# Patient Record
Sex: MALE | URBAN - METROPOLITAN AREA
[De-identification: names, ages, dates, MRNs, and addresses within clinical notes are randomized per-mention and may not be internally consistent; named-entity substitution may affect disease eponyms.]

---

## 2022-06-04 ENCOUNTER — TELEPHONE ENCOUNTER (OUTPATIENT)
Dept: URBAN - METROPOLITAN AREA CLINIC 68 | Facility: CLINIC | Age: 87
End: 2022-06-04

## 2022-06-05 ENCOUNTER — TELEPHONE ENCOUNTER (OUTPATIENT)
Dept: URBAN - METROPOLITAN AREA CLINIC 68 | Facility: CLINIC | Age: 87
End: 2022-06-05

## 2022-06-25 ENCOUNTER — TELEPHONE ENCOUNTER (OUTPATIENT)
Age: 87
End: 2022-06-25

## 2022-06-26 ENCOUNTER — TELEPHONE ENCOUNTER (OUTPATIENT)
Age: 87
End: 2022-06-26

## 2023-01-24 ENCOUNTER — NEW PATIENT (OUTPATIENT)
Dept: URBAN - METROPOLITAN AREA CLINIC 33 | Facility: CLINIC | Age: 88
End: 2023-01-24

## 2023-01-24 VITALS
HEIGHT: 68 IN | SYSTOLIC BLOOD PRESSURE: 134 MMHG | HEART RATE: 71 BPM | BODY MASS INDEX: 21.22 KG/M2 | DIASTOLIC BLOOD PRESSURE: 84 MMHG | WEIGHT: 140 LBS

## 2023-01-24 DIAGNOSIS — H43.393: ICD-10-CM

## 2023-01-24 DIAGNOSIS — H04.123: ICD-10-CM

## 2023-01-24 DIAGNOSIS — H35.3122: ICD-10-CM

## 2023-01-24 DIAGNOSIS — H35.3211: ICD-10-CM

## 2023-01-24 DIAGNOSIS — D31.31: ICD-10-CM

## 2023-01-24 PROCEDURE — 92134 CPTRZ OPH DX IMG PST SGM RTA: CPT

## 2023-01-24 PROCEDURE — 92004 COMPRE OPH EXAM NEW PT 1/>: CPT

## 2023-01-24 ASSESSMENT — TONOMETRY
OS_IOP_MMHG: 16
OD_IOP_MMHG: 16

## 2023-01-24 ASSESSMENT — VISUAL ACUITY
OS_CC: 20/20-2
OD_CC: 20/30-1

## 2023-01-31 ENCOUNTER — CLINIC PROCEDURE ONLY (OUTPATIENT)
Dept: URBAN - METROPOLITAN AREA CLINIC 33 | Facility: CLINIC | Age: 88
End: 2023-01-31

## 2023-01-31 DIAGNOSIS — H35.3122: ICD-10-CM

## 2023-01-31 DIAGNOSIS — H35.3211: ICD-10-CM

## 2023-01-31 PROCEDURE — 92250 FUNDUS PHOTOGRAPHY W/I&R: CPT

## 2023-01-31 PROCEDURE — 67028 INJECTION EYE DRUG: CPT

## 2023-10-02 ENCOUNTER — HOSPITAL ENCOUNTER (OUTPATIENT)
Dept: RADIOLOGY | Facility: HOSPITAL | Age: 88
Discharge: HOME | End: 2023-10-02
Payer: MEDICARE

## 2023-10-02 DIAGNOSIS — I35.0 NONRHEUMATIC AORTIC (VALVE) STENOSIS: ICD-10-CM

## 2023-10-02 PROCEDURE — 74174 CTA ABD&PLVS W/CONTRAST: CPT | Performed by: STUDENT IN AN ORGANIZED HEALTH CARE EDUCATION/TRAINING PROGRAM

## 2023-10-02 PROCEDURE — 74174 CTA ABD&PLVS W/CONTRAST: CPT

## 2023-10-02 PROCEDURE — 2550000001 HC RX 255 CONTRASTS: Performed by: INTERNAL MEDICINE

## 2023-10-02 PROCEDURE — 71275 CT ANGIOGRAPHY CHEST: CPT | Performed by: STUDENT IN AN ORGANIZED HEALTH CARE EDUCATION/TRAINING PROGRAM

## 2023-10-02 RX ADMIN — IOHEXOL 80 ML: 350 INJECTION, SOLUTION INTRAVENOUS at 13:52

## 2023-10-09 ENCOUNTER — CARDIOLOGY CONFERENCE (OUTPATIENT)
Dept: CARDIOLOGY | Facility: HOSPITAL | Age: 88
End: 2023-10-09
Payer: MEDICARE

## 2023-10-09 NOTE — PROGRESS NOTES
A 514, P 84.7  SOV 41/40/40 STJ 36  34 Evolut fx , no predil  RCA 21 , LCA 16.5   22 Fr sheath, Left Femoral Primary

## 2023-10-20 DIAGNOSIS — I35.0 NONRHEUMATIC AORTIC VALVE STENOSIS: ICD-10-CM

## 2023-10-20 NOTE — TELEPHONE ENCOUNTER
Pt called regarding shellfish allergy. Per pt, He was not premedicated for previous testing aa outside facilty. Perpt no issues with contrast dye.

## 2023-10-24 DIAGNOSIS — I35.0 NONRHEUMATIC AORTIC VALVE STENOSIS: Primary | ICD-10-CM

## 2023-10-27 ENCOUNTER — PREP FOR PROCEDURE (OUTPATIENT)
Dept: CARDIOLOGY | Facility: HOSPITAL | Age: 88
End: 2023-10-27
Payer: MEDICARE

## 2023-10-27 DIAGNOSIS — Z95.2 S/P TAVR (TRANSCATHETER AORTIC VALVE REPLACEMENT): ICD-10-CM

## 2023-10-27 DIAGNOSIS — I35.0 SEVERE AORTIC STENOSIS: Primary | ICD-10-CM

## 2023-10-27 RX ORDER — DOCUSATE SODIUM 100 MG/1
100 CAPSULE, LIQUID FILLED ORAL 2 TIMES DAILY
OUTPATIENT
Start: 2023-10-27

## 2023-10-27 RX ORDER — TRAMADOL HYDROCHLORIDE 50 MG/1
50 TABLET ORAL EVERY 6 HOURS PRN
OUTPATIENT
Start: 2023-10-27

## 2023-10-27 RX ORDER — PANTOPRAZOLE SODIUM 40 MG/1
40 TABLET, DELAYED RELEASE ORAL
OUTPATIENT
Start: 2023-10-28

## 2023-10-27 RX ORDER — PROCHLORPERAZINE MALEATE 10 MG
10 TABLET ORAL EVERY 6 HOURS PRN
OUTPATIENT
Start: 2023-10-27

## 2023-10-27 RX ORDER — ACETAMINOPHEN 325 MG/1
650 TABLET ORAL EVERY 6 HOURS PRN
OUTPATIENT
Start: 2023-10-27

## 2023-10-27 RX ORDER — CEFAZOLIN SODIUM 2 G/50ML
2 SOLUTION INTRAVENOUS ONCE
Status: CANCELLED | OUTPATIENT
Start: 2023-10-27 | End: 2023-10-27

## 2023-10-27 RX ORDER — SODIUM CHLORIDE, SODIUM LACTATE, POTASSIUM CHLORIDE, CALCIUM CHLORIDE 600; 310; 30; 20 MG/100ML; MG/100ML; MG/100ML; MG/100ML
75 INJECTION, SOLUTION INTRAVENOUS CONTINUOUS
Status: CANCELLED | OUTPATIENT
Start: 2023-10-27 | End: 2023-10-27

## 2023-10-27 RX ORDER — PANTOPRAZOLE SODIUM 40 MG/10ML
40 INJECTION, POWDER, LYOPHILIZED, FOR SOLUTION INTRAVENOUS
OUTPATIENT
Start: 2023-10-28

## 2023-10-27 RX ORDER — PROCHLORPERAZINE 25 MG/1
25 SUPPOSITORY RECTAL EVERY 12 HOURS PRN
OUTPATIENT
Start: 2023-10-27

## 2023-10-27 RX ORDER — ACETAMINOPHEN 650 MG/1
650 SUPPOSITORY RECTAL EVERY 6 HOURS PRN
OUTPATIENT
Start: 2023-10-27

## 2023-10-27 RX ORDER — ACETAMINOPHEN 160 MG/5ML
650 SOLUTION ORAL EVERY 6 HOURS PRN
OUTPATIENT
Start: 2023-10-27

## 2023-10-27 RX ORDER — PROCHLORPERAZINE EDISYLATE 5 MG/ML
10 INJECTION INTRAMUSCULAR; INTRAVENOUS EVERY 6 HOURS PRN
OUTPATIENT
Start: 2023-10-27

## 2023-10-27 NOTE — DISCHARGE INSTRUCTIONS
####  POST VALVE PROCEDURE DISCHARGE INSTRUCTIONS   ####    - Please weigh yourself daily (first thing in the morning) and record reading  - Please take and record your blood pressure 2 times a day (at least 60-90 mins AFTER you take your meds)  PLEASE HAVE THESE READINGS AVAILABLE DURING YOUR FOLLOW-UP APPOINTMENTS!!    - NO DRIVING OR LIFTING/PULLING/PUSHING GREATER THAN 10 POUNDS FOR 1 WEEK FROM YOUR PROCEDURE DATE.    - You have been given the order requisition for the 1 month ECHO at the time of your discharge.  Please call and schedule this ECHO at your LOCAL center (no sooner than 23 days after your procedure date).    - You will have 2 appointments that are vital to your post procedure care, these will be scheduled for you IF YOU NEED TO CHANGE YOUR APPOINTMENT TIME/DATE, PLEASE CALL [phone]     - Please follow up with your PCP within 7-14 days of discharge  - You will follow up with your primary cardiologist in 6-10 weeks    **** No elective dental procedures or cleanings for 3 months post procedure - You will need dental prophylaxis (oral antibiotic) prior to dental work/cleanings for life *****      ****   CALL PROVIDER IF:   ****  - Breathing faster than normal.   - Breathing harder than normal or having retractions.   - Fever of 100.4 F (38 C) or higher.   - Chills.   - Drinking less than normal.   - Urinating less than normal, over 1 day.   - Acting very sleepy and difficult to awaken.   - Vomiting (throwing up) and not able to eat or drink for 12 hours.   - 3 or more loose, watery bowel movements in 24 hours (diarrhea).    -Any new concerning symptoms.    - If you develop difficulty breathing, rash, hives, severe nausea, vomiting, light-headedness or any signs of infection, immediately contact your doctor and go to the nearest emergency room.      ACTIVITY:  - You are advised to go directly home from the hospital.   - DO NOT lift anything heavier than 10 pounds for one week from procedure, this  allows for proper healing of the groin.   - No excessive exercise or treadmill use for one week. You may walk and do stairs, slowly.   - No sexual activities for 24 hours after you arrive home.      WOUND CARE:  - If slight bleeding should occur at site, lie down and have someone apply firm pressure just above the puncture site for 5 minutes.  If it continues or is profuse, call 911. Always notify your doctor if bleeding occurs.   - Keep site clean and dry. Let air dry or you may use a simple bandaid.   - Gently cleanse the puncture site in your groin with soap and water only.   - You may experience some tenderness, bruising or minimal inflammation.  If you have any concerns, you may contact the Cath Lab or if any of these symptoms become excessive, contact your cardiologist or go to the emergency room.   - No tub baths, soaking, or swimming for one week from procedure.   - May shower the next day after your procedure.    DIET:  - You may resume your normal diet. However, be mindful of your sodium intake.  Ideally you should try to limit your daily intake of sodium to 2-3g a day      HEART FAILURE SPECIFIC INSTRUCTIONS:   - CALL 911 IF YOU HAVE ANY OF THE SIGNS AND SYMPTOMS OF HEART FAILURE:   1. Chest pain   2. Significant Shortness of breath   3. Fainting.   - Notify your physician immediately if you have shortness of breath; weight gain of 3 lbs. or more; fatigue and loss of energy; swelling of lower extremities or abdomen; dizziness or fainting; change of appetite; and frequent coughing.   - Daily weight on the same scale, same time after voiding and before eating.   - Maintain daily weight log.

## 2023-10-30 ENCOUNTER — APPOINTMENT (OUTPATIENT)
Dept: CARDIOLOGY | Facility: HOSPITAL | Age: 88
DRG: 266 | End: 2023-10-30
Payer: MEDICARE

## 2023-10-30 ENCOUNTER — HOSPITAL ENCOUNTER (INPATIENT)
Facility: HOSPITAL | Age: 88
LOS: 10 days | Discharge: NURSING, REHAB OR LONG-TERM CARE | DRG: 266 | End: 2023-11-09
Attending: INTERNAL MEDICINE | Admitting: INTERNAL MEDICINE
Payer: MEDICARE

## 2023-10-30 DIAGNOSIS — I35.9 NONRHEUMATIC AORTIC VALVE DISORDER, UNSPECIFIED: ICD-10-CM

## 2023-10-30 DIAGNOSIS — I10 PRIMARY HYPERTENSION: Chronic | ICD-10-CM

## 2023-10-30 DIAGNOSIS — R33.8 ACUTE URINARY RETENTION: ICD-10-CM

## 2023-10-30 DIAGNOSIS — I48.20 CHRONIC ATRIAL FIBRILLATION (MULTI): Chronic | ICD-10-CM

## 2023-10-30 DIAGNOSIS — I35.0 NONRHEUMATIC AORTIC VALVE STENOSIS: ICD-10-CM

## 2023-10-30 DIAGNOSIS — Z95.3 S/P TAVR (TRANSCATHETER AORTIC VALVE REPLACEMENT), BIOPROSTHETIC: ICD-10-CM

## 2023-10-30 DIAGNOSIS — Z95.2 S/P TAVR (TRANSCATHETER AORTIC VALVE REPLACEMENT): ICD-10-CM

## 2023-10-30 DIAGNOSIS — M25.561 ARTHRALGIA OF BOTH KNEES: Chronic | ICD-10-CM

## 2023-10-30 DIAGNOSIS — I63.20 CEREBROVASCULAR ACCIDENT (CVA) DUE TO OCCLUSION OF PRECEREBRAL ARTERY (MULTI): ICD-10-CM

## 2023-10-30 DIAGNOSIS — M25.562 ARTHRALGIA OF BOTH KNEES: Chronic | ICD-10-CM

## 2023-10-30 DIAGNOSIS — K59.00 CONSTIPATION, UNSPECIFIED CONSTIPATION TYPE: ICD-10-CM

## 2023-10-30 DIAGNOSIS — I35.0 SEVERE AORTIC STENOSIS: Primary | ICD-10-CM

## 2023-10-30 LAB
ALBUMIN SERPL BCP-MCNC: 3.4 G/DL (ref 3.4–5)
ALP SERPL-CCNC: 80 U/L (ref 33–136)
ALT SERPL W P-5'-P-CCNC: 17 U/L (ref 10–52)
ANION GAP SERPL CALC-SCNC: 11 MMOL/L (ref 10–20)
ANION GAP SERPL CALC-SCNC: 15 MMOL/L (ref 10–20)
APTT PPP: 34 SECONDS (ref 27–38)
AST SERPL W P-5'-P-CCNC: 22 U/L (ref 9–39)
BILIRUB SERPL-MCNC: 0.6 MG/DL (ref 0–1.2)
BUN SERPL-MCNC: 19 MG/DL (ref 6–23)
BUN SERPL-MCNC: 19 MG/DL (ref 6–23)
CALCIUM SERPL-MCNC: 8.7 MG/DL (ref 8.6–10.6)
CALCIUM SERPL-MCNC: 9.8 MG/DL (ref 8.6–10.6)
CHLORIDE SERPL-SCNC: 104 MMOL/L (ref 98–107)
CHLORIDE SERPL-SCNC: 104 MMOL/L (ref 98–107)
CO2 SERPL-SCNC: 28 MMOL/L (ref 21–32)
CO2 SERPL-SCNC: 30 MMOL/L (ref 21–32)
CREAT SERPL-MCNC: 0.98 MG/DL (ref 0.5–1.3)
CREAT SERPL-MCNC: 0.99 MG/DL (ref 0.5–1.3)
EJECTION FRACTION APICAL 4 CHAMBER: 44.5
ERYTHROCYTE [DISTWIDTH] IN BLOOD BY AUTOMATED COUNT: 13.3 % (ref 11.5–14.5)
ERYTHROCYTE [DISTWIDTH] IN BLOOD BY AUTOMATED COUNT: 13.5 % (ref 11.5–14.5)
GFR SERPL CREATININE-BSD FRML MDRD: 74 ML/MIN/1.73M*2
GFR SERPL CREATININE-BSD FRML MDRD: 75 ML/MIN/1.73M*2
GLUCOSE BLD MANUAL STRIP-MCNC: 80 MG/DL (ref 74–99)
GLUCOSE SERPL-MCNC: 105 MG/DL (ref 74–99)
GLUCOSE SERPL-MCNC: 95 MG/DL (ref 74–99)
HCT VFR BLD AUTO: 35 % (ref 41–52)
HCT VFR BLD AUTO: 39.1 % (ref 41–52)
HGB BLD-MCNC: 11.2 G/DL (ref 13.5–17.5)
HGB BLD-MCNC: 11.9 G/DL (ref 13.5–17.5)
INR PPP: 1.2 (ref 0.9–1.1)
MAGNESIUM SERPL-MCNC: 2.12 MG/DL (ref 1.6–2.4)
MCH RBC QN AUTO: 30.7 PG (ref 26–34)
MCH RBC QN AUTO: 31.2 PG (ref 26–34)
MCHC RBC AUTO-ENTMCNC: 30.4 G/DL (ref 32–36)
MCHC RBC AUTO-ENTMCNC: 32 G/DL (ref 32–36)
MCV RBC AUTO: 101 FL (ref 80–100)
MCV RBC AUTO: 98 FL (ref 80–100)
NRBC BLD-RTO: 0 /100 WBCS (ref 0–0)
NRBC BLD-RTO: 0 /100 WBCS (ref 0–0)
PHOSPHATE SERPL-MCNC: 3.8 MG/DL (ref 2.5–4.9)
PLATELET # BLD AUTO: 144 X10*3/UL (ref 150–450)
PLATELET # BLD AUTO: 255 X10*3/UL (ref 150–450)
PMV BLD AUTO: 10.2 FL (ref 7.5–11.5)
PMV BLD AUTO: 9.9 FL (ref 7.5–11.5)
POTASSIUM SERPL-SCNC: 4.7 MMOL/L (ref 3.5–5.3)
POTASSIUM SERPL-SCNC: 4.8 MMOL/L (ref 3.5–5.3)
PROT SERPL-MCNC: 5.7 G/DL (ref 6.4–8.2)
PROTHROMBIN TIME: 13.7 SECONDS (ref 9.8–12.8)
RBC # BLD AUTO: 3.59 X10*6/UL (ref 4.5–5.9)
RBC # BLD AUTO: 3.88 X10*6/UL (ref 4.5–5.9)
SODIUM SERPL-SCNC: 140 MMOL/L (ref 136–145)
SODIUM SERPL-SCNC: 142 MMOL/L (ref 136–145)
WBC # BLD AUTO: 7.1 X10*3/UL (ref 4.4–11.3)
WBC # BLD AUTO: 7.4 X10*3/UL (ref 4.4–11.3)

## 2023-10-30 PROCEDURE — 2020000001 HC ICU ROOM DAILY

## 2023-10-30 PROCEDURE — 2500000001 HC RX 250 WO HCPCS SELF ADMINISTERED DRUGS (ALT 637 FOR MEDICARE OP): Performed by: NURSE PRACTITIONER

## 2023-10-30 PROCEDURE — 82947 ASSAY GLUCOSE BLOOD QUANT: CPT

## 2023-10-30 PROCEDURE — 85027 COMPLETE CBC AUTOMATED: CPT

## 2023-10-30 PROCEDURE — 85027 COMPLETE CBC AUTOMATED: CPT | Performed by: NURSE PRACTITIONER

## 2023-10-30 PROCEDURE — 33361 REPLACE AORTIC VALVE PERQ: CPT | Performed by: THORACIC SURGERY (CARDIOTHORACIC VASCULAR SURGERY)

## 2023-10-30 PROCEDURE — 84100 ASSAY OF PHOSPHORUS: CPT

## 2023-10-30 PROCEDURE — 93286 PERI-PX EVAL PM/LDLS PM IP: CPT | Performed by: STUDENT IN AN ORGANIZED HEALTH CARE EDUCATION/TRAINING PROGRAM

## 2023-10-30 PROCEDURE — 36415 COLL VENOUS BLD VENIPUNCTURE: CPT | Performed by: NURSE PRACTITIONER

## 2023-10-30 PROCEDURE — 80048 BASIC METABOLIC PNL TOTAL CA: CPT | Performed by: NURSE PRACTITIONER

## 2023-10-30 PROCEDURE — 2720000007 HC OR 272 NO HCPCS: Performed by: INTERNAL MEDICINE

## 2023-10-30 PROCEDURE — 2500000004 HC RX 250 GENERAL PHARMACY W/ HCPCS (ALT 636 FOR OP/ED): Performed by: NURSE PRACTITIONER

## 2023-10-30 PROCEDURE — C1760 CLOSURE DEV, VASC: HCPCS | Performed by: INTERNAL MEDICINE

## 2023-10-30 PROCEDURE — 80048 BASIC METABOLIC PNL TOTAL CA: CPT | Mod: CCI

## 2023-10-30 PROCEDURE — 33361 REPLACE AORTIC VALVE PERQ: CPT | Performed by: INTERNAL MEDICINE

## 2023-10-30 PROCEDURE — 93321 DOPPLER ECHO F-UP/LMTD STD: CPT

## 2023-10-30 PROCEDURE — 93308 TTE F-UP OR LMTD: CPT | Performed by: STUDENT IN AN ORGANIZED HEALTH CARE EDUCATION/TRAINING PROGRAM

## 2023-10-30 PROCEDURE — 99291 CRITICAL CARE FIRST HOUR: CPT

## 2023-10-30 PROCEDURE — 99152 MOD SED SAME PHYS/QHP 5/>YRS: CPT | Performed by: INTERNAL MEDICINE

## 2023-10-30 PROCEDURE — 99223 1ST HOSP IP/OBS HIGH 75: CPT | Performed by: STUDENT IN AN ORGANIZED HEALTH CARE EDUCATION/TRAINING PROGRAM

## 2023-10-30 PROCEDURE — 85347 COAGULATION TIME ACTIVATED: CPT

## 2023-10-30 PROCEDURE — 93286 PERI-PX EVAL PM/LDLS PM IP: CPT

## 2023-10-30 PROCEDURE — 2780000003 HC OR 278 NO HCPCS: Performed by: INTERNAL MEDICINE

## 2023-10-30 PROCEDURE — 93325 DOPPLER ECHO COLOR FLOW MAPG: CPT | Performed by: STUDENT IN AN ORGANIZED HEALTH CARE EDUCATION/TRAINING PROGRAM

## 2023-10-30 PROCEDURE — 85347 COAGULATION TIME ACTIVATED: CPT | Performed by: INTERNAL MEDICINE

## 2023-10-30 PROCEDURE — C1769 GUIDE WIRE: HCPCS | Performed by: INTERNAL MEDICINE

## 2023-10-30 PROCEDURE — 80053 COMPREHEN METABOLIC PANEL: CPT | Performed by: NURSE PRACTITIONER

## 2023-10-30 PROCEDURE — 85610 PROTHROMBIN TIME: CPT

## 2023-10-30 PROCEDURE — 2500000005 HC RX 250 GENERAL PHARMACY W/O HCPCS: Performed by: INTERNAL MEDICINE

## 2023-10-30 PROCEDURE — C1894 INTRO/SHEATH, NON-LASER: HCPCS | Performed by: INTERNAL MEDICINE

## 2023-10-30 PROCEDURE — C1889 IMPLANT/INSERT DEVICE, NOC: HCPCS | Performed by: INTERNAL MEDICINE

## 2023-10-30 PROCEDURE — 02RF38Z REPLACEMENT OF AORTIC VALVE WITH ZOOPLASTIC TISSUE, PERCUTANEOUS APPROACH: ICD-10-PCS | Performed by: INTERNAL MEDICINE

## 2023-10-30 PROCEDURE — 83735 ASSAY OF MAGNESIUM: CPT

## 2023-10-30 PROCEDURE — 2500000004 HC RX 250 GENERAL PHARMACY W/ HCPCS (ALT 636 FOR OP/ED): Performed by: INTERNAL MEDICINE

## 2023-10-30 PROCEDURE — 93321 DOPPLER ECHO F-UP/LMTD STD: CPT | Performed by: STUDENT IN AN ORGANIZED HEALTH CARE EDUCATION/TRAINING PROGRAM

## 2023-10-30 PROCEDURE — 2500000001 HC RX 250 WO HCPCS SELF ADMINISTERED DRUGS (ALT 637 FOR MEDICARE OP)

## 2023-10-30 PROCEDURE — 36415 COLL VENOUS BLD VENIPUNCTURE: CPT

## 2023-10-30 PROCEDURE — 99153 MOD SED SAME PHYS/QHP EA: CPT | Performed by: INTERNAL MEDICINE

## 2023-10-30 DEVICE — IMPLANTABLE DEVICE: Type: IMPLANTABLE DEVICE | Status: FUNCTIONAL

## 2023-10-30 DEVICE — LOADING SYS L-EVOLUTFX-34
Type: IMPLANTABLE DEVICE | Status: NON-FUNCTIONAL
Brand: EVOLUT™ FX

## 2023-10-30 DEVICE — DELIV SYS D-EVOLUTFX-34
Type: IMPLANTABLE DEVICE | Status: NON-FUNCTIONAL
Brand: EVOLUT™ FX

## 2023-10-30 RX ORDER — LISINOPRIL 2.5 MG/1
2.5 TABLET ORAL EVERY EVENING
COMMUNITY
End: 2023-11-09 | Stop reason: HOSPADM

## 2023-10-30 RX ORDER — DIGOXIN 125 MCG
62.5 TABLET ORAL DAILY
Status: DISCONTINUED | OUTPATIENT
Start: 2023-10-31 | End: 2023-11-07

## 2023-10-30 RX ORDER — HEPARIN SODIUM 1000 [USP'U]/ML
INJECTION, SOLUTION INTRAVENOUS; SUBCUTANEOUS AS NEEDED
Status: DISCONTINUED | OUTPATIENT
Start: 2023-10-30 | End: 2023-10-30 | Stop reason: HOSPADM

## 2023-10-30 RX ORDER — FERROUS SULFATE 325(65) MG
1 TABLET ORAL
Status: DISCONTINUED | OUTPATIENT
Start: 2023-10-31 | End: 2023-10-30

## 2023-10-30 RX ORDER — FENTANYL CITRATE 50 UG/ML
INJECTION, SOLUTION INTRAMUSCULAR; INTRAVENOUS AS NEEDED
Status: DISCONTINUED | OUTPATIENT
Start: 2023-10-30 | End: 2023-10-30 | Stop reason: HOSPADM

## 2023-10-30 RX ORDER — CEFAZOLIN SODIUM 2 G/50ML
2 SOLUTION INTRAVENOUS ONCE
Status: COMPLETED | OUTPATIENT
Start: 2023-10-30 | End: 2023-10-30

## 2023-10-30 RX ORDER — CLOPIDOGREL BISULFATE 75 MG/1
75 TABLET ORAL DAILY
COMMUNITY

## 2023-10-30 RX ORDER — WARFARIN 2 MG/1
2 TABLET ORAL SEE ADMIN INSTRUCTIONS
Status: ON HOLD | COMMUNITY
End: 2023-11-09 | Stop reason: SDUPTHER

## 2023-10-30 RX ORDER — EZETIMIBE 10 MG/1
10 TABLET ORAL DAILY
COMMUNITY

## 2023-10-30 RX ORDER — FARICIMAB 6 MG/.05ML
6 INJECTION, SOLUTION INTRAVITREAL
COMMUNITY

## 2023-10-30 RX ORDER — FERROUS SULFATE 325(65) MG
1 TABLET ORAL
Status: DISCONTINUED | OUTPATIENT
Start: 2023-10-31 | End: 2023-11-09 | Stop reason: HOSPADM

## 2023-10-30 RX ORDER — FAMOTIDINE 20 MG/1
10 TABLET, FILM COATED ORAL 2 TIMES DAILY PRN
COMMUNITY
Start: 2023-07-19

## 2023-10-30 RX ORDER — CLOPIDOGREL BISULFATE 75 MG/1
75 TABLET ORAL DAILY
Status: DISCONTINUED | OUTPATIENT
Start: 2023-10-30 | End: 2023-11-09 | Stop reason: HOSPADM

## 2023-10-30 RX ORDER — SOTALOL HYDROCHLORIDE 80 MG/1
80 TABLET ORAL 2 TIMES DAILY
Status: DISCONTINUED | OUTPATIENT
Start: 2023-10-30 | End: 2023-11-09 | Stop reason: HOSPADM

## 2023-10-30 RX ORDER — SOTALOL HYDROCHLORIDE 80 MG/1
80 TABLET ORAL 2 TIMES DAILY
Status: DISCONTINUED | OUTPATIENT
Start: 2023-10-30 | End: 2023-10-30

## 2023-10-30 RX ORDER — NAPROXEN SODIUM 220 MG/1
81 TABLET, FILM COATED ORAL DAILY
Status: DISCONTINUED | OUTPATIENT
Start: 2023-10-30 | End: 2023-10-30

## 2023-10-30 RX ORDER — EZETIMIBE 10 MG/1
10 TABLET ORAL DAILY
Status: DISCONTINUED | OUTPATIENT
Start: 2023-10-31 | End: 2023-11-09 | Stop reason: HOSPADM

## 2023-10-30 RX ORDER — LORATADINE 10 MG/1
5 TABLET ORAL DAILY PRN
Status: DISCONTINUED | OUTPATIENT
Start: 2023-10-30 | End: 2023-10-30

## 2023-10-30 RX ORDER — SOTALOL HYDROCHLORIDE 80 MG/1
80 TABLET ORAL 2 TIMES DAILY
COMMUNITY

## 2023-10-30 RX ORDER — DIGOXIN 125 MCG
62.5 TABLET ORAL DAILY
Status: DISCONTINUED | OUTPATIENT
Start: 2023-10-31 | End: 2023-10-30

## 2023-10-30 RX ORDER — ASPIRIN 81 MG/1
81 TABLET ORAL DAILY
Status: DISCONTINUED | OUTPATIENT
Start: 2023-10-30 | End: 2023-11-02

## 2023-10-30 RX ORDER — LIDOCAINE HYDROCHLORIDE 10 MG/ML
INJECTION, SOLUTION EPIDURAL; INFILTRATION; INTRACAUDAL; PERINEURAL AS NEEDED
Status: DISCONTINUED | OUTPATIENT
Start: 2023-10-30 | End: 2023-10-30 | Stop reason: HOSPADM

## 2023-10-30 RX ORDER — ACETAMINOPHEN 500 MG
5 TABLET ORAL NIGHTLY PRN
Status: DISCONTINUED | OUTPATIENT
Start: 2023-10-30 | End: 2023-10-30

## 2023-10-30 RX ORDER — CLOPIDOGREL BISULFATE 75 MG/1
75 TABLET ORAL DAILY
Status: DISCONTINUED | OUTPATIENT
Start: 2023-10-31 | End: 2023-10-30

## 2023-10-30 RX ORDER — ACETAMINOPHEN 500 MG
5 TABLET ORAL NIGHTLY PRN
COMMUNITY

## 2023-10-30 RX ORDER — SODIUM CHLORIDE 9 MG/ML
INJECTION, SOLUTION INTRAVENOUS CONTINUOUS PRN
Status: COMPLETED | OUTPATIENT
Start: 2023-10-30 | End: 2023-10-30

## 2023-10-30 RX ORDER — GLUCOSAMINE/CHONDR SU A SOD 750-600 MG
3000 TABLET ORAL DAILY
COMMUNITY
Start: 2021-08-16 | End: 2023-11-09 | Stop reason: HOSPADM

## 2023-10-30 RX ORDER — ACETAMINOPHEN 160 MG/5ML
200 SUSPENSION, ORAL (FINAL DOSE FORM) ORAL DAILY
COMMUNITY
Start: 2021-08-16

## 2023-10-30 RX ORDER — PROTAMINE SULFATE 10 MG/ML
INJECTION, SOLUTION INTRAVENOUS CONTINUOUS PRN
Status: COMPLETED | OUTPATIENT
Start: 2023-10-30 | End: 2023-10-30

## 2023-10-30 RX ORDER — HEPARIN SODIUM 5000 [USP'U]/ML
5000 INJECTION, SOLUTION INTRAVENOUS; SUBCUTANEOUS EVERY 8 HOURS
Status: DISCONTINUED | OUTPATIENT
Start: 2023-10-30 | End: 2023-11-09 | Stop reason: HOSPADM

## 2023-10-30 RX ORDER — ACETAMINOPHEN 500 MG
1000 TABLET ORAL DAILY
COMMUNITY
Start: 2023-05-02

## 2023-10-30 RX ORDER — FERROUS SULFATE 325(65) MG
1 TABLET ORAL
COMMUNITY
Start: 2022-05-03

## 2023-10-30 RX ORDER — LORATADINE 10 MG/1
5 TABLET ORAL DAILY PRN
COMMUNITY
Start: 2023-07-21

## 2023-10-30 RX ORDER — NAPROXEN SODIUM 220 MG/1
81 TABLET, FILM COATED ORAL DAILY
COMMUNITY
Start: 2023-08-08 | End: 2023-11-09 | Stop reason: HOSPADM

## 2023-10-30 RX ORDER — SODIUM CHLORIDE, SODIUM LACTATE, POTASSIUM CHLORIDE, CALCIUM CHLORIDE 600; 310; 30; 20 MG/100ML; MG/100ML; MG/100ML; MG/100ML
75 INJECTION, SOLUTION INTRAVENOUS CONTINUOUS
Status: ACTIVE | OUTPATIENT
Start: 2023-10-30 | End: 2023-10-30

## 2023-10-30 RX ORDER — MIDAZOLAM HYDROCHLORIDE 1 MG/ML
INJECTION INTRAMUSCULAR; INTRAVENOUS AS NEEDED
Status: DISCONTINUED | OUTPATIENT
Start: 2023-10-30 | End: 2023-10-30 | Stop reason: HOSPADM

## 2023-10-30 RX ORDER — DIGOXIN 125 MCG
62.5 TABLET ORAL DAILY
COMMUNITY
End: 2023-11-09 | Stop reason: HOSPADM

## 2023-10-30 RX ORDER — ACETAMINOPHEN 500 MG
5 TABLET ORAL NIGHTLY PRN
Status: DISCONTINUED | OUTPATIENT
Start: 2023-10-30 | End: 2023-11-09 | Stop reason: HOSPADM

## 2023-10-30 RX ADMIN — SOTALOL HYDROCHLORIDE 80 MG: 80 TABLET ORAL at 21:05

## 2023-10-30 RX ADMIN — SODIUM CHLORIDE, POTASSIUM CHLORIDE, SODIUM LACTATE AND CALCIUM CHLORIDE 75 ML/HR: 600; 310; 30; 20 INJECTION, SOLUTION INTRAVENOUS at 09:00

## 2023-10-30 RX ADMIN — CLOPIDOGREL BISULFATE 75 MG: 75 TABLET ORAL at 20:30

## 2023-10-30 RX ADMIN — ASPIRIN 81 MG: 81 TABLET, COATED ORAL at 20:29

## 2023-10-30 SDOH — SOCIAL STABILITY: SOCIAL INSECURITY: DO YOU FEEL ANYONE HAS EXPLOITED OR TAKEN ADVANTAGE OF YOU FINANCIALLY OR OF YOUR PERSONAL PROPERTY?: NO

## 2023-10-30 SDOH — SOCIAL STABILITY: SOCIAL INSECURITY: ARE YOU OR HAVE YOU BEEN THREATENED OR ABUSED PHYSICALLY, EMOTIONALLY, OR SEXUALLY BY ANYONE?: NO

## 2023-10-30 SDOH — SOCIAL STABILITY: SOCIAL INSECURITY: WERE YOU ABLE TO COMPLETE ALL THE BEHAVIORAL HEALTH SCREENINGS?: YES

## 2023-10-30 SDOH — SOCIAL STABILITY: SOCIAL INSECURITY: ARE THERE ANY APPARENT SIGNS OF INJURIES/BEHAVIORS THAT COULD BE RELATED TO ABUSE/NEGLECT?: NO

## 2023-10-30 SDOH — SOCIAL STABILITY: SOCIAL INSECURITY: HAS ANYONE EVER THREATENED TO HURT YOUR FAMILY OR YOUR PETS?: NO

## 2023-10-30 SDOH — SOCIAL STABILITY: SOCIAL INSECURITY: HAVE YOU HAD THOUGHTS OF HARMING ANYONE ELSE?: NO

## 2023-10-30 SDOH — SOCIAL STABILITY: SOCIAL INSECURITY: ABUSE: ADULT

## 2023-10-30 SDOH — SOCIAL STABILITY: SOCIAL INSECURITY: DO YOU FEEL UNSAFE GOING BACK TO THE PLACE WHERE YOU ARE LIVING?: NO

## 2023-10-30 SDOH — SOCIAL STABILITY: SOCIAL INSECURITY: DOES ANYONE TRY TO KEEP YOU FROM HAVING/CONTACTING OTHER FRIENDS OR DOING THINGS OUTSIDE YOUR HOME?: NO

## 2023-10-30 ASSESSMENT — ACTIVITIES OF DAILY LIVING (ADL)
ADEQUATE_TO_COMPLETE_ADL: YES
FEEDING YOURSELF: INDEPENDENT
DRESSING YOURSELF: INDEPENDENT
HEARING - RIGHT EAR: FUNCTIONAL
JUDGMENT_ADEQUATE_SAFELY_COMPLETE_DAILY_ACTIVITIES: YES
LACK_OF_TRANSPORTATION: NO
TOILETING: INDEPENDENT
WALKS IN HOME: INDEPENDENT
HEARING - LEFT EAR: FUNCTIONAL
GROOMING: INDEPENDENT
PATIENT'S MEMORY ADEQUATE TO SAFELY COMPLETE DAILY ACTIVITIES?: YES
BATHING: INDEPENDENT

## 2023-10-30 ASSESSMENT — LIFESTYLE VARIABLES
PRESCIPTION_ABUSE_PAST_12_MONTHS: NO
HOW MANY STANDARD DRINKS CONTAINING ALCOHOL DO YOU HAVE ON A TYPICAL DAY: 1 OR 2
HOW OFTEN DO YOU HAVE 6 OR MORE DRINKS ON ONE OCCASION: NEVER
SKIP TO QUESTIONS 9-10: 1
AUDIT-C TOTAL SCORE: 2
SUBSTANCE_ABUSE_PAST_12_MONTHS: NO
HOW OFTEN DO YOU HAVE A DRINK CONTAINING ALCOHOL: 2-4 TIMES A MONTH
AUDIT-C TOTAL SCORE: 2

## 2023-10-30 ASSESSMENT — PATIENT HEALTH QUESTIONNAIRE - PHQ9
1. LITTLE INTEREST OR PLEASURE IN DOING THINGS: NOT AT ALL
2. FEELING DOWN, DEPRESSED OR HOPELESS: NOT AT ALL
SUM OF ALL RESPONSES TO PHQ9 QUESTIONS 1 & 2: 0

## 2023-10-30 ASSESSMENT — PAIN - FUNCTIONAL ASSESSMENT
PAIN_FUNCTIONAL_ASSESSMENT: 0-10

## 2023-10-30 ASSESSMENT — COLUMBIA-SUICIDE SEVERITY RATING SCALE - C-SSRS
6. HAVE YOU EVER DONE ANYTHING, STARTED TO DO ANYTHING, OR PREPARED TO DO ANYTHING TO END YOUR LIFE?: NO
2. HAVE YOU ACTUALLY HAD ANY THOUGHTS OF KILLING YOURSELF?: NO
1. IN THE PAST MONTH, HAVE YOU WISHED YOU WERE DEAD OR WISHED YOU COULD GO TO SLEEP AND NOT WAKE UP?: NO

## 2023-10-30 ASSESSMENT — PAIN SCALES - GENERAL
PAINLEVEL_OUTOF10: 0 - NO PAIN

## 2023-10-30 NOTE — PROGRESS NOTES
Pharmacy Medication History Review    Javier Tate is a 88 y.o. male admitted for Nonrheumatic aortic valve stenosis. Pharmacy reviewed the patient's owazo-xf-hgwtfsidt medications and allergies for accuracy.    The list below reflects the updated PTA list. Please review each medication in order reconciliation for additional clarification and justification.    Below are additional concerns with the patient's PTA list.    Patient reports using digoxin once daily, this is also noted in cardiology note on 9/25/23. Last dispensed 9/5/23 with directions 1/2 tablet twice a day.    Sources: [facility], retail dispense history, Cardiology note 9/25/23, [facility] note 8/8/23.    Medications Prior to Admission   Medication Sig Dispense Refill Last Dose    aspirin 81 mg chewable tablet Chew 1 tablet (81 mg) once daily.   10/29/2023    coenzyme Q-10 200 mg capsule Take 1 capsule (200 mg) by mouth once daily.   10/29/2023    famotidine (Pepcid) 20 mg tablet Take 0.5 tablets (10 mg) by mouth 2 times a day as needed for heartburn.   Past Week    ferrous sulfate 325 (65 Fe) MG tablet Take 1 tablet (325 mg) by mouth once daily.   10/29/2023    glucosamine HCl 1,500 mg tablet Take 3,000 mg by mouth once daily.   10/29/2023    loratadine (Claritin) 10 mg tablet Take 0.5 tablets (5 mg) by mouth once daily as needed for allergies.   10/29/2023    multivit-mineral-iron-lutein tablet Take 1 tablet by mouth once daily.   10/29/2023    omega-3 (Fish Oil) 300-1,000 mg capsule Take 1 capsule (1,000 mg) by mouth once daily.   10/29/2023    vit C/E/zinc ox/bisi/lut/zeax (ICAPS AREDS2 ORAL) Take 1 capsule by mouth once daily.   10/29/2023    clopidogrel (Plavix) 75 mg tablet Take 1 tablet (75 mg) by mouth once daily.   10/29/2023    digoxin (Lanoxin) 125 MCG tablet Take 0.5 tablets (62.5 mcg) by mouth once daily.   10/29/2023    ezetimibe (Zetia) 10 mg tablet Take 1 tablet (10 mg) by mouth once daily.   10/30/2023    faricimab-svoa (Vabysmo)  6 mg/0.05 mL solution injection 0.05 mL (6 mg) by intravitreal route every 8 (eight) weeks. Next dose scheduled 11/6/23   More than a month    lisinopril 2.5 mg tablet Take 1 tablet (2.5 mg) by mouth once daily in the evening.   10/29/2023    melatonin 5 mg tablet Take 1 tablet (5 mg) by mouth as needed at bedtime for sleep.   10/29/2023    sotalol (Betapace) 80 mg tablet Take 1 tablet (80 mg) by mouth 2 times a day.   10/30/2023    warfarin (Coumadin) 2 mg tablet Take 1 tablet (2 mg) by mouth see administration instructions. Take 1 and 1/2 tablets (3 mg) once daily on Sunday, Monday, Wednesday, Friday and Saturday. Take 1 tablet (2 mg) once daily on Tuesday and Thursday.   10/24/2023        The list below reflects the updated allergy list. Please review each documented allergy for additional clarification and justification.  Allergies  Reviewed by Gloria Munoz RN on 10/30/2023        Severity Reactions Comments    Adhesive Tape-silicones Not Specified Other Skin irritation    Statins-hmg-coa Reductase Inhibitors Not Specified Other     Shellfish Containing Products Low Nausea And Vomiting             Katrin Dick [facility]  Transitions of Care Pharmacist  Medication reconciliation complete  Please reach out via [employer] secure chat for questions, or if no response call [phone] or [employer]  [facility]

## 2023-10-30 NOTE — Clinical Note
Patient ID band present and verified. Patient contact is in family room. Contact(s) present: spouse.

## 2023-10-30 NOTE — POST-PROCEDURE NOTE
Physician Transition of Care Summary  Invasive Cardiovascular Lab    Procedure Date: 10/30/2023  Attending: Panel 1:     * Joshua Umana - Primary  Panel 2:     * Nolan Stern - Primary  Resident/Fellow/Other Assistant: Surgeon(s) and Role:  Panel 1:     * Elizabet Matthews MD - Fellow    Indications:   Pre-op Diagnosis     * Nonrheumatic aortic valve stenosis [I35.0]    Post-procedure diagnosis:   Post-op Diagnosis     * Nonrheumatic aortic valve stenosis [I35.0]    Procedure(s):   TAVR (Transcatheter AV Replacement)  02825 - MS REPLACE AORTIC VALVE PERQ FEMORAL ARTRY APPROACH    TVP for TAVR  92257 - MS REPLACE AORTIC VALVE PERQ FEMORAL ARTRY APPROACH    TAVR (Transcatheter AV Replacement)  50778 - MS REPLACE AORTIC VALVE PERQ FEMORAL ARTRY APPROACH    MS REPLACE AORTIC VALVE PERQ FEMORAL ARTRY APPROACH [19165]      Description of the Procedure:   Indication: Severe Aortic Stenosis    Valve used: 34 mm Evolut FX    Access  Primary: left CFA s/p 2 proglide --> needed additional 20 min manual hold  Secondary: left CFA 6 [place] s/p 1 proglide    TVP: R CFV --> removed in the lab    Pre LVEDP: 25    Mild PVL  No effusion    The patient had confusion post procedure. No lateralizing symptoms. He was evaluated by neurology and overall picture doesn't suggest a stroke rather acute confusion.    He will be monitored on CVICU --> Q1hr neuro checks    He also was found to have bubbles in the LV during the procedure with drips --> once the drips were stopped those disappeared. He will get bubble study with his TTE.     Continue DAPT due to extensive LM and LAD stenting 7/2023        Complications:   None      Estimated Blood Loss:   20 mL    Anesthesia: Moderate Sedation Anesthesia Staff: No anesthesia staff entered.    Any Specimen(s) Removed:   No specimens collected during this procedure.    Disposition:   Dc plan tomorrow if stable      Electronically signed by: Elizabet Matthews MD, 10/30/2023 1:34 PM

## 2023-10-30 NOTE — OP NOTE
Indications.  The patient  is an 88y o gentleman who was recently diagnosed with severe symptomatic aortic stenosis. Patient underwent complete work up, the results were presented and reviewed at the Structural Heart Selection meeting. Patient was thought to most benefit from CHANTE. Risks, benefits and complications of this procedure were discussed with the patient, and informed consent was obtained.     Findings.  34 mm Evolut FX valve was deployed under rapid pacing. Post deployment TTE showed low tarns valvular gradient, no PVL, and no pericardial effusion. Patient did develop an episode of confusion shortly after valve deployment but continued to be able to move all the extremities. We will monitor the patient in CICU.     Description.  Patient was brought to the hybrid OR and placed on a operating table in a supine position. After routine huddle, conscious sedation was established, patient was prepped and draped in a standard fashion. RIJ vein accessed, and a temporary pacing was advanced into the RV and tested. Both right and left femoral arteries were accessed routinely. Patient was heparinized. Primary left femoral artery access was protected with two Perclose devices. LV  was accessed with a straight wire, and after appropriate wire exchange a 34mm Evolut FX valve was then advanced into the aortic root and deployed routinely under rapid pacing at 120-140/min. TTE findings are described above. Protamine was given, all cannulas were removed,a nd cannulation sited secured. Patient did develop an episode of confusion without lateralizing symptoms, so we will watch the patient in the CICU. Patient tolerated this procedure well, and was taken out of the OR to the CICU in a stable condition.

## 2023-10-30 NOTE — SIGNIFICANT EVENT
RAPID RESPONSE RN NOTE:       10/30/23 1305   Onset Documentation   Rapid Response Initiated By Physician   Location/Room [facility]   Pager Time [phone]   Arrival Time 1310   Event End Time 1325   Primary Reason for Call BAT - see stroke narrator     BAT paged at 13:05 in cath lab.  On my arrival, neurology to bedside for eval.  BAT canceled by MD Mcnair (neuro).  Plan is to transfer to CICU post TAVR.

## 2023-10-30 NOTE — H&P
"History Of Present Illness  Javier Tate is a 88 y.o. male with a PMH of afib (warfarin, sotalol, digoxin), CAD (7/2023 PCI), HTN, and severe aortic stenosis who p/w concern for confusion noticed during a TAVR procedure.     Pt was admitted on 10/30 for a TAVR. There was reports of confusion during the procedure. TAVR was successfully placed but pt continued to display evidence of problems with comprehension in post-op despite discontinuation of sedation so a BAT was called and pt was admitted to the CICU.    Pt reports feeling confused, stating that he is unsure if he feels like this because of the sedation. He denies HA, vision changes, weakness, problems with sensation, pain throughout, SOB, CP. ROS otherwise negative.     Past Social Hx:  - remote tobacco use  - occasional alcohol use  - denies illicit drug use     Past Medical History  No past medical history on file.    Surgical History  No past surgical history on file.     Social History  He has no history on file for tobacco use, alcohol use, and drug use.    Family History  No family history on file.     Allergies  Adhesive tape-silicones, Statins-hmg-coa reductase inhibitors, and Shellfish containing products    Review of Systems   above  Physical Exam  General: lying flat in bed. Sleeping. Non-toxic appearing. A+Ox2 to person and place  Neuro: CNII-XII grossly intact. Finger-nose test with some initial evidence of abnormalities but improved to normal. 5/5 strength throughout.  Neck: No JVD  ENT: Moist Mucous Membranes  CV: RRR. No murmurs. No rubs.  Resp: CTAB  Abd: NT ND  : no masses at either groin  Ext: full ROM. No swelling.  Skin: No rash    Last Recorded Vitals  Blood pressure 98/86, pulse 71, resp. rate 19, height 1.702 m (5' 7\"), weight 56.7 kg (125 lb), SpO2 98 %.    Relevant Results  Scheduled medications  [MAR Hold] clopidogrel, 75 mg, oral, Daily  [MAR Hold] digoxin, 62.5 mcg, oral, Daily  [MAR Hold] ferrous sulfate, 1 tablet, oral, " Daily  perflutren lipid microspheres, 0.5-10 mL of dilution, intravenous, Once in imaging  perflutren lipid microspheres, 0.5-10 mL of dilution, intravenous, Once in imaging  [START ON 10/31/2023] perflutren lipid microspheres, 4 mL of dilution, intravenous, Once in imaging  perflutren protein A microsphere, 0.5 mL, intravenous, Once in imaging  perflutren protein A microsphere, 0.5 mL, intravenous, Once in imaging  [MAR Hold] sotalol, 80 mg, oral, BID  sulfur hexafluoride microsphr, 2 mL, intravenous, Once in imaging  sulfur hexafluoride microsphr, 2 mL, intravenous, Once in imaging      Continuous medications     PRN medications  PRN medications: [MAR Hold] loratadine, [MAR Hold] melatonin    Results for orders placed or performed during the hospital encounter of 10/30/23 (from the past 24 hour(s))   Basic metabolic panel   Result Value Ref Range    Glucose 95 74 - 99 mg/dL    Sodium 140 136 - 145 mmol/L    Potassium 4.8 3.5 - 5.3 mmol/L    Chloride 104 98 - 107 mmol/L    Bicarbonate 30 21 - 32 mmol/L    Anion Gap 11 10 - 20 mmol/L    Urea Nitrogen 19 6 - 23 mg/dL    Creatinine 0.99 0.50 - 1.30 mg/dL    eGFR 74 >60 mL/min/1.73m*2    Calcium 9.8 8.6 - 10.6 mg/dL   CBC   Result Value Ref Range    WBC 7.1 4.4 - 11.3 x10*3/uL    nRBC 0.0 0.0 - 0.0 /100 WBCs    RBC 3.59 (L) 4.50 - 5.90 x10*6/uL    Hemoglobin 11.2 (L) 13.5 - 17.5 g/dL    Hematocrit 35.0 (L) 41.0 - 52.0 %    MCV 98 80 - 100 fL    MCH 31.2 26.0 - 34.0 pg    MCHC 32.0 32.0 - 36.0 g/dL    RDW 13.5 11.5 - 14.5 %    Platelets 255 150 - 450 x10*3/uL    MPV 10.2 7.5 - 11.5 fL   Transthoracic Echo (TTE) Limited   Result Value Ref Range    LV A4C EF 44.5      Transthoracic Echo (TTE) Limited    Result Date: 10/30/2023   [facility], [address] [place] [address]                Tel [phone] and Fax [phone] TRANSTHORACIC ECHOCARDIOGRAM REPORT  Patient Name:      KIMBERLY Burris Physician:    [id number] Rogelio                                                                Jose WINKLER Study Date:        10/30/2023           Ordering Provider:    [id number] ONUR SHEPARD MRN/PID:           [id number]             Fellow:               [id number] Neeraj Cueto MD PhD Accession#:        [id number]         Nurse: Date of Birth/Age: 1935 / 88 years Sonographer:          Malgorzata Terrazas RDCS Gender:            M                    Additional Staff: Height:            167.64 cm            Admit Date: Weight:            65.77 kg             Admission Status:     Inpatient -                                                               Routine BSA:               1.74 m2              Encounter#:           [id number]                                         Department Location:  [facility]                                                               Cath Lab Blood Pressure: 106 /72 mmHg Study Type:    TRANSTHORACIC ECHO (TTE) LIMITED Diagnosis/ICD: Nonrheumatic aortic (valve) stenosis-I35.0 Indication:    TAVR periprocedure  Study Detail: The following Echo studies were performed: 2D, M-Mode, Doppler and               color flow.  PHYSICIAN INTERPRETATION: Left Ventricle: The left ventricular systolic function is mildly decreased, with an estimated ejection fraction of 40-45%. There is global hypokinesis of the left ventricle with minor regional variations. The left ventricular cavity size is normal. Left ventricular diastolic filling was not assessed. Left Atrium: The left atrium is severely dilated. No formal bubble study was performed however bubbles are seen in the LV and LA post TAVR suggesting presence of L-R shunt. Recommend formal bubble study. Right Ventricle: The right ventricle was not well visualized. There is normal right ventricular global systolic function. A device is visualized in the right ventricle. Right  Atrium: The right atrium is dilated. There is a device visualized in the right atrium. Aortic Valve: The aortic valve is probably trileaflet. There is moderate to severe aortic valve cusp calcification. There is aortic valve annular calcification. There is evidence of severe aortic valve stenosis. The aortic valve dimensionless index is 0.20. There is mild to moderate aortic valve regurgitation. The peak instantaneous gradient of the aortic valve is 45.2 mmHg. The mean gradient of the aortic valve is 27.0 mmHg. Mitral Valve: The mitral valve is mildly thickened. There is moderate to severe mitral annular calcification. There is moderate to severe mitral valve regurgitation. There is flow reversal is the left pulmonary vein. Tricuspid Valve: The tricuspid valve is structurally normal. There is moderate to severe tricuspid regurgitation. The Doppler estimated RVSP is moderately elevated at 60.5 mmHg. Pulmonic Valve: The pulmonic valve is not well visualized. There is physiologic pulmonic valve regurgitation. Pericardium: There is a trivial pericardial effusion. Aorta: The aortic root is abnormal. There is mild dilatation of the ascending aorta. There is mild dilatation of the aortic root. Pulmonary Veins: There is flow reversal in the left pulmonary vein. In comparison to the previous echocardiogram(s): There are no prior studies on this patient for comparison purposes.  Post Transcatheter Aortic Valve Placement (TAVR): The peak instantaneous gradient of the aortic valve is 6.2 mmHg. The mean gradient of the aortic valve is 3.0 mmHg. There is a [employer]tr[employer] transcatheter aortic valve replacement, with a 34mm reported size. There was an improvement in aortic transvalvular gradients (now mean gradient 3 mmHg, peak gradient 6 mmHg, DI 0.7). There is a mild-to moderate chel-prosthetic regurgitation with one anterior and one posterior jet (A<P).  CONCLUSIONS:  1. Left ventricular systolic function is mildly decreased with a  40-45% estimated ejection fraction.  2. There is global hypokinesis of the left ventricle with minor regional variations.  3. The left atrium is severely dilated.  4. Moderate to severe mitral valve regurgitation.  5. Severe aortic valve stenosis.  6. Mild to moderate aortic valve regurgitation.  7. There is moderate to severe aortic valve cusp calcification.  8. There is moderate to severe mitral annular calcification.  9. There is aortic valve annular calcification. 10. Moderate to severe tricuspid regurgitation visualized. 11. Moderately elevated right ventricular systolic pressure. 12. Post TAVR - There was an improvement in aortic transvalvular gradients (now mean gradient 3 mmHg, peak gradient 6 mmHg, DI 0.7). There is a mild-to moderate chel-prosthetic regurgitation with one anterior and one posterior jet (A<P). 13. No formal bubble study was performed however bubbles are seen in the LV and LA post TAVR suggesting presence of L-R shunt. Recommend formal bubble study. QUANTITATIVE DATA SUMMARY: 2D MEASUREMENTS:                           Normal Ranges: IVSd:          0.90 cm    (0.6-1.1cm) LVPWd:         1.00 cm    (0.6-1.1cm) LVIDd:         5.30 cm    (3.9-5.9cm) LVIDs:         3.80 cm LV Mass Index: 107.3 g/m2 LV % FS        28.3 % LA VOLUME:                               Normal Ranges: LA Vol A4C:        83.9 ml    (22+/-6mL/m2) LA Vol A2C:        97.9 ml LA Vol BP:         92.4 ml LA Vol Index A4C:  48.1ml/m2 LA Vol Index A2C:  56.1 ml/m2 LA Vol Index BP:   52.9 ml/m2 LA Area A4C:       24.9 cm2 LA Area A2C:       26.4 cm2 LA Major Axis A4C: 6.3 cm LA Major Axis A2C: 6.0 cm AORTA MEASUREMENTS:                    Normal Ranges: Asc Ao, d: 4.00 cm (2.1-3.4cm) LV SYSTOLIC FUNCTION BY 2D PLANIMETRY (MOD):                     Normal Ranges: EF-A4C View: 44.5 % (>=55%) MITRAL VALVE:                      Normal Ranges: MV Vmax:    1.56 m/s (<=1.3m/s) MV peak P.7 mmHg (<5mmHg) MV mean P.0 mmHg (<2mmHg)  MITRAL INSUFFICIENCY:                           Normal Ranges: PISA Radius:  0.7 cm MR VTI:       236.00 cm MR Vmax:      661.00 cm/s MR Alias Angel: 38.5 cm/s MR Volume:    42.32 ml MR Flow Rt:   118.53 ml/s MR EROA:      0.18 cm2 AORTIC VALVE:                                              Normal Ranges: AoV Vmax:                          3.36 m/s  (<=1.7m/s) AoV Vmax Post TAVR:                1.24 m/s  (<=1.7m/s) AoV Peak P.2 mmHg (<20mmHg) AoV Peak PG Post TAVR:             6.2 mmHg  (<20mmHg) AoV Mean P.0 mmHg (1.7-11.5mmHg) AoV Mean PG Post TAVR:             3.0 mmHg  (1.7-11.5mmHg) LVOT Max Angel:                      0.72 m/s  (<=1.1m/s) LVOT Max Angel Post TAVR:            0.84 m/s  (<=1.1m/s) AoV VTI:                           79.90 cm  (18-25cm) AoV VTI Post TAVR:                 24.90 cm  (18-25cm) LVOT VTI:                          15.90 cm LVOT VTI Post TAVR:                18.40 cm LVOT Diameter:                     2.00 cm   (1.8-2.4cm) LVOT Diameter Post TAVR:           2.00 cm   (1.8-2.4cm) AoV Area, VTI:                     0.63 cm2  (2.5-5.5cm2) AoV Area, VTI Post TAVR:           2.32 cm2  (2.5-5.5cm2) AoV Area,Vmax:                     0.68 cm2  (2.5-4.5cm2) AoV Area,Vmax Post TAVR:           2.13 cm2  (2.5-4.5cm2) AoV Dimensionless Index:           0.20 AoV Dimensionless Index Post TAVR: 0.74 TRICUSPID VALVE/RVSP:                             Normal Ranges: Peak TR Velocity: 3.79 m/s RV Syst Pressure: 60.5 mmHg (< 30mmHg)  [id number] Rogelio Garcia MD Electronically signed on 10/30/2023 at 3:40:02 PM  ** Final **     CT TAVR full contrast chest abdomen pelvis    Result Date: 10/2/2023  Interpreted By:  Eran Galloway and Bamfo Magui STUDY: CT TAVR FULL CONTRAST CHEST ABDOMEN PELVIS;  10/2/2023 1:52 pm   INDICATION: Signs/Symptoms:I35.0.   Per EMR:  88-year-old former smoker with severe symptomatic aortic stenosis, who underwent a failed TAVR an outside  institution. Presents to clinic for further evaluation and consideration of SAVR. A transfemoral TAVR was scheduled but failed because of tortuosity of the vessels.   COMPARISON: CTA on 06/22/2023   ACCESSION NUMBER(S): [id number]   ORDERING CLINICIAN: CLARK RODRIGUEZ   TECHNIQUE: Multi-detector CT technology was employed. Initial limited axial imaging of chest with prospective gating is performed. Following this,helical multi-detector acquisition of the chest with retrospective gating and minimal slice thickness was performed following the intravenous administration of contrast material. Subsequently, contrast enhanced non-gated CT examination of the abdomen and pelvis was obtained. A low-osmolar contrast agent was used (80 mLOmnipaque 350).   For optimization of anatomic evaluation, multiplanar reconstruction, maximum intensity projections, and advanced 3-D off-line postprocessing were performed on a dedicated stand-alone workstation under the direct supervision of the interpreting physician.   FINDINGS: Potential study limitations:  None.   THORACIC AORTA AND HEART: The thoracic aorta normal in course and caliber with moderate to severe calcified atherosclerosis. There is no evidence for acute aortic pathology, such as dissection, intramural hematoma, or contained rupture. The sinotubular junction is preserved. The arch vessel branching pattern is conventional. There is prominent focal calcified atherosclerotic plaque of proximal intrathoracic portion of left subclavian artery with at least moderate stenosis (axial image 27 of 247, series 22). Normal atrioventricular and ventriculoarterial concordance. Moderate cardiomegaly with biatrial enlargement. There is a left subclavian approach dual chamber cardiac pacemaker/ICD seen in place with leads terminating within right atrium and apical right ventricle. There are significant calcifications of aortic valve, in keeping with patients history of aortic stenosis.There  are also mild mitral annular calcifications seen. Moderate Normal coronary artery origins.Severe coronary artery calcifications seen with multiple coronary artery stents. Please note,the study is not optimized for evaluation of coronary arteries. There is no pericardial effusion seen.   PULMONARY ARTERIES Main pulmonary artery and its branches are normal in caliber (MPA-2.7 cm). Although the study is not tailored for evaluation of pulmonary arteries, there are no discrete filling defects within the pulmonary arteries or its opacified branches to suggest pulmonary embolism.   SYSTEMIC AND PULMONARY VEINS Normal systemic venous and pulmonary venous return. The SVC and IVC are of normal caliber. Normal pulmonary venous anatomy.   CHEST: The visualized thyroid gland is within normal limits. There are multiple prominent to mildly enlarged mediastinal lymph nodes seen, measuring up to 1.1 cm in the lower paratracheal region (series 22 image 90) and 1.2 cm in the right hilum (series 22, image 114), which are nonspecific, but stable size from prior exam on 06/22/2023 and felt to be likely reactive in nature. Esophagus is within normal limits.   The trachea and central airways are patent. No endobronchial lesion is seen. Subpleural reticulation with atelectasis/scarring noted in the bilateral lung bases, right greater than left. There are multiple small scattered predominantly calcified pulmonary nodules bilaterally, favoring benign etiology such as granulomas. There is a new subpleural 5 mm solid nodule within left lower lobe. No pleural effusion or pneumothorax.   ABDOMINAL AORTA: The abdominal aorta significantly tortuous. There are severe scattered calcified and non calcified atherosclerotic plaques involving the abdominal aorta and its branches. There is also moderate tortuosity of bilateral common iliac and external iliac arteries. The celiac, SMA, LUIS ENRIQUE and bilateral renal arteries are normal in caliber.   Ectatic right  common iliac artery measuring 1.9 x 1.7 cm Ectatic left common iliac measuring 1.9 x 1.9 cm Ectatic right internal iliac artery measuring 1.4 cm Ectatic left internal iliac artery measuring 0.9 cm.   ABDOMEN AND PELVIS:   HEPATOBILIARY SYSTEM: The liver is normal in size. There is a calcified granuloma in the hepatic dome. There is no evidence of focal liver lesion   GALLBLADDER: The gallbladder is nondistended without evidence of radiopaque stones.  The intrahepatic and extrahepatic bile ducts are not dilated.   PANCREAS: The pancreas is within normal limits.No suspicious focal lesions identified.   SPLEEN: The spleen is normal in size.No suspicious focal lesions are seen.   ADRENAL GLANDS: The bilateral adrenal glands are unremarkable in appearance.   KIDNEYS AND URETERS: The bilateral kidneys are normal in size and enhance symmetrically. There are few small bilateral renal hypodense lesions, with the largest measuring 1.1 cm on the right (axial image 83 of 382, series 10), which are suboptimally assessed, but statistically represent simple cysts.. There is no evidence of hydroureteronephrosis or nephroureterolithiasis.   GI TRACT: The stomach is unremarkable.  The small bowel is normal in caliber without evidence of focal wall thickening or obstruction. However, there are multiple prominent mildly dilated enhancing vessels within the wall of multiple small bowel loops in the mid abdomen, particularly the jejunum (for example, axial image 161, 178 and 210 of 382). The appendix is visualized and appears normal.  There is no evidence of focal wall thickening or dilatation of the large bowel. Extensive colonic diverticulosis without CT evidence of acute diverticulitis.   PERITONEUM/RETROPERITONEUM/LYMPH NODES: No abdominopelvic lymphadenopathy is present.  There is no focal fluid collection, free intraperitoneal air, or ascites.   GENITOURINARY SYSTEM: Within the pelvis, the urinary bladder is under distended,  limiting evaluation. Underlying wall thickening is not excluded. Prostate gland is normal in size with small coarse calcification. Otherwise, there are no pelvic masses.   BODY WALL AND OSSEOUS STRUCTURES: Soft tissues of body wall are within normal limits. No acute osseous pathology.There is an ovoid lucent lesion of S1 with central sclerosis measuring 1.6 x 1.7 cm (series 9, image 107; series 11, image 103). There is S shaped scoliosis of thoracolumbar spine with diffuse bony demineralization. Extensive multilevel degenerative changes within visualized thoracic spine, most significant at L3-L4 and L4-L5.       1. Extensive atherosclerotic changes involving the thoracoabdominal aorta and its branches. The access vessels are patent. There is again demonstration of significant tortuosity of abdominal aorta and to lesser extent bilateral iliac arteries. 2. Severe calcifications of the aortic valve correlating with history of aortic stenosis 3. There is prominent focal calcified atherosclerotic plaque of proximal intrathoracic portion of left subclavian artery with severe stenosis. Correlate clinically with concern for subclavian steal phenomenon. 4. Moderate cardiomegaly with biatrial enlargement. 5. Severe coronary artery calcifications with underlying multiple stents. Please note the present study is not tailored for evaluation of coronary arteries. 6. A new 5 mm solid left lower lobe pulmonary nodule, likely benign. No further follow-up is required, however, if the patient has high risk factors for primary lung malignancy, follow-up noncontrast CT scan chest in 12 months may be obtained. (Chriss Chowdary et al., Guidelines for management of incidental pulmonary nodules detected on CT images: From the Fleischner Society 2017, Radiology. 2017 Jul;284 (1):228-243.) [email or web address] 7. Stable appearance of nonspecific mild mediastinal and right hilar lymphadenopathy, likely reactive. 8. Multiple prominent mildly  dilated enhancing vessels within the wall of multiple small bowel loops. Further assessment is limited on present single phase CT scan, however, findings may represent changes of small-bowel angioectasia/angiodysplasia. Correlate with patient's symptoms such as anemia/GI blood loss and further evaluation with endoscopy can be obtained as clinically warranted. 9. Limited assessment of urinary bladder because of underdistention and a component of bladder wall thickening is not excluded. Correlate with patient's symptoms and if clinically warranted, further evaluation with cystoscopy can be performed. 10. Indeterminate ovoid lucent lesion of S1 with central sclerosis. While the lesion has overall benign morphological appearance, consider comparing with older CT scans to ensure stability. Otherwise, follow-up imaging in 3-6 months can be considered.   I personally reviewed the images/study and I agree with radiology resident Dr. Magui Woodward's findings as stated. This study was interpreted at [place], [place].   MACRO: Critical Finding:  There are multiple critical findings. See findings. notification was initiated on 10/3/2023 at 8:50 am by  Eran Galloway.  (**-YCF-**) Instructions:  See above   Signed by: Eran Galloway 10/2/2023 5:02 PM Dictation workstation:   [id number]        Assessment/Plan   88M w/ severe aortic stenosis s/p 10/30 TAVR p/w acute disorientation. Etiology is most likely sedation-related delirium. Less likely CVA considering normal neuro exam. Pt did have some evidence of cerebellar dysfunction on arrival to CICU (abnormal finger-nose test) but this subsequently improved; it is unclear if this was d/t difficulty with following instructions related to delirium.     10/30 18:00 update:  - pt again is displaying evidence of cerebellar dysfunction (abnormal finger-nose test). Also showing signs of word finding difficulty. BAT called. Assessment by neurology and  structural cardiology- no imaging for now, continuing q1hr neuro checks.    Neuro:  #Disorientation  - delirium precautions  - q1hr neuro checks    CV:  #Severe Aortic Stenosis s/p 10/30 TAVR  - Structural Cardiology on board    #Afib  #Sick Sinus Syndrome/ hx of Second Degree AV block s/p pacemaker  - ASA 81mg PO every day  - Clopidogrel 75mg PO every day  - digoxin 62.5mcg PO every day  - sotalol 80mg PO BID  [ ] plan to resume warfarin on 10/31  [ ] followup INR    #HTN  [ ] resume lisinopril 2.5mg PO every day on 10/31 (holding iso contrast)    Resp:  - no acute issues    GI:  - no acute issues    Renal/:  - no acute issues    ID:  - no acute issues    O2: ORA  Pressors: none  F: none  E: none  N: Cardiac Diet  A: PIV  DVT ppx: heparin 5000U subcutaneous q8hr until AC resumption       Randy Mackay MD

## 2023-10-30 NOTE — Clinical Note
The patient experienced an undetermined stroke during the procedure. BAT called. Deemed negative per neuro team. Plan to monitor in CICU

## 2023-10-30 NOTE — H&P
Mr Tate is a 88 year old man referred for evaluation for severe aortic stenosis after failed transfemoral access. He is accompanied by his wife and daughter.     Mr Tate has past medical history as outlined below. Due to symptomatic severe aortic stenosis he had an aborted transfemoral TAVR after a14F E- sheath was placed due to severe tortuosity of the [id number] accesses. The right femoral artery was primary access site.     He was referred for surgical aortic valve replacement and is here for second opinion about alternate strategies for TAVR.     He remains independent with his ADLs but reports fatigue and dyspnea with exertion. Still drives and does yard work, but now more limited with exertion.     The comes today for LFP à TF TAVR as recommended by heart team        NYHA II  Frailty cognition and mobility both 0  EKG - has a pacemaker  TTE 8/8/2023 â€“ per report LVEF 60% aortic valve PV 3m/s, MG 22mmHg, ELFEGO 0.7cm2, DI 0.17   CTA TAVR ( images from [place] reviewed) - 6/22/2023 - Annular size suitable for a 26 Luis Eduardo ultra/ 34 Evolut FX with low risk of coronary obstruction with either prosthesis. There is calcification in LVOT under the LCC.There is a horizontal ascending aorta.There is severe tortuosity of both iliofemoral accesses and the abdominal aorta. While both iliofemoral arterial accesses have adequate caliber for up to 22F sheaths, the right iliofemoral on review of the stretched vessel has a significant kink just before the aorto-iliac bifurcation on the right which might make this challenging  Coronary angiograms - Not available for review at this time, performed in the July 2023. Per report in [facility]     PAST MEDICAL HISTORY  1. Permanent AF, [id number] = 4 ( HTN, Age, PAD)  2. Coronary artery disease S/ P PCI 7/17/2023 - atherectomy + PCI to LMCA and proximal LAD. ( initial complicated by hematoma.   3. Severe aortic valve stenosis Failed TAVR procedure 8/8/2023. Hemostasis of  both were with angioseal.  4. Hypertension   6. Sick Sinus Syndrome, second degree AV Block S/P PPM 2014, S/P generator change 1/2023   7. CKD (chronic kidney disease), stage III   8. Iron deficiency anemia   9. PAD (peripheral artery disease)         Active Problems  Problems    · Aortic stenosis (424.1) (I35.0)   · Shellfish allergy (V15.04) (Z91.013)     Surgical History  Problems    · History of Cardiac catheterization with stent placement   · History of Inguinal hernia repair   · History of Pacemaker insertion     Past Medical History  Problems    · History of aortic valve stenosis (V12.59) (Z86.79)   · History of atrial fibrillation (V12.59) (Z86.79)   · History of cardiac pacemaker (V12.50,V45.01) (Z95.0)   · History of chronic kidney disease (V13.09) (Z87.448)   · History of coronary artery disease (V12.59) (Z86.79)   · History of hyperlipidemia (V12.29) (Z86.39)   · History of hypertension (V12.59) (Z86.79)   · History of inguinal hernia (V12.79) (Z87.19)   · History of iron deficiency anemia (V12.3) (Z86.2)   · History of peripheral arterial disease (V12.59) (Z86.79)   · History of Seasonal allergies (477.9) (J30.2)     Current Meds     Medication Name Instruction   Aspirin Low Dose 81 MG Oral Tablet Delayed Release TAKE 1 TABLET DAILY.   Clopidogrel Bisulfate 75 MG Oral Tablet TAKE 1 TABLET DAILY.   Co Q-10 200 MG Oral Capsule TAKE 1 CAPSULE Daily   Digoxin 125 MCG Oral Tablet TAKE 0.5 TABLET Daily   diphenhydrAMINE HCl - 50 MG Oral Capsule Please take 50mg PO 1 hour before your procedure requiring IV contrast   Ezetimibe 10 MG Oral Tablet TAKE 1 TABLET DAILY.   Famotidine 20 MG Oral Tablet TAKE 0.5 TABLET Twice daily PRN   Ferrous Sulfate 325 (65 Fe) MG Oral Tablet TAKE 1 TABLET DAILY AS DIRECTED.   Glucosamine 1500 Complex Oral Capsule TAKE 2 CAPSULE Daily   Lisinopril 2.5 MG Oral Tablet TAKE 1 TABLET DAILY AS DIRECTED.   Loratadine 10 MG Oral Tablet TAKE 0.5 TABLET Daily PRN   Melatonin 5 MG Oral  Tablet TAKE TABLET Bedtime PRN   Multi-Vitamin/Minerals Oral Tablet TAKE 1 TABLET DAILY.   Omega-3 1000 MG Oral Capsule TAKE 1 CAPSULE Daily   predniSONE 50 MG Oral Tablet Take one tablet PO 13 hours, 7 hours, and 1 hour before your procedure requiring IV Contrast (3 tablets total)   PreserVision AREDS Oral Tablet TAKE 1 TABLET EVERY 12 HOURS.   Sotalol HCl - 80 MG Oral Tablet TAKE 1 TABLET TWICE DAILY.   Vabysmo 6 MG/0.05ML Intravitreal Solution every eight weeks.   Warfarin Sodium 3 MG Oral Tablet TAKE 1 TABLET DAILY.      Allergies  Medication    · Statins   Adverse Reaction; Elevated hepatic enzymes; Recorded By: Rosas Van; 8/21/2023 12:05:58 PM  NonMedication    · Shellfish   Adverse Reaction; Nausea; Vomiting; Recorded By: Rosas aVn; 8/21/2023 12:05:58 PM     Family History  Mother    · Family history of cardiac disorder (V17.49) (Z82.49)   · Family history of Paget's disease  Brother    · Family history of type 2 diabetes mellitus (V18.0) (Z83.3)     Social History  Problems    · Former smoker (V15.82) (Z87.891)   · No illicit drug use   · Occasional alcohol use     Vitals  Vital Signs   reviewed   Physical Exam     Constitutional:  appears healthy, within normal limits of ideal weight and appearance reflects stated age.   Pulmonary:  Respiratory rate: normal. Assessment of respiratory effort revealed normal rhythm and effort.   Cardiovascular:  no pitting edema present.   Abdomen:  The abdomen was flat.   Skin:. warm and well perfused, no rash.   Neurologic:. normal gait.   Psychiatric  Mental Status: oriented to person, place, and time. Appropriate mood and affect.      Scores and Scales     [place] Cardiomyopathy Questionnaire (KCCQ - 12): - Heart failure affects different people in different ways. Please indicate how much you are limited by heart failure (shortness of breath or fatigue) in your ability to do the following activities over the past 2 weeks.   a. Showering/Bathing - Not at all  limited (5).    b. Walking 1 block on level ground - Not at all limited (5).    c. Hurrying or jogging - Slightly limited (4).   2. Over the past 2 weeks, how many times did you have swelling in your feet, ankles or legs when you woke up in the morning?.   Never over the past 2 weeks (5).   3. Over the past 2 weeks, on average, how many times has fatigue limited your ability to do what you wanted?.   1 - 2 times per week (5).   4. Over the past 2 weeks, on average, how many times has shortness of breath limited your ability to do what you wanted?.   Less than once a week (6).   5. Over the past 2 weeks, on average, how many times have you been forced to sleep sitting up in a chair or with at least 3 pillows to prop you up because of shortness of breath?.   Never over the past 2 weeks (5).   6. Over the past 2 weeks, how much has your heart failure limited your enjoyment of life?.   It has slightly limited my enjoyment of life (4).   7. If you had to spend the rest of your life with your heart failure the way it is right now, how would you feel about this?.   Mostly satisfied (4).   8. How much does your heart failure affect your lifestyle? Please indicate how your heart failure may have limited your participation in the following activities over the past 2 weeks.   a. Hobbies, recreational activities - Slightly limited (4).    b. Working or doing household chores - Did not limit at all (5).    c. Visiting family or friends out of your home - Did not limit at all (5).   Five Meter Walk Test:   Time 1: 7.07 seconds                  Impressions     1. Severe aortic stenosis.  2. Sick sinus syndrome s/p permanent pacemaker  3. Permanent atrial fibrillation.  4. Peripheral arterial disease.  5. Coronary artery disease s/p LMCA stent July 2023.        Mr. Tate has symptomatic severe aortic stenosis. He will benefit from aortic valve replacement.     Although there was limitation from right iliofemoral access with a  balloon expandable valve, on review of his previous CT scans from [facility], the lelft iliofemoral access since it has less kinking, appears feasible with self expanding valve, long sheath and two extra stiff wires.     Proceed with TAVR as recommended by heart team. The rationale and risks were discussed. He remains willing to proceed with planned left iliofemoral TAVR.

## 2023-10-30 NOTE — CONSULTS
Chief Complaint: dysmetria, aphasia    History of Present Illness:   Javier Tate is a 88 y.o. male with history of afib (warfarin, sotalol, digoxin), CAD (7/2023 PCI), HTN, and severe aortic stenosis s/p TAVR who is currently admitted to CICU after TAVR procedure today. BAT called at 18:28 for worsening dysmetria and aphasia.     Per chart review: LKW unclear. There was reports of confusion during the procedure. TAVR was successfully placed but pt continued to display evidence of problems with comprehension in post-op despite discontinuation of sedation so a BAT was called and pt was admitted to the CICU.    BAT called earlier today when patient was noted to have RUE shaking/tremor after TAVR procedure. NIHSS 6 for LOC question (1), Mild aphasia (1), and bilateral lower extremity effort vs grav (4) due to inability to maintain legs antigravity with recent groin puncture. BAT was cancelled.     Per primary team: After initially BAT cancelled, patient was noted to have ataxia on exam which resolved on exam ~17:05 (able to perform finger-to-nose). At the next exam ~18:10, he was noted to have worsening dysmetria and aphasia. Per family at bedside, they felt his speech was no different after his procedure as it was during our exam.   On ~18:30 exam, NIHSS 5 (1 commands, 2 ataxia, 1 language, 1 extinction).     Medical/Surgical Hx: as above  Family Hx: unknown   Social Hx:  - remote tobacco use  - occasional alcohol use  - denies illicit drug use    Allergies:   Allergies   Allergen Reactions    Adhesive Tape-Silicones Other     Skin irritation    Statins-Hmg-Coa Reductase Inhibitors Other    Shellfish Containing Products Nausea And Vomiting     ---------------------------------------------------------------    NIH Stroke Scale  1a  Level of consciousness: 0=alert; keenly responsive   1b. LOC questions:  0=Performs both tasks correctly   1c. LOC commands: 1=Performs one task correctly   2.  Best Gaze: 0=normal   3.   Visual: 0=No visual loss   4. Facial Palsy: 0=Normal symmetric movement   5a.  Motor left arm: 0=No drift, limb holds 90 (or 45) degrees for full 10 seconds   5b.  Motor right arm: 0=No drift, limb holds 90 (or 45) degrees for full 10 seconds   6a. motor left le=No drift, limb holds 90 (or 45) degrees for full 10 seconds   6b  Motor right le=No drift, limb holds 90 (or 45) degrees for full 10 seconds   7. Limb Ataxia: 2=Present in two limbs   8.  Sensory: 0=Normal; no sensory loss   9. Best Language:  1=Mild to moderate aphasia; some obvious loss of fluency or facility of comprehension without significant limitation on ideas expressed or form of expression.   10. Dysarthria: 0=Normal   11. Extinction and Inattention: 1=Visual, tactile, auditory, spatial or personal inattention or extinction to bilateral simultaneous stimulation in one of the sensory modalities   12. Distal motor function: 0=Normal    Total:   5     IV Thrombolysis IV Thrombolysis Checklist  Unclear LKW, possible >4.5 hours    24 Hour Vitals:  Heart Rate:  [70-79] 70  Resp:  [12-23] 20  BP: ()/() 132/82    Labs:  Results from last 72 hours   Lab Units 10/30/23  1616 10/30/23  0911   WBC AUTO x10*3/uL 7.4 7.1   HEMOGLOBIN g/dL 11.9* 11.2*      Results from last 72 hours   Lab Units 10/30/23  1616 10/30/23  0911   SODIUM mmol/L 142 140   POTASSIUM mmol/L 4.7 4.8   CHLORIDE mmol/L 104 104   BUN mg/dL 19 19   CREATININE mg/dL 0.98 0.99   MAGNESIUM mg/dL 2.12  --    PHOSPHORUS mg/dL 3.8  --         Results from last 7 days   Lab Units 10/30/23  1616   APTT seconds 34   INR  1.2*     Lab Results   Component Value Date    ALT 17 10/30/2023    AST 22 10/30/2023    ALKPHOS 80 10/30/2023    BILITOT 0.6 10/30/2023       Pain Management Panel           No data to display                Medications:  Scheduled medications  [Held by provider] aspirin, 81 mg, oral, Daily  [Held by provider] clopidogrel, 75 mg, oral, Daily  [START ON 10/31/2023]  digoxin, 62.5 mcg, oral, Daily  [START ON 10/31/2023] ferrous sulfate, 1 tablet, oral, Daily with breakfast  [Held by provider] heparin (porcine), 5,000 Units, subcutaneous, q8h  perflutren lipid microspheres, 0.5-10 mL of dilution, intravenous, Once in imaging  perflutren lipid microspheres, 0.5-10 mL of dilution, intravenous, Once in imaging  [START ON 10/31/2023] perflutren lipid microspheres, 4 mL of dilution, intravenous, Once in imaging  perflutren protein A microsphere, 0.5 mL, intravenous, Once in imaging  perflutren protein A microsphere, 0.5 mL, intravenous, Once in imaging  sotalol, 80 mg, oral, BID  sulfur hexafluoride microsphr, 2 mL, intravenous, Once in imaging  sulfur hexafluoride microsphr, 2 mL, intravenous, Once in imaging      Continuous medications     PRN medications  PRN medications: melatonin    Imaging:  No MRI head results found for the past 12 months  No CT head results found for the past 12 months    Other Recent/Relevant Studies:  Transthoracic Echo (TTE) Limited    Result Date: 10/30/2023   [facility], [address] [place] [address]                Tel [phone] and Fax [phone] TRANSTHORACIC ECHOCARDIOGRAM REPORT  Patient Name:      KIMBERLY Burris Physician:    [id number] Rogelio Garcia MD Study Date:        10/30/2023           Ordering Provider:    [id number] ONUR SHEPARD MRN/PID:           [id number]             Fellow:               [id number] Neeraj Cueto MD PhD Accession#:        [id number]         Nurse: Date of Birth/Age: 1935 / 88 years Sonographer:          Malgorzata Terrazas [facility] Gender:            M                    Additional Staff: Height:            167.64 cm            Admit Date: Weight:            65.77 kg             Admission Status:      Inpatient -                                                               Routine BSA:               1.74 m2              Encounter#:           [id number]                                         Department Location:  [facility]                                                               Cath Lab Blood Pressure: 106 /72 mmHg Study Type:    TRANSTHORACIC ECHO (TTE) LIMITED Diagnosis/ICD: Nonrheumatic aortic (valve) stenosis-I35.0 Indication:    TAVR periprocedure  Study Detail: The following Echo studies were performed: 2D, M-Mode, Doppler and               color flow.  PHYSICIAN INTERPRETATION: Left Ventricle: The left ventricular systolic function is mildly decreased, with an estimated ejection fraction of 40-45%. There is global hypokinesis of the left ventricle with minor regional variations. The left ventricular cavity size is normal. Left ventricular diastolic filling was not assessed. Left Atrium: The left atrium is severely dilated. No formal bubble study was performed however bubbles are seen in the LV and LA post TAVR suggesting presence of L-R shunt. Recommend formal bubble study. Right Ventricle: The right ventricle was not well visualized. There is normal right ventricular global systolic function. A device is visualized in the right ventricle. Right Atrium: The right atrium is dilated. There is a device visualized in the right atrium. Aortic Valve: The aortic valve is probably trileaflet. There is moderate to severe aortic valve cusp calcification. There is aortic valve annular calcification. There is evidence of severe aortic valve stenosis. The aortic valve dimensionless index is 0.20. There is mild to moderate aortic valve regurgitation. The peak instantaneous gradient of the aortic valve is 45.2 mmHg. The mean gradient of the aortic valve is 27.0 mmHg. Mitral Valve: The mitral valve is mildly thickened. There is moderate to severe mitral annular calcification. There is moderate to severe mitral  valve regurgitation. There is flow reversal is the left pulmonary vein. Tricuspid Valve: The tricuspid valve is structurally normal. There is moderate to severe tricuspid regurgitation. The Doppler estimated RVSP is moderately elevated at 60.5 mmHg. Pulmonic Valve: The pulmonic valve is not well visualized. There is physiologic pulmonic valve regurgitation. Pericardium: There is a trivial pericardial effusion. Aorta: The aortic root is abnormal. There is mild dilatation of the ascending aorta. There is mild dilatation of the aortic root. Pulmonary Veins: There is flow reversal in the left pulmonary vein. In comparison to the previous echocardiogram(s): There are no prior studies on this patient for comparison purposes.  Post Transcatheter Aortic Valve Placement (TAVR): The peak instantaneous gradient of the aortic valve is 6.2 mmHg. The mean gradient of the aortic valve is 3.0 mmHg. There is a Medtronic transcatheter aortic valve replacement, with a 34mm reported size. There was an improvement in aortic transvalvular gradients (now mean gradient 3 mmHg, peak gradient 6 mmHg, DI 0.7). There is a mild-to moderate chel-prosthetic regurgitation with one anterior and one posterior jet (A<P).  CONCLUSIONS:  1. Left ventricular systolic function is mildly decreased with a 40-45% estimated ejection fraction.  2. There is global hypokinesis of the left ventricle with minor regional variations.  3. The left atrium is severely dilated.  4. Moderate to severe mitral valve regurgitation.  5. Severe aortic valve stenosis.  6. Mild to moderate aortic valve regurgitation.  7. There is moderate to severe aortic valve cusp calcification.  8. There is moderate to severe mitral annular calcification.  9. There is aortic valve annular calcification. 10. Moderate to severe tricuspid regurgitation visualized. 11. Moderately elevated right ventricular systolic pressure. 12. Post TAVR - There was an improvement in aortic transvalvular  gradients (now mean gradient 3 mmHg, peak gradient 6 mmHg, DI 0.7). There is a mild-to moderate chel-prosthetic regurgitation with one anterior and one posterior jet (A<P). 13. No formal bubble study was performed however bubbles are seen in the LV and LA post TAVR suggesting presence of L-R shunt. Recommend formal bubble study. QUANTITATIVE DATA SUMMARY: 2D MEASUREMENTS:                           Normal Ranges: IVSd:          0.90 cm    (0.6-1.1cm) LVPWd:         1.00 cm    (0.6-1.1cm) LVIDd:         5.30 cm    (3.9-5.9cm) LVIDs:         3.80 cm LV Mass Index: 107.3 g/m2 LV % FS        28.3 % LA VOLUME:                               Normal Ranges: LA Vol A4C:        83.9 ml    (22+/-6mL/m2) LA Vol A2C:        97.9 ml LA Vol BP:         92.4 ml LA Vol Index A4C:  48.1ml/m2 LA Vol Index A2C:  56.1 ml/m2 LA Vol Index BP:   52.9 ml/m2 LA Area A4C:       24.9 cm2 LA Area A2C:       26.4 cm2 LA Major Axis A4C: 6.3 cm LA Major Axis A2C: 6.0 cm AORTA MEASUREMENTS:                    Normal Ranges: Asc Ao, d: 4.00 cm (2.1-3.4cm) LV SYSTOLIC FUNCTION BY 2D PLANIMETRY (MOD):                     Normal Ranges: EF-A4C View: 44.5 % (>=55%) MITRAL VALVE:                      Normal Ranges: MV Vmax:    1.56 m/s (<=1.3m/s) MV peak P.7 mmHg (<5mmHg) MV mean P.0 mmHg (<2mmHg) MITRAL INSUFFICIENCY:                           Normal Ranges: PISA Radius:  0.7 cm MR VTI:       236.00 cm MR Vmax:      661.00 cm/s MR Alias Angel: 38.5 cm/s MR Volume:    42.32 ml MR Flow Rt:   118.53 ml/s MR EROA:      0.18 cm2 AORTIC VALVE:                                              Normal Ranges: AoV Vmax:                          3.36 m/s  (<=1.7m/s) AoV Vmax Post TAVR:                1.24 m/s  (<=1.7m/s) AoV Peak P.2 mmHg (<20mmHg) AoV Peak PG Post TAVR:             6.2 mmHg  (<20mmHg) AoV Mean P.0 mmHg (1.7-11.5mmHg) AoV Mean PG Post TAVR:             3.0 mmHg  (1.7-11.5mmHg) LVOT Max Angel:                       0.72 m/s  (<=1.1m/s) LVOT Max Angel Post TAVR:            0.84 m/s  (<=1.1m/s) AoV VTI:                           79.90 cm  (18-25cm) AoV VTI Post TAVR:                 24.90 cm  (18-25cm) LVOT VTI:                          15.90 cm LVOT VTI Post TAVR:                18.40 cm LVOT Diameter:                     2.00 cm   (1.8-2.4cm) LVOT Diameter Post TAVR:           2.00 cm   (1.8-2.4cm) AoV Area, VTI:                     0.63 cm2  (2.5-5.5cm2) AoV Area, VTI Post TAVR:           2.32 cm2  (2.5-5.5cm2) AoV Area,Vmax:                     0.68 cm2  (2.5-4.5cm2) AoV Area,Vmax Post TAVR:           2.13 cm2  (2.5-4.5cm2) AoV Dimensionless Index:           0.20 AoV Dimensionless Index Post TAVR: 0.74 TRICUSPID VALVE/RVSP:                             Normal Ranges: Peak TR Velocity: 3.79 m/s RV Syst Pressure: 60.5 mmHg (< 30mmHg)  [id number] Rogelio Garcia MD Electronically signed on 10/30/2023 at 3:40:02 PM  ** Final **      ---------------------------------------------------------------    Assessment:  Javier Tate is a 88 y.o. male with history of afib (warfarin, sotalol, digoxin), CAD (7/2023 PCI), HTN, and severe aortic stenosis s/p TAVR who is currently admitted to CICU after TAVR procedure today. BAT called at 18:28 for worsening dysmetria and aphasia. NIHSS 5 (1 commands, 2 ataxia, 1 language, 1 extinction). Post TAVR TTE showed EF 40-45%, global hypokinesis of LV, LA severely dilated, no formal bubble study. Given his recent TAVR, it is possible he may have had small cardioembolic infarcts. Not a candidate for TNK given unclear LKW time. Not a candidate for thrombectomy given no clear VAN signs on exam. Would recommend brain imaging to evaluate stroke burden.     Recommendations:   - If no contraindications: we recommend MRI brain without contrast and MRA head and neck without contrast to evaluate stroke burden  - If unable to obtain MRI, please repeat CT head and CTA head and neck on 10/31 AM  -  Please obtain lipid panel and HbA1c  - If patient has acutely worsening neurological exam, please call stroke code for urgent evaluation     Will staff with attending in AM.   Note not final until staffed and signed by attending.    Janet Deal MD  PGY-3 Neurology  Stroke [id number]

## 2023-10-30 NOTE — Clinical Note
Temporary pacemaker tested. Temporary pacemaker location through right femoral vein. Temporary pacemaker connected to demand mode. Heart rate: 100. Current 1 (mA). No loss of capture. Pacer set to back up rate of 50bpm, mA 10

## 2023-10-30 NOTE — SIGNIFICANT EVENT
Cancelled BAT    Javier Tate is 89yo male admitted for TAVR procedure. Post-procedure, patient was noted to have right upper extremity shaking/tremor. BAT was called over overhead announcement.     On arrival of stroke team, patient was alert. Initial NIHSS 6 for LOC question (1), Mild aphasia (1), and bilateral lower extremity effort vs grav (4) due to inability to maintain legs antigravity with recent groin puncture. Aphasia noted as mild difficulty with naming and following commands, which could be explained by general confusion as well. Symptoms felt to be non-focal, likely representing metabolic or medication induced mild encephalopathy given recent procedure in elderly person. RUE shaking noted as large amplitude, low frequency suppressible movements not consistent with stroke or seizure. With no definitive evidence of stroke, BAT was canceled.    Primary team to monitor neurological status. We expect patient condition to improve.

## 2023-10-31 ENCOUNTER — APPOINTMENT (OUTPATIENT)
Dept: CARDIOLOGY | Facility: HOSPITAL | Age: 88
DRG: 266 | End: 2023-10-31
Payer: MEDICARE

## 2023-10-31 ENCOUNTER — APPOINTMENT (OUTPATIENT)
Dept: RADIOLOGY | Facility: HOSPITAL | Age: 88
DRG: 266 | End: 2023-10-31
Payer: MEDICARE

## 2023-10-31 LAB
ALBUMIN SERPL BCP-MCNC: 3.3 G/DL (ref 3.4–5)
ANION GAP SERPL CALC-SCNC: 13 MMOL/L (ref 10–20)
APPEARANCE UR: CLEAR
BASOPHILS # BLD AUTO: 0.04 X10*3/UL (ref 0–0.1)
BASOPHILS NFR BLD AUTO: 0.4 %
BILIRUB UR STRIP.AUTO-MCNC: NEGATIVE MG/DL
BUN SERPL-MCNC: 20 MG/DL (ref 6–23)
CALCIUM SERPL-MCNC: 8.9 MG/DL (ref 8.6–10.6)
CHLORIDE SERPL-SCNC: 106 MMOL/L (ref 98–107)
CO2 SERPL-SCNC: 27 MMOL/L (ref 21–32)
COLOR UR: YELLOW
CREAT SERPL-MCNC: 1 MG/DL (ref 0.5–1.3)
EJECTION FRACTION APICAL 4 CHAMBER: 55.1
EOSINOPHIL # BLD AUTO: 0.17 X10*3/UL (ref 0–0.4)
EOSINOPHIL NFR BLD AUTO: 1.8 %
ERYTHROCYTE [DISTWIDTH] IN BLOOD BY AUTOMATED COUNT: 13.2 % (ref 11.5–14.5)
GFR SERPL CREATININE-BSD FRML MDRD: 73 ML/MIN/1.73M*2
GLUCOSE SERPL-MCNC: 111 MG/DL (ref 74–99)
GLUCOSE UR STRIP.AUTO-MCNC: NEGATIVE MG/DL
HCT VFR BLD AUTO: 28.2 % (ref 41–52)
HGB BLD-MCNC: 9.2 G/DL (ref 13.5–17.5)
HOLD SPECIMEN: NORMAL
IMM GRANULOCYTES # BLD AUTO: 0.04 X10*3/UL (ref 0–0.5)
IMM GRANULOCYTES NFR BLD AUTO: 0.4 % (ref 0–0.9)
KETONES UR STRIP.AUTO-MCNC: NEGATIVE MG/DL
LEUKOCYTE ESTERASE UR QL STRIP.AUTO: NEGATIVE
LYMPHOCYTES # BLD AUTO: 0.76 X10*3/UL (ref 0.8–3)
LYMPHOCYTES NFR BLD AUTO: 7.8 %
MAGNESIUM SERPL-MCNC: 2.05 MG/DL (ref 1.6–2.4)
MCH RBC QN AUTO: 31.2 PG (ref 26–34)
MCHC RBC AUTO-ENTMCNC: 32.6 G/DL (ref 32–36)
MCV RBC AUTO: 96 FL (ref 80–100)
MONOCYTES # BLD AUTO: 1.42 X10*3/UL (ref 0.05–0.8)
MONOCYTES NFR BLD AUTO: 14.6 %
NEUTROPHILS # BLD AUTO: 7.27 X10*3/UL (ref 1.6–5.5)
NEUTROPHILS NFR BLD AUTO: 75 %
NITRITE UR QL STRIP.AUTO: NEGATIVE
NRBC BLD-RTO: 0 /100 WBCS (ref 0–0)
PH UR STRIP.AUTO: 6 [PH]
PHOSPHATE SERPL-MCNC: 3.5 MG/DL (ref 2.5–4.9)
PLATELET # BLD AUTO: 184 X10*3/UL (ref 150–450)
PMV BLD AUTO: 9.5 FL (ref 7.5–11.5)
POTASSIUM SERPL-SCNC: 4.3 MMOL/L (ref 3.5–5.3)
PROT UR STRIP.AUTO-MCNC: NEGATIVE MG/DL
RBC # BLD AUTO: 2.95 X10*6/UL (ref 4.5–5.9)
RBC # UR STRIP.AUTO: NEGATIVE /UL
SODIUM SERPL-SCNC: 142 MMOL/L (ref 136–145)
SP GR UR STRIP.AUTO: 1.03
UROBILINOGEN UR STRIP.AUTO-MCNC: <2 MG/DL
WBC # BLD AUTO: 9.7 X10*3/UL (ref 4.4–11.3)

## 2023-10-31 PROCEDURE — 93306 TTE W/DOPPLER COMPLETE: CPT

## 2023-10-31 PROCEDURE — 70498 CT ANGIOGRAPHY NECK: CPT | Mod: RSC | Performed by: RADIOLOGY

## 2023-10-31 PROCEDURE — 2500000001 HC RX 250 WO HCPCS SELF ADMINISTERED DRUGS (ALT 637 FOR MEDICARE OP): Performed by: NURSE PRACTITIONER

## 2023-10-31 PROCEDURE — 70496 CT ANGIOGRAPHY HEAD: CPT | Mod: RSC | Performed by: RADIOLOGY

## 2023-10-31 PROCEDURE — 70450 CT HEAD/BRAIN W/O DYE: CPT | Mod: MG

## 2023-10-31 PROCEDURE — 96372 THER/PROPH/DIAG INJ SC/IM: CPT

## 2023-10-31 PROCEDURE — 2500000004 HC RX 250 GENERAL PHARMACY W/ HCPCS (ALT 636 FOR OP/ED)

## 2023-10-31 PROCEDURE — 71045 X-RAY EXAM CHEST 1 VIEW: CPT | Mod: FY

## 2023-10-31 PROCEDURE — 99232 SBSQ HOSP IP/OBS MODERATE 35: CPT

## 2023-10-31 PROCEDURE — 97116 GAIT TRAINING THERAPY: CPT | Mod: GP

## 2023-10-31 PROCEDURE — 99291 CRITICAL CARE FIRST HOUR: CPT

## 2023-10-31 PROCEDURE — 97166 OT EVAL MOD COMPLEX 45 MIN: CPT | Mod: GO

## 2023-10-31 PROCEDURE — 81003 URINALYSIS AUTO W/O SCOPE: CPT | Performed by: STUDENT IN AN ORGANIZED HEALTH CARE EDUCATION/TRAINING PROGRAM

## 2023-10-31 PROCEDURE — 83735 ASSAY OF MAGNESIUM: CPT

## 2023-10-31 PROCEDURE — 2500000001 HC RX 250 WO HCPCS SELF ADMINISTERED DRUGS (ALT 637 FOR MEDICARE OP)

## 2023-10-31 PROCEDURE — 2550000001 HC RX 255 CONTRASTS: Performed by: INTERNAL MEDICINE

## 2023-10-31 PROCEDURE — 71045 X-RAY EXAM CHEST 1 VIEW: CPT | Performed by: RADIOLOGY

## 2023-10-31 PROCEDURE — 93306 TTE W/DOPPLER COMPLETE: CPT | Performed by: INTERNAL MEDICINE

## 2023-10-31 PROCEDURE — 97530 THERAPEUTIC ACTIVITIES: CPT | Mod: GO

## 2023-10-31 PROCEDURE — 36415 COLL VENOUS BLD VENIPUNCTURE: CPT

## 2023-10-31 PROCEDURE — 80069 RENAL FUNCTION PANEL: CPT

## 2023-10-31 PROCEDURE — 70496 CT ANGIOGRAPHY HEAD: CPT | Mod: RSC,ME

## 2023-10-31 PROCEDURE — 70498 CT ANGIOGRAPHY NECK: CPT | Mod: RSC,ME

## 2023-10-31 PROCEDURE — 2500000002 HC RX 250 W HCPCS SELF ADMINISTERED DRUGS (ALT 637 FOR MEDICARE OP, ALT 636 FOR OP/ED)

## 2023-10-31 PROCEDURE — 93010 ELECTROCARDIOGRAM REPORT: CPT | Performed by: INTERNAL MEDICINE

## 2023-10-31 PROCEDURE — 2020000001 HC ICU ROOM DAILY

## 2023-10-31 PROCEDURE — 97162 PT EVAL MOD COMPLEX 30 MIN: CPT | Mod: GP

## 2023-10-31 PROCEDURE — 83519 RIA NONANTIBODY: CPT

## 2023-10-31 PROCEDURE — 85025 COMPLETE CBC W/AUTO DIFF WBC: CPT

## 2023-10-31 RX ORDER — PANTOPRAZOLE SODIUM 40 MG/10ML
40 INJECTION, POWDER, LYOPHILIZED, FOR SOLUTION INTRAVENOUS
Status: DISCONTINUED | OUTPATIENT
Start: 2023-11-01 | End: 2023-11-01

## 2023-10-31 RX ORDER — PANTOPRAZOLE SODIUM 40 MG/1
40 TABLET, DELAYED RELEASE ORAL
Status: DISCONTINUED | OUTPATIENT
Start: 2023-11-01 | End: 2023-11-09 | Stop reason: HOSPADM

## 2023-10-31 RX ORDER — ESOMEPRAZOLE MAGNESIUM 40 MG/1
40 GRANULE, DELAYED RELEASE ORAL
Status: DISCONTINUED | OUTPATIENT
Start: 2023-11-01 | End: 2023-11-01

## 2023-10-31 RX ADMIN — ASPIRIN 81 MG: 81 TABLET, COATED ORAL at 08:25

## 2023-10-31 RX ADMIN — FERROUS SULFATE TAB 325 MG (65 MG ELEMENTAL FE) 325 MG: 325 (65 FE) TAB at 08:26

## 2023-10-31 RX ADMIN — SOTALOL HYDROCHLORIDE 80 MG: 80 TABLET ORAL at 20:03

## 2023-10-31 RX ADMIN — HEPARIN SODIUM 5000 UNITS: 5000 INJECTION INTRAVENOUS; SUBCUTANEOUS at 02:55

## 2023-10-31 RX ADMIN — SOTALOL HYDROCHLORIDE 80 MG: 80 TABLET ORAL at 08:26

## 2023-10-31 RX ADMIN — HEPARIN SODIUM 5000 UNITS: 5000 INJECTION INTRAVENOUS; SUBCUTANEOUS at 10:20

## 2023-10-31 RX ADMIN — CLOPIDOGREL BISULFATE 75 MG: 75 TABLET ORAL at 08:25

## 2023-10-31 RX ADMIN — IOHEXOL 90 ML: 350 INJECTION, SOLUTION INTRAVENOUS at 17:39

## 2023-10-31 RX ADMIN — HEPARIN SODIUM 5000 UNITS: 5000 INJECTION INTRAVENOUS; SUBCUTANEOUS at 17:41

## 2023-10-31 RX ADMIN — DIGOXIN 62.5 MCG: 125 TABLET ORAL at 11:52

## 2023-10-31 RX ADMIN — EZETIMIBE 10 MG: 10 TABLET ORAL at 08:26

## 2023-10-31 SDOH — HEALTH STABILITY: MENTAL HEALTH: HOW OFTEN DO YOU HAVE 6 OR MORE DRINKS ON ONE OCCASION?: NEVER

## 2023-10-31 SDOH — HEALTH STABILITY: MENTAL HEALTH: HOW MANY STANDARD DRINKS CONTAINING ALCOHOL DO YOU HAVE ON A TYPICAL DAY?: PATIENT DOES NOT DRINK

## 2023-10-31 SDOH — HEALTH STABILITY: MENTAL HEALTH: HOW OFTEN DO YOU HAVE A DRINK CONTAINING ALCOHOL?: NEVER

## 2023-10-31 ASSESSMENT — COGNITIVE AND FUNCTIONAL STATUS - GENERAL
DRESSING REGULAR UPPER BODY CLOTHING: A LITTLE
MOBILITY SCORE: 24
MOVING TO AND FROM BED TO CHAIR: A LOT
DAILY ACTIVITIY SCORE: 24
MOBILITY SCORE: 24
DAILY ACTIVITIY SCORE: 15
STANDING UP FROM CHAIR USING ARMS: A LITTLE
WALKING IN HOSPITAL ROOM: A LOT
TURNING FROM BACK TO SIDE WHILE IN FLAT BAD: A LITTLE
MOBILITY SCORE: 14
EATING MEALS: A LITTLE
HELP NEEDED FOR BATHING: A LOT
DRESSING REGULAR LOWER BODY CLOTHING: A LOT
PERSONAL GROOMING: A LITTLE
TOILETING: A LOT
MOVING FROM LYING ON BACK TO SITTING ON SIDE OF FLAT BED WITH BEDRAILS: A LITTLE
PATIENT BASELINE BEDBOUND: NO
CLIMB 3 TO 5 STEPS WITH RAILING: TOTAL

## 2023-10-31 ASSESSMENT — LIFESTYLE VARIABLES
AUDIT-C TOTAL SCORE: 0
SKIP TO QUESTIONS 9-10: 1

## 2023-10-31 ASSESSMENT — PAIN - FUNCTIONAL ASSESSMENT
PAIN_FUNCTIONAL_ASSESSMENT: 0-10

## 2023-10-31 ASSESSMENT — PAIN SCALES - GENERAL
PAINLEVEL_OUTOF10: 0 - NO PAIN

## 2023-10-31 ASSESSMENT — ACTIVITIES OF DAILY LIVING (ADL)
ADL_ASSISTANCE: INDEPENDENT
ADL_ASSISTANCE: INDEPENDENT
LACK_OF_TRANSPORTATION: NO
BATHING_ASSISTANCE: MODERATE

## 2023-10-31 NOTE — PROGRESS NOTES
"   Objective Data:  Last Recorded Vitals:  Vitals:    10/31/23 1000 10/31/23 1100 10/31/23 1200 10/31/23 1300   BP: 125/71  137/64 130/62   Pulse: 74 70 71 79   Resp: (!) 28 17 20 24   Temp:   36 °C (96.8 °F)    TempSrc:   Temporal    SpO2: 100% 100% 100%    Weight:       Height:           Last Labs:  CBC - 10/31/2023:  4:13 AM  9.7 9.2 184    28.2      CMP - 10/31/2023:  4:13 AM  8.9 5.7 22 --- 0.6   3.5 3.3 17 80      PTT - 10/30/2023:  4:16 PM  1.2   13.7 34     No results found for: \"TROPHS\", \"BNP\", \"HGBA1C\", \"LDLCALC\", \"VLDL\"   Last I/O:  I/O last 3 completed shifts:  In: - (0 mL/kg)   Out: 820 (14.5 mL/kg) [Urine:800 (0.4 mL/kg/hr); Blood:20]  Weight: 56.7 kg       Inpatient Medications:  Scheduled medications   Medication Dose Route Frequency    aspirin  81 mg oral Daily    clopidogrel  75 mg oral Daily    digoxin  62.5 mcg oral Daily    [START ON 11/1/2023] pantoprazole  40 mg oral Daily before breakfast    Or    [START ON 11/1/2023] esomeprazole  40 mg nasoduodenal tube Daily before breakfast    Or    [START ON 11/1/2023] pantoprazole  40 mg intravenous Daily before breakfast    ezetimibe  10 mg oral Daily    ferrous sulfate  1 tablet oral Daily with breakfast    heparin (porcine)  5,000 Units subcutaneous q8h    perflutren lipid microspheres  0.5-10 mL of dilution intravenous Once in imaging    perflutren lipid microspheres  0.5-10 mL of dilution intravenous Once in imaging    perflutren lipid microspheres  4 mL of dilution intravenous Once in imaging    perflutren protein A microsphere  0.5 mL intravenous Once in imaging    perflutren protein A microsphere  0.5 mL intravenous Once in imaging    sotalol  80 mg oral BID    sulfur hexafluoride microsphr  2 mL intravenous Once in imaging    sulfur hexafluoride microsphr  2 mL intravenous Once in imaging     PRN medications   Medication    melatonin     Continuous Medications   Medication Dose Last Rate       Assessment/Plan     This is an 87 yo M with a PMHx " significant for a. 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 Umana and Dr. Stern.  Echo with bubbles in the LV during procedure which disappeared when IV medication stopped. Intraop echo showed mild PVL, no PC effusion, and resolution of severe AS.  Confusion and RUE shaking/tremor noted post procedure.  Neurology evaluated patient at 1338.  Initial NIHSS 6 with mild aphasia and inability to hold legs up against gravity.  No definitive evidence of stroke.  BAT called again at 1828 for worsening dysmetria and aphasia.  NIHSS 5.  Neurology recommending brain imaging, lipid panel and HbA1c.     Overnight, confusion and aphasia remained unchanged.  Patient denies SOB, CP, ABD pain and numbness/tingling in BLE.  Neuro assessment significant for expressive aphasia, A+O x 1 (disoriented to place and time), L>R LE strength, and dysmetria noted on finger to nose test.  Patient mildly agitated and restless.  Groin sites without tenderness, no s/s of bleeding/hematoma with c/d/I dressing.  LS CTA.  +2 B/L LE pitting edema.     Structural Heart Plan:  POD # 1 EKG: pending    POD # 1 TTE: pending   CTA today  Continue DAPT with ASA 81 mg PO daily and Plavix 75 mg PO daily; would not recommend triple therapy once Coumadin reinitiated  Restart Coumadin per neuro/stroke for Afib  Neuro/Stroke following; appreciate their recs  CXR pending  Continue care in CICU per primary team    Plan discussed with Dr. Umana. Please contact the Structural Heart team pager with any questions or concerns. #[phone] Monday to Friday business hours; #[phone] nights and weekends (ED).    Maryuri Tripp, MARISSA-CNP, CCRN  Acute Care Nurse Practitioner  Structural Heart/TAVR Team  DocHalo: Maryuri Tripp CNP     Peripheral IV 10/02/23 20 G Right Antecubital (Active)   Site Assessment Clean;Dry;Intact 10/31/23 0800   Dressing Type Transparent  10/31/23 0800   Line Status Flushed 10/31/23 0800   Dressing Status Clean;Dry;Occlusive 10/31/23 0800   Number of days: 29       Peripheral IV 10/30/23 20 G Right;Anterior Forearm (Active)   Site Assessment Clean;Dry;Intact 10/31/23 0800   Dressing Type Transparent 10/31/23 0800   Line Status Flushed 10/31/23 0800   Dressing Status Clean;Dry;Occlusive 10/31/23 0800   Number of days: 1       Code Status:  Full Code    I spent 55 minutes in the professional and overall care of this patient.        Maryuri Tripp, APRN-CNP

## 2023-10-31 NOTE — PROGRESS NOTES
Physical Therapy    Physical Therapy Evaluation & Treatment    Patient Name: Javier Tate  MRN: [id number]  Today's Date: 10/31/2023   Time Calculation  Start Time: 1056  Stop Time: 1122  Time Calculation (min): 26 min    Assessment/Plan   PT Assessment  PT Assessment Results: Decreased strength, Impaired balance, Decreased mobility, Decreased coordination, Decreased cognition, Impaired judgement, Decreased safety awareness, Impaired vision  Rehab Prognosis: Excellent  End of Session Communication: Bedside nurse  End of Session Patient Position: Up in chair, Alarm on  IP OR SWING BED PT PLAN  Inpatient or Swing Bed: Inpatient  PT Plan  Treatment/Interventions: Bed mobility, Transfer training, Gait training, Stair training, Balance training, Neuromuscular re-education, Therapeutic exercise, Therapeutic activity, Home exercise program  PT Plan: Skilled PT  PT Frequency: 4 times per week  PT Discharge Recommendations: High intensity level of continued care  PT Recommended Transfer Status: Assist x1  PT - OK to Discharge: Yes    Subjective     General Visit Information:  General  Reason for Referral: s/p TAVR c/b confusion, dysmetria, and aphasia; multiple BATs called for c/f stroke; neurology following, awaiting imaging, but no immediate plans  Past Medical History Relevant to Rehab: Afib, CAD (7/2023 PCI), HTN, Severe AS  Family/Caregiver Present: Yes  Caregiver Feedback: Wife and daughter present, Wife assisting with answering PLOF/home set-up questions  Co-Treatment: OT  Co-Treatment Reason: to maximize mobility safely given new concern for stroke  Prior to Session Communication: Bedside nurse  Patient Position Received: Up in chair, Alarm on  General Comment: Pt having expressive and receptive deficits decreasing pt's ability to follow commands and comprehend safety concerns for mobilizing independently.    Home Living:  Home Living  Type of Home: House  Lives With: Spouse (wife who is home and available to assist  as needed)  Home Layout: Stairs to alternate level with rails, Two level, Bed/bath upstairs  Alternate Level Stairs-Rails: Both  Alternate Level Stairs-Number of Steps: 14  Home Access: Stairs to enter with rails  Entrance Stairs-Rails: Both  Entrance Stairs-Number of Steps: 2  Bathroom Shower/Tub: Walk-in shower  Bathroom Equipment: Grab bars in shower, Shower chair with back    Prior Level of Function:  Prior Function Per Pt/Caregiver Report  Level of [place]: Independent with ADLs and functional transfers  ADL Assistance: Independent  Homemaking Assistance: Independent  Ambulatory Assistance: Independent  Leisure: (+) drives  Prior Function Comments: Pt denies falls    Precautions:  Precautions  Medical Precautions: Fall precautions, Cardiac precautions    Vital Signs:  Vital Signs  Heart Rate:  (PRE: 71; POST: 89)  SpO2:  (PRE: 100%; post: 100%)  BP:  (PRE: 140/73; POST: 138/76)    Objective     Pain:  Pain Assessment  Pain Assessment: 0-10  Pain Score: 0 - No pain    Cognition:  Cognition  Overall Cognitive Status: Impaired  Orientation Level:  (pt able to state name and year of birthdate, but did not know which month (despite choices given), pt thought he was at his house with choices between house and hospital; Pt thought it was December with choices. Reorientation provided.)  Following Commands:  (pt able to follow one step commands ~25-50% of the time with maximal repetition/demonstration)    General Assessments:      Sensation  Light Touch:  (Unable to assess formally 2/2 impaired command following and receptive/expressive deficits)    Strength  Strength Comments: B LE strength 4+/5 throughout but limited formally 2/2 impaired command following    Coordination  Movements are Fluid and Coordinated: No  Finger to Nose: Impaired  Alternating Toe Taps: Impaired  Heel to Shin: Impaired    Static Sitting Balance  Static Sitting-Balance Support: No upper extremity supported, Feet supported  Static  Sitting-Level of Assistance: Close supervision  Dynamic Sitting Balance  Dynamic Sitting-Balance Support: No upper extremity supported, Feet supported  Dynamic Sitting-Comments: CGA    Static Standing Balance  Static Standing-Balance Support: No upper extremity supported  Static Standing-Level of Assistance: Minimum assistance (x 1 person)  Dynamic Standing Balance  Dynamic Standing-Balance Support: No upper extremity supported  Dynamic Standing-Comments: MinAx2    Functional Assessments:     Transfers  Transfer: Yes  Transfer 1  Transfer From 1: Sit to  Transfer to 1: Stand  Technique 1: Sit to stand  Transfer Level of Assistance 1: Minimum assistance, Minimal verbal cues (x 1 person assist)  Transfers 2  Transfer From 2: Stand to  Transfer to 2: Sit  Technique 2: Stand to sit  Transfer Level of Assistance 2: Minimum assistance, Minimal verbal cues (x 1 person assist)    Ambulation/Gait Training  Ambulation/Gait Training Performed: Yes  Ambulation/Gait Training 1  Surface 1: Level tile  Device 1: No device  Assistance 1: Hand held assistance, Minimum assistance (x 2 person)  Comments/Distance (ft) 1: 15 ft; pt with R lateral lean throughout amb, occasional scissoring gait and mild pathway deviation towards the R    Extremity/Trunk Assessments:  RLE   RLE : Within Functional Limits  LLE   LLE : Within Functional Limits    Treatments:  Therapeutic Activity  Therapeutic Activity Performed: Yes  Therapeutic Activity 1: Pt completed sit<->stand with no AD and minAx1.  Therapeutic Activity 2: Pt ambulated 50 ft with no AD and minAx2 with mild pathway deviation and R lateral lean throughout. Pt with occasional scissoring gait but improved with increased distance.    Outcome Measures:  [facility] Basic Mobility  Turning from your back to your side while in a flat bed without using bedrails: A little  Moving from lying on your back to sitting on the side of a flat bed without using bedrails: A little  Moving to and from bed to  chair (including a wheelchair): A lot  Standing up from a chair using your arms (e.g. wheelchair or bedside chair): A little  To walk in hospital room: A lot  Climbing 3-5 steps with railing: Total  Basic Mobility - Total Score: 14    FSS-ICU  Ambulation: Walks <50 feet with any assistance x1 or walks any distance with assistance x2 people  Rolling: Minimal assistance (performs 75% or more of task)  Sitting: Minimal assistance (performs 75% or more of task)  Transfer Sit-to-Stand: Minimal assistance (performs 75% or more of task)  Transfer Supine-to-Sit: Minimal assistance (performs 75% or more of task)  Total Score: 17     E = Exercise and Early Mobility  Current Activity: Ambulating in richter    Encounter Problems       Encounter Problems (Active)       Balance       Pt will score >24 on Tinetti for low risk of falls. (Progressing)       Start:  10/31/23    Expected End:  11/14/23               Mobility       Patient will ambulate >75 ft with LRAD and supervision for improved functional mobility (Progressing)       Start:  10/31/23    Expected End:  11/14/23               Transfers       Patient to transfer to and from sit to supine independently (Progressing)       Start:  10/31/23    Expected End:  11/14/23            Patient will transfer sit to and from stand with LRAD and supervision for improved functional mobility (Progressing)       Start:  10/31/23    Expected End:  11/14/23                   Education Documentation  Mobility Training, taught by Erin Newby, PT at 10/31/2023  3:53 PM.  Learner: Patient  Readiness: Acceptance  Method: Explanation, Demonstration  Response: Needs Reinforcement    Education Comments  No comments found.    Signed by Erin Newby DPT

## 2023-10-31 NOTE — PROGRESS NOTES
Occupational Therapy    Evaluation/Treatment    Patient Name: Javier Tate  MRN: [id number]  : 1935  Today's Date: 10/31/23  Time Calculation  Start Time: 1054  Stop Time: 1125  Time Calculation (min): 31 min       Assessment:  OT Assessment: Pt requires skilled OT services to address impaired cognition, balance, activity tolerance, strength, mobility, and occupational performance deficits with emphasis on adaptive techniques, compensatory strategies, and environmental modifications in order to facilitate a safe return home.  Prognosis: Good  End of Session Communication: Bedside nurse  End of Session Patient Position: Up in chair, Alarm on  OT Assessment Results: Decreased ADL status, Decreased upper extremity strength, Decreased safe judgment during ADL, Decreased cognition, Decreased endurance, Decreased fine motor control, Decreased functional mobility, Decreased IADLs, Decreased trunk control for functional activities  Prognosis: Good  Plan:  Treatment Interventions: ADL retraining, Functional transfer training, UE strengthening/ROM, Endurance training, Cognitive reorientation, Patient/family training, Neuromuscular reeducation, Fine motor coordination activities, Compensatory technique education, Continued evaluation  OT Frequency: 4 times per week  OT Discharge Recommendations: High intensity level of continued care  OT - OK to Discharge: Yes (once medically appropriate)  Treatment Interventions: ADL retraining, Functional transfer training, UE strengthening/ROM, Endurance training, Cognitive reorientation, Patient/family training, Neuromuscular reeducation, Fine motor coordination activities, Compensatory technique education, Continued evaluation    Subjective   Current Problem:  1. Severe aortic stenosis  Transthoracic Echo (TTE) Complete    Transthoracic Echo (TTE) Complete    CANCELED: Transthoracic Echo (TTE) Limited    CANCELED: Transthoracic Echo (TTE) Limited    CANCELED: Transthoracic Echo  (TTE) Complete    CANCELED: Transthoracic Echo (TTE) Complete      2. Nonrheumatic aortic valve stenosis  Structural heart procedure    Structural heart procedure    Structural heart procedure    Structural heart procedure    Transthoracic Echo (TTE) Limited    Transthoracic Echo (TTE) Limited      3. S/P TAVR (transcatheter aortic valve replacement)  CBC    Basic metabolic panel    Transthoracic Echo (TTE) Complete    Basic metabolic panel    Basic metabolic panel    CBC    CBC    CANCELED: Transthoracic Echo (TTE) Complete    CANCELED: Transthoracic Echo (TTE) Complete    CANCELED: Transthoracic Echo (TTE) Limited    CANCELED: Transthoracic Echo (TTE) Limited      4. S/p TAVR (transcatheter aortic valve replacement), bioprosthetic  Cardiac Device Check - Surgery    Cardiac Device Check - Surgery    Transthoracic Echo (TTE) Complete    Transthoracic Echo (TTE) Complete    CANCELED: Cardiac Device Check - Surgery    CANCELED: Cardiac Device Check - Surgery    CANCELED: Transthoracic Echo (TTE) Complete    CANCELED: Transthoracic Echo (TTE) Complete    CANCELED: Transthoracic Echo (TTE) Complete    CANCELED: Transthoracic Echo (TTE) Complete        General:   OT Received On: 10/31/23  General  Reason for Referral: s/p TAVR c/b confusion, dysmetria, and aphasia; multiple BATs called for c/f stroke; neurology following, awaiting imaging, but no immediate plans  Past Medical History Relevant to Rehab: Afib, CAD (7/2023 PCI), HTN, Severe AS  Family/Caregiver Present: Yes  Caregiver Feedback:  (spouse and daughter present; assisted with PLOF and subjective d/t cognitive and language deficits)  Co-Treatment: PT  Co-Treatment Reason: to maximize pt safety and progress mobility d/t impaired cognition and c/f stroke  Prior to Session Communication: Bedside nurse  Patient Position Received: Up in chair, Alarm on  General Comment: Pt demonstrated impaired cognition with receptive and expressive language deficits. Pt also with  "impaired motor planning, impaired coordination, and decreased command follow.  Precautions:  Medical Precautions: Fall precautions, Cardiac precautions  Vital Signs:  Heart Rate:  (pre 70, post 84)  Heart Rate Source: Monitor  Resp: 14  SpO2:  (pre 100, post 100)  BP:  (pre 125/71, post 138/76)  Pain:  Pain Assessment  Pain Assessment: 0-10  Pain Score: 0 - No pain    Objective   Cognition:  Overall Cognitive Status: Impaired  Arousal/Alertness: Inconsistent responses to stimuli  Orientation Level:  (pt oriented to self by name and year of birth but unable to state day/month, negative to time with choices, negative to place with choices; reorientation provided)  Following Commands:  (pt followed ~25-50% of simple one-step commands with maximal verbal, tactile, and visual cues)  Cognition Comments: Pt with inconsistent ability to identify objects and colors with ~50% accuracy. Pt sometimes accurate with conversation but perseverating on previous topics, for example asked to ID watch then perseverated on \"apple\" for iwatch and \"band\" for watch band when not discussing topic. Pt expressed frustration with word finding difficulties \"it was all at once.\" Pt provided reassurance and education with encouragement to continue with attempts at speech. OT requested SLP consult to stroke team.  Attention:  (mild impairment)  Memory: Unable to assess  Safety/Judgement: Exceptions to WFL  Insight: Moderate  Impulsive: Moderately  Task Initiation: Initiates with cues  Processing Speed: Delayed     Confusion Assessment Method (CAM)  Acute Onset and Fluctuating Course (1A): Yes  Acute Onset and Fluctuating Course (1B): Yes  Inattention (2): Yes  Disorganized Thinking (3): Yes  Rate Patient's Level of Consciousness (4): Alert (Normal), No  Delirium Present: Yes  Card Agitation Sedation Scale  Card Agitation Sedation Scale (RASS): Alert and calm  Home Living:  Type of Home: House  Lives With: Spouse (able to assist prn)  Home " Layout: Stairs to alternate level with rails, Two level, Bed/bath upstairs  Alternate Level Stairs-Rails: Both  Alternate Level Stairs-Number of Steps: 14  Home Access: Stairs to enter with rails  Entrance Stairs-Rails: Both  Entrance Stairs-Number of Steps: 2  Bathroom Shower/Tub: Walk-in shower  Bathroom Equipment: Grab bars in shower, Shower chair with back  Prior Function:  Level of Real: Independent with ADLs and functional transfers  Receives Help From: Family  ADL Assistance: Independent  Homemaking Assistance: Independent  Ambulatory Assistance: Independent  Leisure: yardwork  Prior Function Comments: +drive, denies falls    ADL:  Eating Assistance: Minimal  Eating Deficit: Setup, Verbal cueing, Increased time to complete  Grooming Assistance: Minimal  Grooming Deficit: Setup, Verbal cueing, Increased time to complete  Bathing Assistance: Moderate  Bathing Deficit: Setup, Verbal cueing, Increased time to complete   UE Dressing Assistance: Minimal  UE Dressing Deficit: Setup, Verbal cueing, Increased time to complete  LE Dressing Assistance: Moderate  LE Dressing Deficit: Setup, Verbal cueing, Increased time to complete  Toileting Assistance with Device: Moderate  Toileting Deficit: Setup, Verbal cueing, Increased time to complete  ADL Comments: anticipated level of performance above  Activities of Daily Living:    LE Dressing  LE Dressing: Yes  Pants Level of Assistance: Moderate assistance, Setup, Moderate verbal cues, Tactile cues  LE Dressing Where Assessed:  (seated in chair to thread and stood to hike over hips)  LE Dressing Comments: Pt required setup with maximal cues for initiation of task with Min A x1 to thread distally d/t observed difficulty manipulating fabric and in standing with difficulty detecting grasp of pants with increased to assist to complete task.     Activity Tolerance:  Early Mobility/Exercise Safety Screen:  (ICU Mobility Scale: 7)  Activity Tolerance Comments: Pt with good  tolerance during mobility and activity.     Bed Mobility/Transfers:   Bed Mobility  Bed Mobility: No (pt seated OOB pre/post session)    Transfers  Transfer: Yes  Transfer 1  Transfer From 1: Sit to  Transfer to 1: Stand  Technique 1: Sit to stand  Transfer Level of Assistance 1: Minimum assistance, Moderate verbal cues, Moderate tactile cues (x1 assist)  Trials/Comments 1: cues for direction follow and sequencing  Transfers 2  Transfer From 2: Stand to  Transfer to 2: Sit  Technique 2: Stand to sit  Transfer Level of Assistance 2: Minimum assistance, Minimal verbal cues (x1 assist)    Therapy/Activity:    Therapeutic Activity  Therapeutic Activity Performed: Yes (TREATMENT)  Therapeutic Activity 1: Pt completed sit<>stand transfers from chair with Min A x1 and maximal cues for technique and direction follow.  Therapeutic Activity 2: Pt completed household distance functional mobility with Min A x2 and maximal cues for safety, direciton follow, and technique doubling distance between two trials. Pt observed with narrowed steps and unsteadiness but no LOB, close guarding for safety.    Vision:  Vision - Basic Assessment  Current Vision: Wears glasses all the time (observed spontaneous crossing of midline, difficult to formally assess d/t language deficits and command follow)  Sensation:  Light Touch:  (difficult to assess d/t impaired command follow and language deficits)  Strength:  Strength Comments: B UE/LE grossly WFL observed, questionably decreased LUE weakness vs R but difficult to formally assess (no tone noted however with fluctuating R vs L flexor tone positioning observed of BUE with occurance in R > L)    Coordination:  Movements are Fluid and Coordinated: No  Finger to Nose: Impaired  Alternating Toe Taps: Impaired  Heel to Shin: Impaired  Finger to Target: Impaired  Coordination Comment: pt with decreased motor planning and sequencing of movements; activated other limbs when attempting to isolate for  assessment   Hand Function:  Hand Function  Gross Grasp: Functional  Coordination: Impaired  Extremities:   RUE   RUE : Within Functional Limits  LUE   LUE: Within Functional Limits  RLE   RLE : Within Functional Limits  LLE   LLE : Within Functional Limits    Outcome Measures:   [facility] Daily Activity  Putting on and taking off regular lower body clothing: A lot  Bathing (including washing, rinsing, drying): A lot  Putting on and taking off regular upper body clothing: A little  Toileting, which includes using toilet, bedpan or urinal: A lot  Taking care of personal grooming such as brushing teeth: A little  Eating Meals: A little  Daily Activity - Total Score: 15    ICU Mobility Screen  Early Mobility/Exercise Safety Screen:  (ICU Mobility Scale: 7)    Education Documentation  Body Mechanics, taught by Milana Sharma OT at 10/31/2023  4:27 PM.  Learner: Family, Patient  Readiness: Acceptance  Method: Demonstration, Explanation  Response: Needs Reinforcement    Precautions, taught by Milana Sharma OT at 10/31/2023  4:27 PM.  Learner: Family, Patient  Readiness: Acceptance  Method: Demonstration, Explanation  Response: Needs Reinforcement    ADL Training, taught by Milana Sharma OT at 10/31/2023  4:27 PM.  Learner: Family, Patient  Readiness: Acceptance  Method: Demonstration, Explanation  Response: Needs Reinforcement    Education Comments  No comments found.      Goals:  Encounter Problems       Encounter Problems (Active)       ADLs       Pt will perform grooming task in supported sitting with Supevision and minimal verbal cues for safety. (Progressing)       Start:  10/31/23    Expected End:  11/21/23            Pt will perform UB dressing with Supervision and minimal verbal cues for safety in supported sitting.  (Progressing)       Start:  10/31/23    Expected End:  11/21/23            Pt will perform LB dressing with Supervision and minimal verbal cues for safety. (Progressing)       Start:  10/31/23     Expected End:  11/21/23                 COGNITION/SAFETY       Pt will complete cognitive assessment while maintaining attention to task >8 minutes for increased ADL engagement. (Progressing)       Start:  10/31/23    Expected End:  11/21/23            Pt will follow >90% of simple one-step commands with minimal verbal cues for accuracy.  (Progressing)       Start:  10/31/23    Expected End:  11/21/23               MOBILITY       Pt will complete household distance functional mobility with Supervision using LRD and minimal verbal cues for safety. (Progressing)       Start:  10/31/23    Expected End:  11/21/23                 TRANSFERS       Pt will complete supine<>sit transfers with Supervision and minimal verbal cues for safety. (Progressing)       Start:  10/31/23    Expected End:  11/21/23            Pt will complete sit<>stand transfers with Supervision using LRD and minimal verbal cues for safety. (Progressing)       Start:  10/31/23    Expected End:  11/21/23

## 2023-10-31 NOTE — PROGRESS NOTES
"Javier Tate is a 88 y.o. male on day 1 of admission presenting with Nonrheumatic aortic valve stenosis.    Subjective   Says he feels fine and that there are no problems.    Objective     Physical Exam  General: lying flat in bed. Sleeping. Non-toxic appearing. A+Ox2 to person and place  Neuro: CNII-XII grossly intact. Finger-nose test with some evidence of abnormalities; intermittently is normal. 5/5 strength throughout. RLE with decreased sensation on anterior shin. Some evidence of expressive vs. receptive aphasia, stable.  Neck: No JVD  ENT: Moist Mucous Membranes  CV: RRR. No murmurs. No rubs.  Resp: CTAB  Abd: NT ND  : no masses at either groin  Ext: full ROM. No swelling.  Skin: No rash  Last Recorded Vitals  Blood pressure 108/67, pulse 79, temperature 36.4 °C (97.5 °F), resp. rate 26, height 1.702 m (5' 7\"), weight 56.7 kg (125 lb), SpO2 97 %.  Intake/Output last 3 Shifts:  I/O last 3 completed shifts:  In: - (0 mL/kg)   Out: 820 (14.5 mL/kg) [Urine:800 (0.4 mL/kg/hr); Blood:20]  Weight: 56.7 kg     Relevant Results    Assessment/Plan   88M w/ severe aortic stenosis s/p 10/30 TAVR p/w disorientation, expressive aphasia, receptive aphasia. 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 Toxic-metabolic encephalopathy and digoxin toxicity remains on the differential.    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  - ASA 81mg PO every day  - Clopidogrel 75mg PO every day  - digoxin 62.5mcg PO every day  - sotalol 80mg PO BID  [ ] holding warfarin " d/t potential hemorrhagic conversion of AC if ischemic CVA is present  [ ] followup INR     #HTN  [ ] resume lisinopril 2.5mg PO every day on 10/31 (holding iso soft BPs)     Resp:  - no acute issues     GI:  #GI ppx  - pantoprazole 40mg PO every day     Renal/:  - no acute issues     ID:  - no acute issues     O2: ORA  Pressors: none  F: none  E: none  N: Cardiac Diet  A: PIV  DVT ppx: heparin 5000U subcutaneous q8hr until AC resumption     Randy Mackay MD

## 2023-10-31 NOTE — CARE PLAN
Problem: Pain - Adult  Goal: Verbalizes/displays adequate comfort level or baseline comfort level  Outcome: Progressing     Problem: Safety - Adult  Goal: Free from fall injury  Outcome: Progressing     Problem: Discharge Planning  Goal: Discharge to home or other facility with appropriate resources  Outcome: Progressing     Problem: Chronic Conditions and Co-morbidities  Goal: Patient's chronic conditions and co-morbidity symptoms are monitored and maintained or improved  Outcome: Progressing     Problem: Skin  Goal: Decreased wound size/increased tissue granulation at next dressing change  Outcome: Progressing  Goal: Participates in plan/prevention/treatment measures  Outcome: Progressing  Goal: Prevent/manage excess moisture  Outcome: Progressing  Goal: Prevent/minimize sheer/friction injuries  Outcome: Progressing  Goal: Promote/optimize nutrition  Outcome: Progressing  Goal: Promote skin healing  Outcome: Progressing     Problem: Fall/Injury  Goal: Not fall by end of shift  Outcome: Progressing  Goal: Be free from injury by end of the shift  Outcome: Progressing  Goal: Verbalize understanding of personal risk factors for fall in the hospital  Outcome: Progressing  Goal: Verbalize understanding of risk factor reduction measures to prevent injury from fall in the home  Outcome: Progressing  Goal: Use assistive devices by end of the shift  Outcome: Progressing  Goal: Pace activities to prevent fatigue by end of the shift  Outcome: Progressing        post-procedure radiography performed

## 2023-11-01 ENCOUNTER — APPOINTMENT (OUTPATIENT)
Dept: CARDIOLOGY | Facility: HOSPITAL | Age: 88
DRG: 266 | End: 2023-11-01
Payer: MEDICARE

## 2023-11-01 LAB
ALBUMIN SERPL BCP-MCNC: 3 G/DL (ref 3.4–5)
ALBUMIN SERPL BCP-MCNC: 3.3 G/DL (ref 3.4–5)
ANION GAP SERPL CALC-SCNC: 13 MMOL/L (ref 10–20)
ANION GAP SERPL CALC-SCNC: 9 MMOL/L (ref 10–20)
BASOPHILS # BLD AUTO: 0.05 X10*3/UL (ref 0–0.1)
BASOPHILS NFR BLD AUTO: 0.5 %
BUN SERPL-MCNC: 22 MG/DL (ref 6–23)
BUN SERPL-MCNC: 23 MG/DL (ref 6–23)
CALCIUM SERPL-MCNC: 8.7 MG/DL (ref 8.6–10.6)
CALCIUM SERPL-MCNC: 9 MG/DL (ref 8.6–10.6)
CHLORIDE SERPL-SCNC: 104 MMOL/L (ref 98–107)
CHLORIDE SERPL-SCNC: 106 MMOL/L (ref 98–107)
CO2 SERPL-SCNC: 28 MMOL/L (ref 21–32)
CO2 SERPL-SCNC: 29 MMOL/L (ref 21–32)
CREAT SERPL-MCNC: 1.01 MG/DL (ref 0.5–1.3)
CREAT SERPL-MCNC: 1.36 MG/DL (ref 0.5–1.3)
EOSINOPHIL # BLD AUTO: 0.38 X10*3/UL (ref 0–0.4)
EOSINOPHIL NFR BLD AUTO: 4.1 %
ERYTHROCYTE [DISTWIDTH] IN BLOOD BY AUTOMATED COUNT: 13.3 % (ref 11.5–14.5)
ERYTHROCYTE [DISTWIDTH] IN BLOOD BY AUTOMATED COUNT: 13.7 % (ref 11.5–14.5)
FOLATE SERPL-MCNC: 23.1 NG/ML
GFR SERPL CREATININE-BSD FRML MDRD: 51 ML/MIN/1.73M*2
GFR SERPL CREATININE-BSD FRML MDRD: 72 ML/MIN/1.73M*2
GLUCOSE BLD MANUAL STRIP-MCNC: 138 MG/DL (ref 74–99)
GLUCOSE SERPL-MCNC: 120 MG/DL (ref 74–99)
GLUCOSE SERPL-MCNC: 126 MG/DL (ref 74–99)
HCT VFR BLD AUTO: 26.9 % (ref 41–52)
HCT VFR BLD AUTO: 28 % (ref 41–52)
HGB BLD-MCNC: 8.6 G/DL (ref 13.5–17.5)
HGB BLD-MCNC: 8.7 G/DL (ref 13.5–17.5)
IMM GRANULOCYTES # BLD AUTO: 0.04 X10*3/UL (ref 0–0.5)
IMM GRANULOCYTES NFR BLD AUTO: 0.4 % (ref 0–0.9)
INR PPP: 1.2 (ref 0.9–1.1)
LYMPHOCYTES # BLD AUTO: 0.85 X10*3/UL (ref 0.8–3)
LYMPHOCYTES NFR BLD AUTO: 9.3 %
MAGNESIUM SERPL-MCNC: 2.15 MG/DL (ref 1.6–2.4)
MAGNESIUM SERPL-MCNC: 2.17 MG/DL (ref 1.6–2.4)
MCH RBC QN AUTO: 31.1 PG (ref 26–34)
MCH RBC QN AUTO: 31.3 PG (ref 26–34)
MCHC RBC AUTO-ENTMCNC: 30.7 G/DL (ref 32–36)
MCHC RBC AUTO-ENTMCNC: 32.3 G/DL (ref 32–36)
MCV RBC AUTO: 102 FL (ref 80–100)
MCV RBC AUTO: 96 FL (ref 80–100)
MONOCYTES # BLD AUTO: 1.52 X10*3/UL (ref 0.05–0.8)
MONOCYTES NFR BLD AUTO: 16.6 %
NEUTROPHILS # BLD AUTO: 6.33 X10*3/UL (ref 1.6–5.5)
NEUTROPHILS NFR BLD AUTO: 69.1 %
NRBC BLD-RTO: 0 /100 WBCS (ref 0–0)
NRBC BLD-RTO: 0 /100 WBCS (ref 0–0)
PHOSPHATE SERPL-MCNC: 3 MG/DL (ref 2.5–4.9)
PHOSPHATE SERPL-MCNC: 3 MG/DL (ref 2.5–4.9)
PLATELET # BLD AUTO: 142 X10*3/UL (ref 150–450)
PLATELET # BLD AUTO: 149 X10*3/UL (ref 150–450)
PMV BLD AUTO: 9.9 FL (ref 7.5–11.5)
POTASSIUM SERPL-SCNC: 3.6 MMOL/L (ref 3.5–5.3)
POTASSIUM SERPL-SCNC: 4 MMOL/L (ref 3.5–5.3)
PROTHROMBIN TIME: 13.9 SECONDS (ref 9.8–12.8)
RBC # BLD AUTO: 2.75 X10*6/UL (ref 4.5–5.9)
RBC # BLD AUTO: 2.8 X10*6/UL (ref 4.5–5.9)
SODIUM SERPL-SCNC: 140 MMOL/L (ref 136–145)
SODIUM SERPL-SCNC: 141 MMOL/L (ref 136–145)
VIT B12 SERPL-MCNC: 611 PG/ML (ref 211–911)
WBC # BLD AUTO: 9 X10*3/UL (ref 4.4–11.3)
WBC # BLD AUTO: 9.2 X10*3/UL (ref 4.4–11.3)

## 2023-11-01 PROCEDURE — 97116 GAIT TRAINING THERAPY: CPT | Mod: GP | Performed by: PHYSICAL THERAPIST

## 2023-11-01 PROCEDURE — 80069 RENAL FUNCTION PANEL: CPT

## 2023-11-01 PROCEDURE — 36415 COLL VENOUS BLD VENIPUNCTURE: CPT

## 2023-11-01 PROCEDURE — 1200000002 HC GENERAL ROOM WITH TELEMETRY DAILY

## 2023-11-01 PROCEDURE — 85025 COMPLETE CBC W/AUTO DIFF WBC: CPT

## 2023-11-01 PROCEDURE — 2500000001 HC RX 250 WO HCPCS SELF ADMINISTERED DRUGS (ALT 637 FOR MEDICARE OP)

## 2023-11-01 PROCEDURE — 85610 PROTHROMBIN TIME: CPT

## 2023-11-01 PROCEDURE — 82746 ASSAY OF FOLIC ACID SERUM: CPT

## 2023-11-01 PROCEDURE — 96372 THER/PROPH/DIAG INJ SC/IM: CPT

## 2023-11-01 PROCEDURE — 85027 COMPLETE CBC AUTOMATED: CPT

## 2023-11-01 PROCEDURE — 82607 VITAMIN B-12: CPT

## 2023-11-01 PROCEDURE — 97530 THERAPEUTIC ACTIVITIES: CPT | Mod: GP | Performed by: PHYSICAL THERAPIST

## 2023-11-01 PROCEDURE — 2500000004 HC RX 250 GENERAL PHARMACY W/ HCPCS (ALT 636 FOR OP/ED)

## 2023-11-01 PROCEDURE — 99233 SBSQ HOSP IP/OBS HIGH 50: CPT

## 2023-11-01 PROCEDURE — 83735 ASSAY OF MAGNESIUM: CPT

## 2023-11-01 PROCEDURE — 82947 ASSAY GLUCOSE BLOOD QUANT: CPT

## 2023-11-01 PROCEDURE — 2500000002 HC RX 250 W HCPCS SELF ADMINISTERED DRUGS (ALT 637 FOR MEDICARE OP, ALT 636 FOR OP/ED)

## 2023-11-01 PROCEDURE — 2500000001 HC RX 250 WO HCPCS SELF ADMINISTERED DRUGS (ALT 637 FOR MEDICARE OP): Performed by: NURSE PRACTITIONER

## 2023-11-01 RX ADMIN — HEPARIN SODIUM 5000 UNITS: 5000 INJECTION INTRAVENOUS; SUBCUTANEOUS at 18:33

## 2023-11-01 RX ADMIN — FERROUS SULFATE TAB 325 MG (65 MG ELEMENTAL FE) 325 MG: 325 (65 FE) TAB at 09:19

## 2023-11-01 RX ADMIN — HEPARIN SODIUM 5000 UNITS: 5000 INJECTION INTRAVENOUS; SUBCUTANEOUS at 09:15

## 2023-11-01 RX ADMIN — CLOPIDOGREL BISULFATE 75 MG: 75 TABLET ORAL at 09:15

## 2023-11-01 RX ADMIN — SOTALOL HYDROCHLORIDE 80 MG: 80 TABLET ORAL at 09:19

## 2023-11-01 RX ADMIN — EZETIMIBE 10 MG: 10 TABLET ORAL at 09:15

## 2023-11-01 RX ADMIN — PANTOPRAZOLE SODIUM 40 MG: 40 TABLET, DELAYED RELEASE ORAL at 09:14

## 2023-11-01 RX ADMIN — ASPIRIN 81 MG: 81 TABLET, COATED ORAL at 09:15

## 2023-11-01 RX ADMIN — DIGOXIN 62.5 MCG: 125 TABLET ORAL at 13:18

## 2023-11-01 RX ADMIN — SOTALOL HYDROCHLORIDE 80 MG: 80 TABLET ORAL at 22:26

## 2023-11-01 RX ADMIN — HEPARIN SODIUM 5000 UNITS: 5000 INJECTION INTRAVENOUS; SUBCUTANEOUS at 03:46

## 2023-11-01 SDOH — SOCIAL STABILITY: SOCIAL INSECURITY: WITHIN THE LAST YEAR, HAVE YOU BEEN AFRAID OF YOUR PARTNER OR EX-PARTNER?: NO

## 2023-11-01 SDOH — SOCIAL STABILITY: SOCIAL INSECURITY
WITHIN THE LAST YEAR, HAVE TO BEEN RAPED OR FORCED TO HAVE ANY KIND OF SEXUAL ACTIVITY BY YOUR PARTNER OR EX-PARTNER?: NO

## 2023-11-01 SDOH — SOCIAL STABILITY: SOCIAL INSECURITY
WITHIN THE LAST YEAR, HAVE YOU BEEN KICKED, HIT, SLAPPED, OR OTHERWISE PHYSICALLY HURT BY YOUR PARTNER OR EX-PARTNER?: NO

## 2023-11-01 SDOH — SOCIAL STABILITY: SOCIAL NETWORK: ARE YOU MARRIED, WIDOWED, DIVORCED, SEPARATED, NEVER MARRIED, OR LIVING WITH A PARTNER?: MARRIED

## 2023-11-01 SDOH — ECONOMIC STABILITY: FOOD INSECURITY: WITHIN THE PAST 12 MONTHS, YOU WORRIED THAT YOUR FOOD WOULD RUN OUT BEFORE YOU GOT MONEY TO BUY MORE.: NEVER TRUE

## 2023-11-01 SDOH — SOCIAL STABILITY: SOCIAL INSECURITY: WITHIN THE LAST YEAR, HAVE YOU BEEN HUMILIATED OR EMOTIONALLY ABUSED IN OTHER WAYS BY YOUR PARTNER OR EX-PARTNER?: NO

## 2023-11-01 SDOH — ECONOMIC STABILITY: HOUSING INSECURITY
IN THE LAST 12 MONTHS, WAS THERE A TIME WHEN YOU DID NOT HAVE A STEADY PLACE TO SLEEP OR SLEPT IN A SHELTER (INCLUDING NOW)?: NO

## 2023-11-01 SDOH — ECONOMIC STABILITY: INCOME INSECURITY: IN THE PAST 12 MONTHS, HAS THE ELECTRIC, GAS, OIL, OR WATER COMPANY THREATENED TO SHUT OFF SERVICE IN YOUR HOME?: NO

## 2023-11-01 SDOH — ECONOMIC STABILITY: TRANSPORTATION INSECURITY
IN THE PAST 12 MONTHS, HAS LACK OF TRANSPORTATION KEPT YOU FROM MEETINGS, WORK, OR FROM GETTING THINGS NEEDED FOR DAILY LIVING?: NO

## 2023-11-01 SDOH — ECONOMIC STABILITY: FOOD INSECURITY: WITHIN THE PAST 12 MONTHS, THE FOOD YOU BOUGHT JUST DIDN'T LAST AND YOU DIDN'T HAVE MONEY TO GET MORE.: NEVER TRUE

## 2023-11-01 SDOH — ECONOMIC STABILITY: HOUSING INSECURITY: IN THE LAST 12 MONTHS, HOW MANY PLACES HAVE YOU LIVED?: 1

## 2023-11-01 SDOH — ECONOMIC STABILITY: INCOME INSECURITY: HOW HARD IS IT FOR YOU TO PAY FOR THE VERY BASICS LIKE FOOD, HOUSING, MEDICAL CARE, AND HEATING?: NOT HARD AT ALL

## 2023-11-01 SDOH — ECONOMIC STABILITY: TRANSPORTATION INSECURITY
IN THE PAST 12 MONTHS, HAS THE LACK OF TRANSPORTATION KEPT YOU FROM MEDICAL APPOINTMENTS OR FROM GETTING MEDICATIONS?: NO

## 2023-11-01 SDOH — ECONOMIC STABILITY: INCOME INSECURITY: IN THE LAST 12 MONTHS, WAS THERE A TIME WHEN YOU WERE NOT ABLE TO PAY THE MORTGAGE OR RENT ON TIME?: NO

## 2023-11-01 ASSESSMENT — PAIN - FUNCTIONAL ASSESSMENT
PAIN_FUNCTIONAL_ASSESSMENT: 0-10

## 2023-11-01 ASSESSMENT — PAIN SCALES - GENERAL
PAINLEVEL_OUTOF10: 0 - NO PAIN

## 2023-11-01 ASSESSMENT — COGNITIVE AND FUNCTIONAL STATUS - GENERAL
CLIMB 3 TO 5 STEPS WITH RAILING: A LOT
MOVING FROM LYING ON BACK TO SITTING ON SIDE OF FLAT BED WITH BEDRAILS: A LITTLE
STANDING UP FROM CHAIR USING ARMS: A LITTLE
TURNING FROM BACK TO SIDE WHILE IN FLAT BAD: A LITTLE
WALKING IN HOSPITAL ROOM: A LITTLE
MOBILITY SCORE: 17
MOVING TO AND FROM BED TO CHAIR: A LITTLE

## 2023-11-01 NOTE — PROGRESS NOTES
Physical Therapy    Physical Therapy Treatment    Patient Name: Javier Tate  MRN: [id number]  Today's Date: 11/1/2023  Time Calculation  Start Time: 0942  Stop Time: 1005  Time Calculation (min): 23 min       Assessment/Plan   PT Assessment  PT Assessment Results: Decreased strength, Impaired balance, Decreased mobility, Decreased coordination, Decreased cognition, Impaired judgement, Decreased safety awareness  Rehab Prognosis: Excellent  Evaluation/Treatment Tolerance: Patient tolerated treatment well  End of Session Communication: Bedside nurse  Assessment Comment: Pt participated in abulation and transfer training. Pt required consistent cuing throughout session for safety, continues to demonstrate impaired dynamic balance. it is recommended that the patient continue to receive skilled physical therapy to improve overall functional mobility.  End of Session Patient Position: Up in chair, Alarm off, caregiver present  PT Plan  Inpatient/Swing Bed or Outpatient: Inpatient  PT Plan  Treatment/Interventions: Bed mobility, Transfer training, Gait training, Stair training, Balance training, Neuromuscular re-education, Strengthening, Endurance training, Therapeutic exercise, Therapeutic activity, Home exercise program, Positioning, Postural re-education  PT Plan: Skilled PT  PT Frequency: 4 times per week  PT Discharge Recommendations: High intensity level of continued care  PT Recommended Transfer Status: Assist x1  PT - OK to Discharge: Yes      General Visit Information:   PT  Visit  PT Received On: 11/01/23  Response to Previous Treatment: Patient with no complaints from previous session.  General  Prior to Session Communication: Bedside nurse  Patient Position Received: Bed, 3 rail up, Alarm on  General Comment: Pt supine in bed and agreeable to therapy. Sitter present at bedside.    Subjective   Precautions:  Precautions  Medical Precautions: Fall precautions, Cardiac precautions  Vital Signs:  Vital  Signs  Heart Rate: 71 (post 74)  SpO2: 98 % (post 98)  BP: 127/71 (post: 140/73)    Objective   Pain:  Pain Assessment  Pain Assessment: 0-10  Pain Score: 0 - No pain  Cognition:  Cognition  Safety/Judgement: Exceptions to WFL  Insight: Moderate  Impulsive: Moderately    Activity Tolerance:  Activity Tolerance  Early Mobility/Exercise Safety Screen: Proceed with mobilization - No exclusion criteria met  Treatments:       Bed Mobility  Bed Mobility: Yes  Bed Mobility 1  Bed Mobility 1: Supine to sitting  Level of Assistance 1: Minimum assistance  Bed Mobility Comments 1: Pt given cues for sequencing and safety.    Ambulation/Gait Training  Ambulation/Gait Training Performed: Yes  Ambulation/Gait Training 1  Surface 1: Level tile  Device 1: Rolling walker  Assistance 1: Minimum assistance  Comments/Distance (ft) 1: 8 feet within room, 3 feet. Pt slightly impuslvie and requried several repetition of verbal cues before initiation of task.  Transfers  Transfer: Yes  Transfer 1  Transfer From 1: Sit to  Transfer to 1: Stand  Technique 1: Sit to stand  Transfer Level of Assistance 1: Minimum assistance  Transfers 2  Transfer From 2: Sit to, Stand to  Transfer to 2: Stand, Sit  Transfer Level of Assistance 2: Minimal tactile cues, Moderate verbal cues, Minimum assistance  Trials/Comments 2: Pt given cues for sequencing    Outcome Measures:  [facility] Basic Mobility  Turning from your back to your side while in a flat bed without using bedrails: A little  Moving from lying on your back to sitting on the side of a flat bed without using bedrails: A little  Moving to and from bed to chair (including a wheelchair): A little  Standing up from a chair using your arms (e.g. wheelchair or bedside chair): A little  To walk in hospital room: A little  Climbing 3-5 steps with railing: A lot  Basic Mobility - Total Score: 17    Education Documentation  Mobility Training, taught by Amber Schultz, PT at 11/1/2023  1:30 PM.  Learner:  Patient  Readiness: Acceptance  Method: Explanation  Response: Verbalizes Understanding    Education Comments  No comments found.        OP EDUCATION:       Encounter Problems       Encounter Problems (Active)       Balance       Pt will score >24 on Tinetti for low risk of falls. (Progressing)       Start:  10/31/23    Expected End:  11/14/23               Mobility       Patient will ambulate >75 ft with LRAD and supervision for improved functional mobility (Progressing)       Start:  10/31/23    Expected End:  11/14/23               Pain - Adult          Transfers       Patient to transfer to and from sit to supine independently (Progressing)       Start:  10/31/23    Expected End:  11/14/23            Patient will transfer sit to and from stand with LRAD and supervision for improved functional mobility (Progressing)       Start:  10/31/23    Expected End:  11/14/23

## 2023-11-01 NOTE — CARE PLAN
The patient's goals for the shift include  remain free from injury throughout the shift.    The clinical goals for the shift include no falls by end of shift

## 2023-11-01 NOTE — PROGRESS NOTES
Sw reviewed the SDOH and the Social Work discharge planning assessment that was completed by the other staff the SDOH was incomplete. I met with the patient with his wife present to complete the SDOH. Per the assessment there are no SDOH that were identified. The patient nor his wife had any SW concerns at this time.  Sw will continue to follow for discharge planning needs.

## 2023-11-01 NOTE — PROGRESS NOTES
"Spiritual Care Visit     introduced herself to patient and family who are bedside.  received a referral from staff member.  asked some simple questions and provided support and comfort. Pt's wife shared that her  heart surgery went well but they have some \"other concerns\". She was tearful. Daughter bedside as well. Spouse also shared that patient is [employer]ism. I offered the services of Father Riccardo, and they said they would \"like that\". Chaplaincy will continue to follow.                                                        "

## 2023-11-01 NOTE — CARE PLAN
Problem: Pain - Adult  Goal: Verbalizes/displays adequate comfort level or baseline comfort level  Outcome: Progressing     Problem: Safety - Adult  Goal: Free from fall injury  Outcome: Progressing     Problem: Discharge Planning  Goal: Discharge to home or other facility with appropriate resources  Outcome: Progressing     Problem: Chronic Conditions and Co-morbidities  Goal: Patient's chronic conditions and co-morbidity symptoms are monitored and maintained or improved  Outcome: Progressing     Problem: Skin  Goal: Decreased wound size/increased tissue granulation at next dressing change  Outcome: Progressing  Goal: Participates in plan/prevention/treatment measures  Outcome: Progressing  Goal: Prevent/manage excess moisture  Outcome: Progressing  Goal: Prevent/minimize sheer/friction injuries  Outcome: Progressing  Goal: Promote/optimize nutrition  Outcome: Progressing  Goal: Promote skin healing  Outcome: Progressing     Problem: Fall/Injury  Goal: Not fall by end of shift  Outcome: Progressing  Goal: Be free from injury by end of the shift  Outcome: Progressing  Goal: Verbalize understanding of personal risk factors for fall in the hospital  Outcome: Progressing  Goal: Verbalize understanding of risk factor reduction measures to prevent injury from fall in the home  Outcome: Progressing  Goal: Use assistive devices by end of the shift  Outcome: Progressing  Goal: Pace activities to prevent fatigue by end of the shift  Outcome: Progressing   The patient's goals for the shift include      The clinical goals for the shift include Pt will remain free from injury throughout shift

## 2023-11-01 NOTE — SIGNIFICANT EVENT
Patient seen upon transfer to [facility] [facility]. He is expressively aphasic, orientation difficult to determine. He nods and shakes head to questions and follows commands. Regular rate and rhythm, V paced on tele. Posterior lungs with BL rales. Groin sites intact, no oozing or hematoma. Transfer orders reviewed, questions answered at the bedside for nursing and family. Vital signs stable, suitable for floor.    To be discussed with attending in the AM.

## 2023-11-01 NOTE — SIGNIFICANT EVENT
Stroke Team Updated Recommendations    Patient still had some word finding difficulty.  CT head showed new left parietal infarction. CTA showed L ICA stenosis <20%, R ICA 60% and irregular basilar artery (asymptomatic).    Recommendations:  -Dual antiplatelet for 3 months, then aspirin monotherapy.  -Please order vascular neurology referral at discharge 3 months.    Stroke team will sign off, please feel free to reach out if there is any concern.  Case was discussed with Dr. Victoria.    Gila Singleton MD (Paul)  Neurology Resident, PGY3  Pager [phone]

## 2023-11-01 NOTE — PROGRESS NOTES
Javier Tate is a 88 y.o. male on day 2 of admission presenting with Nonrheumatic aortic valve stenosis.    Subjective   NAEO. Patient denying any complaints this morning. Still has persistent word finding difficulty and expressive aphasia.      Objective   Vitals 24 hour ranges:  Heart Rate:  []   Temp:  [36.2 °C (97.2 °F)-37 °C (98.6 °F)]   Resp:  [14-29]   BP: ()/(51-79)   SpO2:  [75 %-100 %]     Physical Exam:  Physical Exam  HENT:      Head: Normocephalic.   Eyes:      Extraocular Movements: Extraocular movements intact.      Pupils: Pupils are equal, round, and reactive to light.   Cardiovascular:      Rate and Rhythm: Normal rate and regular rhythm.      Heart sounds: No murmur heard.  Pulmonary:      Effort: Pulmonary effort is normal. No respiratory distress.   Abdominal:      General: Abdomen is flat. There is no distension.      Palpations: Abdomen is soft.      Tenderness: There is no abdominal tenderness.   Skin:     Capillary Refill: Capillary refill takes less than 2 seconds.   Neurological:      Mental Status: He is alert.      Comments: Expressive aphasia  Contracted RUE   Psychiatric:         Mood and Affect: Mood normal.         Intake/Output last 3 Shifts:    Intake/Output Summary (Last 24 hours) at 11/1/2023 1331  Last data filed at 11/1/2023 1200  Gross per 24 hour   Intake 120 ml   Output 925 ml   Net -805 ml       LDA:         Vent settings:       Medications  aspirin, 81 mg, oral, Daily  clopidogrel, 75 mg, oral, Daily  digoxin, 62.5 mcg, oral, Daily  pantoprazole, 40 mg, oral, Daily before breakfast   Or  esomeprazole, 40 mg, nasoduodenal tube, Daily before breakfast   Or  pantoprazole, 40 mg, intravenous, Daily before breakfast  ezetimibe, 10 mg, oral, Daily  ferrous sulfate, 1 tablet, oral, Daily with breakfast  heparin (porcine), 5,000 Units, subcutaneous, q8h  perflutren lipid microspheres, 0.5-10 mL of dilution, intravenous, Once in imaging  perflutren lipid microspheres,  0.5-10 mL of dilution, intravenous, Once in imaging  perflutren lipid microspheres, 4 mL of dilution, intravenous, Once in imaging  perflutren protein A microsphere, 0.5 mL, intravenous, Once in imaging  perflutren protein A microsphere, 0.5 mL, intravenous, Once in imaging  sotalol, 80 mg, oral, BID  sulfur hexafluoride microsphr, 2 mL, intravenous, Once in imaging  sulfur hexafluoride microsphr, 2 mL, intravenous, Once in imaging         PRN medications: melatonin    Lab Results  Results for orders placed or performed during the hospital encounter of 10/30/23 (from the past 24 hour(s))   Urinalysis with Reflex Microscopic and Culture   Result Value Ref Range    Color, Urine Yellow Straw, Yellow    Appearance, Urine Clear Clear    Specific Gravity, Urine 1.030 1.005 - 1.035    pH, Urine 6.0 5.0, 5.5, 6.0, 6.5, 7.0, 7.5, 8.0    Protein, Urine NEGATIVE NEGATIVE mg/dL    Glucose, Urine NEGATIVE NEGATIVE mg/dL    Blood, Urine NEGATIVE NEGATIVE    Ketones, Urine NEGATIVE NEGATIVE mg/dL    Bilirubin, Urine NEGATIVE NEGATIVE    Urobilinogen, Urine <2.0 <2.0 mg/dL    Nitrite, Urine NEGATIVE NEGATIVE    Leukocyte Esterase, Urine NEGATIVE NEGATIVE   Extra Urine Gray Tube   Result Value Ref Range    Extra Tube Hold for add-ons.    Folate   Result Value Ref Range    Folate, Serum 23.1 >5.0 ng/mL   CBC and Auto Differential   Result Value Ref Range    WBC 9.2 4.4 - 11.3 x10*3/uL    nRBC 0.0 0.0 - 0.0 /100 WBCs    RBC 2.80 (L) 4.50 - 5.90 x10*6/uL    Hemoglobin 8.7 (L) 13.5 - 17.5 g/dL    Hematocrit 26.9 (L) 41.0 - 52.0 %    MCV 96 80 - 100 fL    MCH 31.1 26.0 - 34.0 pg    MCHC 32.3 32.0 - 36.0 g/dL    RDW 13.3 11.5 - 14.5 %    Platelets 142 (L) 150 - 450 x10*3/uL    MPV 9.9 7.5 - 11.5 fL    Neutrophils % 69.1 40.0 - 80.0 %    Immature Granulocytes %, Automated 0.4 0.0 - 0.9 %    Lymphocytes % 9.3 13.0 - 44.0 %    Monocytes % 16.6 2.0 - 10.0 %    Eosinophils % 4.1 0.0 - 6.0 %    Basophils % 0.5 0.0 - 2.0 %    Neutrophils  Absolute 6.33 (H) 1.60 - 5.50 x10*3/uL    Immature Granulocytes Absolute, Automated 0.04 0.00 - 0.50 x10*3/uL    Lymphocytes Absolute 0.85 0.80 - 3.00 x10*3/uL    Monocytes Absolute 1.52 (H) 0.05 - 0.80 x10*3/uL    Eosinophils Absolute 0.38 0.00 - 0.40 x10*3/uL    Basophils Absolute 0.05 0.00 - 0.10 x10*3/uL   Renal Function Panel   Result Value Ref Range    Glucose 120 (H) 74 - 99 mg/dL    Sodium 140 136 - 145 mmol/L    Potassium 4.0 3.5 - 5.3 mmol/L    Chloride 106 98 - 107 mmol/L    Bicarbonate 29 21 - 32 mmol/L    Anion Gap 9 (L) 10 - 20 mmol/L    Urea Nitrogen 22 6 - 23 mg/dL    Creatinine 1.01 0.50 - 1.30 mg/dL    eGFR 72 >60 mL/min/1.73m*2    Calcium 9.0 8.6 - 10.6 mg/dL    Phosphorus 3.0 2.5 - 4.9 mg/dL    Albumin 3.3 (L) 3.4 - 5.0 g/dL   Magnesium   Result Value Ref Range    Magnesium 2.17 1.60 - 2.40 mg/dL   Vitamin B12   Result Value Ref Range    Vitamin B12 611 211 - 911 pg/mL       Hospital course:   88 year old male with hx of aortic stenosis who presented for scheduled TAVR on 10/30. Post procedure complicated by disorientation and aphasia. 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 Patient followed by neuro stroke while here with plans for dapt and vascular neurology follow up in 3 months. Patients warfarin being held I/s/o infarct. Stable for floor transfer.        Imaging Results:       Assessment/Plan     88M w/ severe aortic stenosis s/p 10/30 TAVR p/w disorientation, expressive aphasia, receptive aphasia. 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 Toxic-metabolic encephalopathy and digoxin toxicity remains on the differential.     11/1 updates:  - Ongoing expressive aphasia  - Neurology recommendations: no need for brain MRI, continue dapt and follow up with vascular neurology in 3 months.   - Patient stable for floor transfer     Neuro:  #Disorientation  #Receptive Aphasia  #Expressive Aphasia  #Left parietal lobe stroke  -  delirium precautions  - Dapt, vascular neurology follow up in 3 months     CV:  #Severe Aortic Stenosis s/p 10/30 TAVR  - Structural Cardiology on board     #Afib  #Sick Sinus Syndrome/ hx of Second Degree AV block s/p pacemaker  - ASA 81mg PO every day  - Clopidogrel 75mg PO every day  - digoxin 62.5mcg PO every day  - sotalol 80mg PO BID  - holding warfarin d/t potential hemorrhagic conversion of AC     #HTN  - Holding lisinopril I/s/o stroke      Resp:  - no acute issues     GI:  #GI ppx  - pantoprazole 40mg PO every day     Renal/:  - no acute issues     ID:  - no acute issues     O2: ORA  Pressors: none  F: none  E: none  N: Cardiac Diet  A: PIV  DVT ppx: heparin 5000U subcutaneous q8hr until AC resumption          Joel Contreras MD

## 2023-11-02 ENCOUNTER — HOSPITAL ENCOUNTER (OUTPATIENT)
Dept: CARDIOLOGY | Facility: HOSPITAL | Age: 88
Discharge: HOME | End: 2023-11-02
Payer: MEDICARE

## 2023-11-02 LAB
ALBUMIN SERPL BCP-MCNC: 3.1 G/DL (ref 3.4–5)
ANION GAP SERPL CALC-SCNC: 14 MMOL/L (ref 10–20)
APPEARANCE UR: CLEAR
BACTERIA #/AREA URNS AUTO: ABNORMAL /HPF
BILIRUB UR STRIP.AUTO-MCNC: NEGATIVE MG/DL
BUN SERPL-MCNC: 25 MG/DL (ref 6–23)
CALCIUM SERPL-MCNC: 8.6 MG/DL (ref 8.6–10.6)
CHLORIDE SERPL-SCNC: 102 MMOL/L (ref 98–107)
CO2 SERPL-SCNC: 25 MMOL/L (ref 21–32)
COLOR UR: YELLOW
CREAT SERPL-MCNC: 1.26 MG/DL (ref 0.5–1.3)
DIGOXIN SERPL-MCNC: 0.7 NG/ML (ref 0.8–?)
ERYTHROCYTE [DISTWIDTH] IN BLOOD BY AUTOMATED COUNT: 13.5 % (ref 11.5–14.5)
FERRITIN SERPL-MCNC: 92 NG/ML (ref 20–300)
GFR SERPL CREATININE-BSD FRML MDRD: 56 ML/MIN/1.73M*2
GLUCOSE SERPL-MCNC: 223 MG/DL (ref 74–99)
GLUCOSE UR STRIP.AUTO-MCNC: NEGATIVE MG/DL
HCT VFR BLD AUTO: 25.1 % (ref 41–52)
HGB BLD-MCNC: 7.8 G/DL (ref 13.5–17.5)
HYALINE CASTS #/AREA URNS AUTO: ABNORMAL /LPF
INR PPP: 1.3 (ref 0.9–1.1)
IRON SATN MFR SERPL: 7 % (ref 25–45)
IRON SERPL-MCNC: 19 UG/DL (ref 35–150)
KETONES UR STRIP.AUTO-MCNC: NEGATIVE MG/DL
LEUKOCYTE ESTERASE UR QL STRIP.AUTO: NEGATIVE
MAGNESIUM SERPL-MCNC: 2.02 MG/DL (ref 1.6–2.4)
MCH RBC QN AUTO: 31.5 PG (ref 26–34)
MCHC RBC AUTO-ENTMCNC: 31.1 G/DL (ref 32–36)
MCV RBC AUTO: 101 FL (ref 80–100)
MUCOUS THREADS #/AREA URNS AUTO: ABNORMAL /LPF
NITRITE UR QL STRIP.AUTO: NEGATIVE
NRBC BLD-RTO: 0 /100 WBCS (ref 0–0)
PH UR STRIP.AUTO: 5 [PH]
PHOSPHATE SERPL-MCNC: 3 MG/DL (ref 2.5–4.9)
PLATELET # BLD AUTO: 147 X10*3/UL (ref 150–450)
POTASSIUM SERPL-SCNC: 3.9 MMOL/L (ref 3.5–5.3)
PROT UR STRIP.AUTO-MCNC: ABNORMAL MG/DL
PROTHROMBIN TIME: 14.2 SECONDS (ref 9.8–12.8)
RBC # BLD AUTO: 2.48 X10*6/UL (ref 4.5–5.9)
RBC # UR STRIP.AUTO: NEGATIVE /UL
RBC #/AREA URNS AUTO: ABNORMAL /HPF
SODIUM SERPL-SCNC: 137 MMOL/L (ref 136–145)
SP GR UR STRIP.AUTO: 1.02
TIBC SERPL-MCNC: 284 UG/DL (ref 240–445)
UIBC SERPL-MCNC: 265 UG/DL (ref 110–370)
UROBILINOGEN UR STRIP.AUTO-MCNC: 2 MG/DL
WBC # BLD AUTO: 11.4 X10*3/UL (ref 4.4–11.3)
WBC #/AREA URNS AUTO: ABNORMAL /HPF

## 2023-11-02 PROCEDURE — 83540 ASSAY OF IRON: CPT

## 2023-11-02 PROCEDURE — 81001 URINALYSIS AUTO W/SCOPE: CPT

## 2023-11-02 PROCEDURE — 80069 RENAL FUNCTION PANEL: CPT

## 2023-11-02 PROCEDURE — 85610 PROTHROMBIN TIME: CPT

## 2023-11-02 PROCEDURE — 36415 COLL VENOUS BLD VENIPUNCTURE: CPT

## 2023-11-02 PROCEDURE — 2500000005 HC RX 250 GENERAL PHARMACY W/O HCPCS

## 2023-11-02 PROCEDURE — 80162 ASSAY OF DIGOXIN TOTAL: CPT

## 2023-11-02 PROCEDURE — 97530 THERAPEUTIC ACTIVITIES: CPT | Mod: GP

## 2023-11-02 PROCEDURE — 2500000001 HC RX 250 WO HCPCS SELF ADMINISTERED DRUGS (ALT 637 FOR MEDICARE OP)

## 2023-11-02 PROCEDURE — 93010 ELECTROCARDIOGRAM REPORT: CPT | Performed by: INTERNAL MEDICINE

## 2023-11-02 PROCEDURE — 1200000002 HC GENERAL ROOM WITH TELEMETRY DAILY

## 2023-11-02 PROCEDURE — 85027 COMPLETE CBC AUTOMATED: CPT

## 2023-11-02 PROCEDURE — 97110 THERAPEUTIC EXERCISES: CPT | Mod: GP

## 2023-11-02 PROCEDURE — 2500000001 HC RX 250 WO HCPCS SELF ADMINISTERED DRUGS (ALT 637 FOR MEDICARE OP): Performed by: STUDENT IN AN ORGANIZED HEALTH CARE EDUCATION/TRAINING PROGRAM

## 2023-11-02 PROCEDURE — 96372 THER/PROPH/DIAG INJ SC/IM: CPT

## 2023-11-02 PROCEDURE — 2500000004 HC RX 250 GENERAL PHARMACY W/ HCPCS (ALT 636 FOR OP/ED)

## 2023-11-02 PROCEDURE — 97116 GAIT TRAINING THERAPY: CPT | Mod: GP

## 2023-11-02 PROCEDURE — 83735 ASSAY OF MAGNESIUM: CPT

## 2023-11-02 PROCEDURE — 87040 BLOOD CULTURE FOR BACTERIA: CPT

## 2023-11-02 PROCEDURE — 99231 SBSQ HOSP IP/OBS SF/LOW 25: CPT | Performed by: NURSE PRACTITIONER

## 2023-11-02 PROCEDURE — 82728 ASSAY OF FERRITIN: CPT

## 2023-11-02 PROCEDURE — 2500000002 HC RX 250 W HCPCS SELF ADMINISTERED DRUGS (ALT 637 FOR MEDICARE OP, ALT 636 FOR OP/ED)

## 2023-11-02 PROCEDURE — 93005 ELECTROCARDIOGRAM TRACING: CPT

## 2023-11-02 PROCEDURE — 99233 SBSQ HOSP IP/OBS HIGH 50: CPT | Performed by: INTERNAL MEDICINE

## 2023-11-02 PROCEDURE — 92523 SPEECH SOUND LANG COMPREHEN: CPT | Mod: GN

## 2023-11-02 RX ORDER — ACETAMINOPHEN 325 MG/1
650 TABLET ORAL EVERY 6 HOURS PRN
Status: DISCONTINUED | OUTPATIENT
Start: 2023-11-02 | End: 2023-11-09 | Stop reason: HOSPADM

## 2023-11-02 RX ORDER — ASPIRIN 81 MG/1
81 TABLET ORAL DAILY
Status: DISCONTINUED | OUTPATIENT
Start: 2023-11-03 | End: 2023-11-09 | Stop reason: HOSPADM

## 2023-11-02 RX ORDER — POTASSIUM CHLORIDE 20 MEQ/1
40 TABLET, EXTENDED RELEASE ORAL ONCE
Status: DISCONTINUED | OUTPATIENT
Start: 2023-11-02 | End: 2023-11-02

## 2023-11-02 RX ORDER — ACETAMINOPHEN 325 MG/1
650 TABLET ORAL EVERY 4 HOURS PRN
Status: DISCONTINUED | OUTPATIENT
Start: 2023-11-02 | End: 2023-11-04

## 2023-11-02 RX ORDER — POTASSIUM CHLORIDE 1.5 G/1.58G
40 POWDER, FOR SOLUTION ORAL ONCE
Status: COMPLETED | OUTPATIENT
Start: 2023-11-02 | End: 2023-11-02

## 2023-11-02 RX ORDER — ACETAMINOPHEN 160 MG/5ML
650 SOLUTION ORAL EVERY 4 HOURS PRN
Status: DISCONTINUED | OUTPATIENT
Start: 2023-11-02 | End: 2023-11-04

## 2023-11-02 RX ORDER — LIDOCAINE 560 MG/1
1 PATCH PERCUTANEOUS; TOPICAL; TRANSDERMAL DAILY
Status: DISCONTINUED | OUTPATIENT
Start: 2023-11-02 | End: 2023-11-09 | Stop reason: HOSPADM

## 2023-11-02 RX ADMIN — Medication: at 18:10

## 2023-11-02 RX ADMIN — EZETIMIBE 10 MG: 10 TABLET ORAL at 08:59

## 2023-11-02 RX ADMIN — HEPARIN SODIUM 5000 UNITS: 5000 INJECTION INTRAVENOUS; SUBCUTANEOUS at 01:12

## 2023-11-02 RX ADMIN — LIDOCAINE 1 PATCH: 4 PATCH TOPICAL at 12:23

## 2023-11-02 RX ADMIN — CLOPIDOGREL BISULFATE 75 MG: 75 TABLET ORAL at 08:59

## 2023-11-02 RX ADMIN — DIGOXIN 62.5 MCG: 125 TABLET ORAL at 12:22

## 2023-11-02 RX ADMIN — SOTALOL HYDROCHLORIDE 80 MG: 80 TABLET ORAL at 21:03

## 2023-11-02 RX ADMIN — POTASSIUM CHLORIDE 40 MEQ: 1.5 POWDER, FOR SOLUTION ORAL at 01:12

## 2023-11-02 RX ADMIN — PANTOPRAZOLE SODIUM 40 MG: 40 TABLET, DELAYED RELEASE ORAL at 08:58

## 2023-11-02 RX ADMIN — ACETAMINOPHEN 650 MG: 325 TABLET ORAL at 12:23

## 2023-11-02 RX ADMIN — ASPIRIN 81 MG: 81 TABLET, COATED ORAL at 08:58

## 2023-11-02 RX ADMIN — FERROUS SULFATE TAB 325 MG (65 MG ELEMENTAL FE) 325 MG: 325 (65 FE) TAB at 08:59

## 2023-11-02 RX ADMIN — SOTALOL HYDROCHLORIDE 80 MG: 80 TABLET ORAL at 08:58

## 2023-11-02 RX ADMIN — HEPARIN SODIUM 5000 UNITS: 5000 INJECTION INTRAVENOUS; SUBCUTANEOUS at 17:55

## 2023-11-02 RX ADMIN — HEPARIN SODIUM 5000 UNITS: 5000 INJECTION INTRAVENOUS; SUBCUTANEOUS at 08:59

## 2023-11-02 SDOH — SOCIAL STABILITY: SOCIAL INSECURITY: ARE YOU OR HAVE YOU BEEN THREATENED OR ABUSED PHYSICALLY, EMOTIONALLY, OR SEXUALLY BY ANYONE?: NO

## 2023-11-02 SDOH — SOCIAL STABILITY: SOCIAL INSECURITY: DO YOU FEEL UNSAFE GOING BACK TO THE PLACE WHERE YOU ARE LIVING?: NO

## 2023-11-02 SDOH — SOCIAL STABILITY: SOCIAL INSECURITY: HAS ANYONE EVER THREATENED TO HURT YOUR FAMILY OR YOUR PETS?: NO

## 2023-11-02 SDOH — SOCIAL STABILITY: SOCIAL INSECURITY: WERE YOU ABLE TO COMPLETE ALL THE BEHAVIORAL HEALTH SCREENINGS?: YES

## 2023-11-02 SDOH — SOCIAL STABILITY: SOCIAL INSECURITY: ARE THERE ANY APPARENT SIGNS OF INJURIES/BEHAVIORS THAT COULD BE RELATED TO ABUSE/NEGLECT?: NO

## 2023-11-02 SDOH — SOCIAL STABILITY: SOCIAL INSECURITY: ABUSE: ADULT

## 2023-11-02 SDOH — SOCIAL STABILITY: SOCIAL INSECURITY: DO YOU FEEL ANYONE HAS EXPLOITED OR TAKEN ADVANTAGE OF YOU FINANCIALLY OR OF YOUR PERSONAL PROPERTY?: NO

## 2023-11-02 SDOH — SOCIAL STABILITY: SOCIAL INSECURITY: HAVE YOU HAD THOUGHTS OF HARMING ANYONE ELSE?: NO

## 2023-11-02 SDOH — SOCIAL STABILITY: SOCIAL INSECURITY: DOES ANYONE TRY TO KEEP YOU FROM HAVING/CONTACTING OTHER FRIENDS OR DOING THINGS OUTSIDE YOUR HOME?: NO

## 2023-11-02 ASSESSMENT — PAIN SCALES - PAIN ASSESSMENT IN ADVANCED DEMENTIA (PAINAD)
BREATHING: NORMAL
FACIALEXPRESSION: FACIAL GRIMACING
BODYLANGUAGE: TENSE, DISTRESSED PACING, FIDGETING
CONSOLABILITY: DISTRACTED OR REASSURED BY VOICE/TOUCH
NEGVOCALIZATION: REPEATED TROUBLED CALLING OUT, LOUD MOANING/GROANING, CRYING
TOTALSCORE: 6

## 2023-11-02 ASSESSMENT — COGNITIVE AND FUNCTIONAL STATUS - GENERAL
CLIMB 3 TO 5 STEPS WITH RAILING: TOTAL
TURNING FROM BACK TO SIDE WHILE IN FLAT BAD: A LOT
STANDING UP FROM CHAIR USING ARMS: A LOT
MOBILITY SCORE: 11
WALKING IN HOSPITAL ROOM: A LOT
MOVING FROM LYING ON BACK TO SITTING ON SIDE OF FLAT BED WITH BEDRAILS: A LOT
MOVING TO AND FROM BED TO CHAIR: A LOT

## 2023-11-02 ASSESSMENT — LIFESTYLE VARIABLES
HOW OFTEN DO YOU HAVE 6 OR MORE DRINKS ON ONE OCCASION: NEVER
SKIP TO QUESTIONS 9-10: 1
HOW MANY STANDARD DRINKS CONTAINING ALCOHOL DO YOU HAVE ON A TYPICAL DAY: PATIENT DOES NOT DRINK
AUDIT-C TOTAL SCORE: 0
AUDIT-C TOTAL SCORE: 0
HOW OFTEN DO YOU HAVE A DRINK CONTAINING ALCOHOL: NEVER

## 2023-11-02 ASSESSMENT — PAIN SCALES - GENERAL
PAINLEVEL_OUTOF10: 5 - MODERATE PAIN
PAINLEVEL_OUTOF10: 3

## 2023-11-02 ASSESSMENT — PATIENT HEALTH QUESTIONNAIRE - PHQ9
2. FEELING DOWN, DEPRESSED OR HOPELESS: NOT AT ALL
SUM OF ALL RESPONSES TO PHQ9 QUESTIONS 1 & 2: 0
1. LITTLE INTEREST OR PLEASURE IN DOING THINGS: NOT AT ALL

## 2023-11-02 ASSESSMENT — PAIN - FUNCTIONAL ASSESSMENT: PAIN_FUNCTIONAL_ASSESSMENT: 0-10

## 2023-11-02 NOTE — PROGRESS NOTES
Structural Heart Progress Note    HPI   Javier Tate is a 88 y.o. male with severe symptomatic Aortic Stenosis who presented on 10/30/23 for elective TAVR.    Patient Active Problem List    Diagnosis Date Noted    Severe aortic stenosis 10/30/2023    S/p TAVR (transcatheter aortic valve replacement), bioprosthetic 10/30/2023    Nonrheumatic aortic valve stenosis 10/20/2023      LOS: 3 days     Objective     Vitals 24 hour ranges:  Temp:  [36.8 °C (98.2 °F)-37.7 °C (99.9 °F)] 37.1 °C (98.8 °F)  Heart Rate:  [70-85] 70  Resp:  [14-32] 17  BP: (113-144)/() 138/75  SpO2:  [92 %-100 %] 96 %  Medical Gas Therapy: None (Room air)  O2 Delivery Method: Non-rebreather mask     Intake/Output last 3 Shifts:    Intake/Output Summary (Last 24 hours) at 11/2/2023 1129  Last data filed at 11/2/2023 1035  Gross per 24 hour   Intake --   Output 1000 ml   Net -1000 ml       LDA:  Peripheral IV 10/02/23 20 G Right Antecubital (Active)   Placement Date/Time: 10/02/23 1352   Size (Gauge): 20 G  Orientation: Right  Location: Antecubital   Number of days: 30       Peripheral IV 10/30/23 20 G Right;Anterior Forearm (Active)   Placement Date/Time: 10/30/23 2100   Hand Hygiene Completed: Yes  Size (Gauge): 20 G  Orientation: Right;Anterior  Location: Forearm  Site Prep: Alcohol   Number of days: 2        Vent settings:       Lab Results  Results for orders placed or performed during the hospital encounter of 10/30/23 (from the past 24 hour(s))   CBC   Result Value Ref Range    WBC 9.0 4.4 - 11.3 x10*3/uL    nRBC 0.0 0.0 - 0.0 /100 WBCs    RBC 2.75 (L) 4.50 - 5.90 x10*6/uL    Hemoglobin 8.6 (L) 13.5 - 17.5 g/dL    Hematocrit 28.0 (L) 41.0 - 52.0 %     (H) 80 - 100 fL    MCH 31.3 26.0 - 34.0 pg    MCHC 30.7 (L) 32.0 - 36.0 g/dL    RDW 13.7 11.5 - 14.5 %    Platelets 149 (L) 150 - 450 x10*3/uL   Renal function panel   Result Value Ref Range    Glucose 126 (H) 74 - 99 mg/dL    Sodium 141 136 - 145 mmol/L    Potassium 3.6 3.5 - 5.3  mmol/L    Chloride 104 98 - 107 mmol/L    Bicarbonate 28 21 - 32 mmol/L    Anion Gap 13 10 - 20 mmol/L    Urea Nitrogen 23 6 - 23 mg/dL    Creatinine 1.36 (H) 0.50 - 1.30 mg/dL    eGFR 51 (L) >60 mL/min/1.73m*2    Calcium 8.7 8.6 - 10.6 mg/dL    Phosphorus 3.0 2.5 - 4.9 mg/dL    Albumin 3.0 (L) 3.4 - 5.0 g/dL   Magnesium   Result Value Ref Range    Magnesium 2.15 1.60 - 2.40 mg/dL   Protime-INR   Result Value Ref Range    Protime 13.9 (H) 9.8 - 12.8 seconds    INR 1.2 (H) 0.9 - 1.1   POCT GLUCOSE   Result Value Ref Range    POCT Glucose 138 (H) 74 - 99 mg/dL           Medications:  Scheduled medications  aspirin, 81 mg, oral, Daily  clopidogrel, 75 mg, oral, Daily  digoxin, 62.5 mcg, oral, Daily  ezetimibe, 10 mg, oral, Daily  ferrous sulfate, 1 tablet, oral, Daily with breakfast  heparin (porcine), 5,000 Units, subcutaneous, q8h  pantoprazole, 40 mg, oral, Daily before breakfast  sotalol, 80 mg, oral, BID      Continuous medications     PRN medications  PRN medications: melatonin       Physical Exam:  Constitutional: Well developed, awake/alert/oriented x1 (to self, reoriented to place and time), no distress, cooperative  Eyes: PERRL, EOMI, clear sclera  ENMT: mucous membranes moist  Head/Neck: Neck supple, No JVD  Respiratory/Thorax: Good chest expansion, thorax symmetric, lung sounds clear but with diminished bases  Cardiovascular: Regular rate and rhythm, no murmurs, normal S1 and S2  Gastrointestinal: Nondistended, soft, non-tender, no rebound tenderness or guarding, no masses palpable, no organomegaly, +BS  Extremities: no BLE edema, no cyanosis, bilat groins c/d/i with dsd no s/s of hematoma  Neurological: alert and oriented x1 (to self, reoriented to place and time), will follow commands with all extremities, displaying receptive and expressive aphasia, right sided weakness (upper and lower)  Psychological: Appropriate mood and behavior   Skin: Warm and dry, intact.       Assessment/Plan   Javier Tate is  an 89 yo M with a PMHx significant for permanent A-fib (on warfarin), CAD (s/p PCI to LMCA and proximal LAD on 7/17/2023 on Plavix), HTN, SSS with 2nd degree AV block, PPM (2014), CKD, ANTONIO, PAD, and severe Aortic Stenosis.  Pt is now s/p TAVR - Evolut FX 34 mm on 10/30/23 with Dr. Umana and Dr. Stern.  Echo with bubbles in the LV during procedure which disappeared when IV medication stopped. Intraop echo showed mild PVL, no PC effusion, and resolution of severe AS.  Confusion and RUE shaking/tremor noted post procedure.  Neurology evaluated patient at 1338 - their initial assessment showed mild aphasia and inability to hold legs up against gravity - no definitive evidence of stroke.  BAT called again at 1828 for worsening dysmetria and aphasia.  NIHSS 5.  Neurology recommending brain imaging, lipid panel and HbA1c.  CTA showed left parietal acute infarct.    POD 1 - Overnight pt remained with confusion and aphasia remained unchanged.  Patient denies SOB, CP, ABD pain and numbness/tingling in BLE.  Neuro assessment significant for expressive aphasia, A+O x 1 (disoriented to place and time), L>R LE strength, and dysmetria noted on finger to nose test.  Patient mildly agitated and restless.  Groin sites without tenderness, no s/s of bleeding/hematoma with c/d/I dressing.  LS CTA.  +2 B/L LE pitting edema.  POD 1 ECHO showed EF 60-65%, negative bubble study, triv AI, grad 12.1/6.0, mod MR (was mod/severe), mod TR, elevated RVSP, dilated IVC, and triv PC effusion    11/2/23 - Pt euvolemic on assessment with no BLE edema, lungs with slightly diminished bases, sats stable on RA.  Pt denies CP, SOB, abd pain, groin pain.  Groin sites healing well without signs of infection.  Neurologically pt continues to be A&Ox1 (self), follows commands, displays expressive aphasia, right sided deficit remains.    Structural Heart Plan:  - Add Chocolate meal supplement to improve caloric intake  - Continue DAPT with ASA 81 mg PO daily  and Plavix 75 mg PO daily as per Neuro's recs  - Restart Coumadin per neuro/stroke for Afib (cont Plavix with Warfarin, No ASA, NO TRIPLE THERAPY)  - Needs aggressive PT/OT/Speech  - OOB -> chair with all meals  - follow up with Structural Heart clinic in 1 week, 1 month and 1 year  - f/w Primary Cards as scheduled or in 6-10 weeks  - f/w Jose Knight MD in 1-2 weeks  - pt will be given Lab recs (1 week) and ECHO rec (1 month) by RN at discharge (orders have been placed)  - Continue to assess LAAO and PADMAJA candidacy as outpatient      D/w Dr. Umana and HVI team    I spent 45 minutes in the professional and overall care of this patient.     Ronnie Chetser MSN, AGAB[facility]-BC  Acute Care Nurse Practitioner  Structural Heart / TAVR Team  Structural Pager: [phone]  (Nights) [facility] fellow pager: [phone]

## 2023-11-02 NOTE — PROGRESS NOTES
Physical Therapy    Physical Therapy Treatment    Patient Name: Javier Tate  MRN: [id number]  Today's Date: 11/2/2023  Time Calculation  Start Time: 1335  Stop Time: 1420  Time Calculation (min): 45 min       Assessment/Plan   PT Assessment  PT Assessment Results: Decreased strength, Decreased endurance, Impaired balance, Decreased mobility, Decreased coordination, Decreased cognition, Impaired judgement, Pain  Rehab Prognosis: Good  Barriers to Discharge: none  Evaluation/Treatment Tolerance: Patient limited by fatigue  Medical Staff Made Aware: Yes  Strengths: Attitude of self, Support of Caregivers  Barriers to Participation: Comorbidities  End of Session Communication: Bedside nurse  End of Session Patient Position: Bed, 3 rail up, Alarm on  PT Plan  Inpatient/Swing Bed or Outpatient: Inpatient  PT Plan  Treatment/Interventions: Bed mobility, Transfer training, Gait training, Balance training, Neuromuscular re-education, Strengthening, Endurance training, Therapeutic exercise, Therapeutic activity, Home exercise program  PT Plan: Skilled PT  PT Frequency: 4 times per week  PT Discharge Recommendations: High intensity level of continued care  Equipment Recommended upon Discharge:  (none)  PT Recommended Transfer Status: Assist x2  PT - OK to Discharge: Yes      General Visit Information:   PT  Visit  PT Received On: 11/02/23  Response to Previous Treatment: Patient with no complaints from previous session.  General  Family/Caregiver Present: Yes  Caregiver Feedback: Wife present for support  Prior to Session Communication: Bedside nurse  Patient Position Received: Bed, 3 rail up, Alarm on  Preferred Learning Style: verbal, visual  General Comment: Pt motivated to participate in therapy. The pt presented with right-sided weakness, right-side neglect that improved with verbal and tactile cues, and impaired cognition.    Subjective   Precautions:  Precautions  Medical Precautions: Fall precautions       Objective    Pain:  Pain Assessment  Pain Assessment: 0-10  Pain Score: 5 - Moderate pain  Pain Type: Acute pain  Pain Location: Leg  Pain Orientation: Right, Left, Distal  Response to Interventions: pain was not a barrier to therapy  Cognition:  Cognition  Overall Cognitive Status: Impaired  Following Commands: Follows one step commands with increased time  Memory: Exceptions to [facility]  Long-Term Memory: Impaired  Short-Term Memory: Impaired  Processing Speed: Delayed  Postural Control:     Extremity/Trunk Assessments:  RUE   RUE : Exceptions to [facility]  RUE AROM (degrees)  RUE AROM Comment: [facility]  RUE Strength  R Shoulder Flexion: 4-/5  R Elbow Flexion: 4-/5  R Elbow Extension: 4-/5  R Wrist Flexion: 4/5  R Wrist Extension: 4/5  LUE   LUE: Exceptions to [facility]  LUE AROM (degrees)  LUE AROM Comment: [facility]  LUE Strength  L Shoulder Flexion: 4+/5  L Elbow Flexion: 4+/5  L Elbow Extension: 4+/5  L Wrist Flexion: 4+/5  L Wrist Extension: 4+/5  RLE   RLE : Exceptions to [facility]  AROM RLE (degrees)  RLE AROM Comment: [facility]  Strength RLE  R Hip Flexion: 3+/5  R Knee Flexion: 4-/5  R Knee Extension: 4-/5  R Ankle Dorsiflexion: 4/5  R Ankle Plantar Flexion: 4/5  LLE   LLE : Exceptions to [facility]  AROM LLE (degrees)  LLE AROM Comment: [facility]  Strength LLE  L Hip Flexion: 4+/5  L Knee Flexion: 4+/5  L Knee Extension: 4+/5  L Ankle Dorsiflexion: 4+/5  L Ankle Plantar Flexion: 4+/5  Activity Tolerance:  Activity Tolerance  Endurance: Decreased tolerance for upright activites  Treatments:  Therapeutic Exercise  Therapeutic Exercise Performed: Yes  Therapeutic Exercise Activity 1: ankle pumps (15)  Therapeutic Exercise Activity 2: heel slides (15)  Therapeutic Exercise Activity 3: SAQ (15)  Therapeutic Exercise Activity 4: SLR (15)  Therapeutic Exercise Activity 5: Hip ABD (15)    Balance/Neuromuscular Re-Education  Balance/Neuromuscular Re-Education Activity Performed: Yes  Balance/Neuromuscular Re-Education Activity 1: SBA static sitting balance using Arian UE and LE  support  Balance/Neuromuscular Re-Education Activity 2: SBA dynamic sitting balance using Arian UE and LE support  Balance/Neuromuscular Re-Education Activity 3: max assist static standing balance using face-to-face HHA  Balance/Neuromuscular Re-Education Activity 4: max assist dynamic standing balance using face-to-face HHA    Bed Mobility  Bed Mobility: Yes  Bed Mobility 1  Bed Mobility 1: Supine to sitting  Level of Assistance 1: Moderate assistance  Bed Mobility Comments 1: HOB elevated  Bed Mobility 2  Bed Mobility  2: Sitting to supine  Level of Assistance 2: Minimum assistance  Bed Mobility Comments 2: HOB flat    Ambulation/Gait Training  Ambulation/Gait Training Performed: Yes  Ambulation/Gait Training 1  Surface 1: Level tile  Device 1: No device (face-to-face HHA)  Assistance 1: Maximum assistance, Maximum verbal cues  Quality of Gait 1: Inconsistent stride length (unsteady, flexed posture, decreased endurance)  Comments/Distance (ft) 1: 6ft  Transfers  Transfer: Yes  Transfer 1  Transfer From 1: Sit to  Transfer to 1: Stand  Transfer Device 1:  (face-to-face HHA)  Transfer Level of Assistance 1: Maximum assistance, Maximum verbal cues  Transfers 2  Transfer From 2: Stand to  Transfer to 2: Sit  Transfer Device 2:  (face-to-face HHA)  Transfer Level of Assistance 2: Minimum assistance, Minimal verbal cues    Outcome Measures:  [facility] Basic Mobility  Turning from your back to your side while in a flat bed without using bedrails: A lot  Moving from lying on your back to sitting on the side of a flat bed without using bedrails: A lot  Moving to and from bed to chair (including a wheelchair): A lot  Standing up from a chair using your arms (e.g. wheelchair or bedside chair): A lot  To walk in hospital room: A lot  Climbing 3-5 steps with railing: Total  Basic Mobility - Total Score: 11    Education Documentation  Mobility Training, taught by Patrice Alves, PT at 11/2/2023  2:43 PM.  Learner: Significant  Other, Patient  Readiness: Acceptance  Method: Explanation, Demonstration  Response: Verbalizes Understanding    Education Comments  No comments found.        OP EDUCATION:  Education  Individual(s) Educated: Patient, Spouse  Education Provided: Body Mechanics, Fall Risk, Home Exercise Program  Patient Response to Education: Patient/Caregiver Verbalized Understanding of Information    Encounter Problems       Encounter Problems (Active)       Balance       Pt will score >24 on Tinetti for low risk of falls. (Progressing)       Start:  10/31/23    Expected End:  11/14/23               Mobility       Patient will ambulate >75 ft with LRAD and supervision for improved functional mobility (Progressing)       Start:  10/31/23    Expected End:  11/14/23               Pain - Adult          Transfers       Patient to transfer to and from sit to supine independently (Progressing)       Start:  10/31/23    Expected End:  11/14/23            Patient will transfer sit to and from stand with LRAD and supervision for improved functional mobility (Progressing)       Start:  10/31/23    Expected End:  11/14/23

## 2023-11-02 NOTE — PROGRESS NOTES
11/2/23  1102 Transitional Care Coordinator   Met with patient and his wife and introduced myself as Care Coordinator and member of the discharge planning team. Patient is s/p TAVR. Discussed discharge plans and physical therapy's recommendation for high intensity therapy. They prefer patient discharge home but their bedroom is on the second level. Pt's wife expressed concerns about being able to meet his needs. Will continue to monitor patients progress. They were given a list of Acute Rehab facilities as well as area SNF's for review. Will continue to follow for home going needs.    Viola Cole RN

## 2023-11-02 NOTE — SIGNIFICANT EVENT
Javier Tate is a 88 y.o. male with history of afib (warfarin, sotalol, digoxin), CAD (7/2023 PCI), HTN, and severe aortic stenosis s/p TAVR who is currently admitted to CICU after TAVR procedure. Patient was found to have a stroke after he was noticed to have worsening dysmetria and aphasia. NIHSS 5 (1 commands, 2 ataxia, 1 language, 1 extinction). Not a candidate for TNK given unclear LKW time. Not a candidate for thrombectomy given no clear VAN signs on exam.    Post TAVR TTE showed EF 40-45%, global hypokinesis of LV, LA severely dilated, no formal bubble study.    CT head showed new left parietal infarction. CTA showed L ICA stenosis <20%, R ICA 60% and irregular basilar artery (asymptomatic).    Initially DAPT was recommended. Primary team reached out for anticoagulation planning as patient has afib. Due to a high risk of bleeding, patient can do Plavix monotherapy (structural heart preference) until resuming anticoagulation at 2 weeks post stroke.    Case was discussed with Dr. Porter.    Gila Singleton MD (Paul)  Neurology Resident, PGY3

## 2023-11-02 NOTE — PROGRESS NOTES
Speech-Language Pathology    SLP ADULT Inpatient Speech-Language Cognition    Patient Name: Javier Tate  MRN: [id number]  Today's Date: 11/2/2023             Current Problem:   88 y.o. male with a PMH of afib (warfarin, sotalol, digoxin), CAD (7/2023 PCI), HTN, and severe aortic stenosis who p/w concern for confusion noticed during a TAVR procedure. Pt. With  continued to display evidence of problems with comprehension in post-op despite discontinuation of sedation so a BAT was called and pt was admitted to the CICU. CT head showed new left parietal infarction.     SLP consulted for new onset of aphasia.    SLP Assessment:  Patient completed a series of expressive and receptive language tasks. Pt with gestures of frustration and irritation during exam.       SLP Plan:  Plan  SLP Frequency: 2x per week  SLP - OK to Discharge: Yes    Goals:   Pt will complete structured expressive language tasks given min cues with 70% accuracy.  Pt will complete structured receptive language tasks given min cues with 70% accuracy,  Pt will utilize and recall compensatory techniques given min cues with 70% accuracy.     Cognition:  Cognition  Overall Cognitive Status: Impaired  Arousal/Alertness: Delayed responses to stimuli  Following Commands:  (impaired- inconsistent with 1 step)       Motor Speech Production:  Motor Speech Production  Automatic Speech: Impaired  Repetition: WFL  Intelligibility: WFL  Paralinguistic Features: Monotone  Respiratory Support: WFL      Auditory Comprehension:   Auditory Comprehension  Yes/No Questions: Impaired  Commands: Impaired  One Step Basic Commands: Impaired  Two Step Basic Commands: Impaired  Conversation: Impaired         Reading Comprehension:  Reading Comprehension  Scanning/Tracking : Impaired  Sentence : Impaired      Verbal:  Verbal Expression  Primary Mode of Expression: Verbal  Primary Language: English  Confrontation Naming: Within Functional Limits  Responsive Naming:  Impaired  Repetition: Within Functional Limits  Sentence Completion: Impaired  Open Ended Questions: Impaired  Conversation: Impaired  Affect: Impaired  Eye Contact: Impaired  Topic Initiation: Impaired  Topic Maintenance: Impaired  Turn Taking: Impaired      Written Expression: n/a right hand dominant

## 2023-11-02 NOTE — PROGRESS NOTES
Subjective Data:  Patient sitting up in bed, NAD. Remains aphasic, difficult to assess orientation. Follows commands. Noted to be warm to touch this AM, borderline temps, febrile this afternoon, pan cultured. Spouse at bedside updated on POC.    - borderline temps, 37.9 C, warm to touch, WBC very slightly elevated to 11.4  - BC x2 collected, UA/UC pending  - per Neurology, due to a high risk of bleeding, rec Plavix monotherapy ( preference) until resuming anticoagulation at 2 wks post stroke   - per Structural Heart, cont DAPT with ASA 81 mg and Plavix 75 mg PO daily, restart Coumadin per neuro/stroke, then cont Plavix with Warfarin, NO TRIPLE THERAPY  - pending further discussion with attending in AM   - PT/OT rec AR, pending placement    Overnight Events:    Transferred out of ICU to  3 HVI service     Objective Data:  Last Recorded Vitals:  Vitals:    11/02/23 0521 11/02/23 1035 11/02/23 1442 11/02/23 1623   BP: 138/75 127/65 127/64    BP Location: Left arm      Patient Position: Lying      Pulse: 70 74 80    Resp: 17 18 18    Temp: 37.1 °C (98.8 °F) 37.5 °C (99.5 °F) 37.9 °C (100.2 °F) 37.3 °C (99.1 °F)   TempSrc: Temporal      SpO2: 96% 99% 97%    Weight: 62.3 kg (137 lb 5.6 oz)      Height:           Last Labs:  Results for orders placed or performed during the hospital encounter of 10/30/23 (from the past 24 hour(s))   CBC   Result Value Ref Range    WBC 9.0 4.4 - 11.3 x10*3/uL    nRBC 0.0 0.0 - 0.0 /100 WBCs    RBC 2.75 (L) 4.50 - 5.90 x10*6/uL    Hemoglobin 8.6 (L) 13.5 - 17.5 g/dL    Hematocrit 28.0 (L) 41.0 - 52.0 %     (H) 80 - 100 fL    MCH 31.3 26.0 - 34.0 pg    MCHC 30.7 (L) 32.0 - 36.0 g/dL    RDW 13.7 11.5 - 14.5 %    Platelets 149 (L) 150 - 450 x10*3/uL   Renal function panel   Result Value Ref Range    Glucose 126 (H) 74 - 99 mg/dL    Sodium 141 136 - 145 mmol/L    Potassium 3.6 3.5 - 5.3 mmol/L    Chloride 104 98 - 107 mmol/L    Bicarbonate 28 21 - 32 mmol/L    Anion Gap 13 10 - 20  "mmol/L    Urea Nitrogen 23 6 - 23 mg/dL    Creatinine 1.36 (H) 0.50 - 1.30 mg/dL    eGFR 51 (L) >60 mL/min/1.73m*2    Calcium 8.7 8.6 - 10.6 mg/dL    Phosphorus 3.0 2.5 - 4.9 mg/dL    Albumin 3.0 (L) 3.4 - 5.0 g/dL   Magnesium   Result Value Ref Range    Magnesium 2.15 1.60 - 2.40 mg/dL   Protime-INR   Result Value Ref Range    Protime 13.9 (H) 9.8 - 12.8 seconds    INR 1.2 (H) 0.9 - 1.1   POCT GLUCOSE   Result Value Ref Range    POCT Glucose 138 (H) 74 - 99 mg/dL   CBC   Result Value Ref Range    WBC 11.4 (H) 4.4 - 11.3 x10*3/uL    nRBC 0.0 0.0 - 0.0 /100 WBCs    RBC 2.48 (L) 4.50 - 5.90 x10*6/uL    Hemoglobin 7.8 (L) 13.5 - 17.5 g/dL    Hematocrit 25.1 (L) 41.0 - 52.0 %     (H) 80 - 100 fL    MCH 31.5 26.0 - 34.0 pg    MCHC 31.1 (L) 32.0 - 36.0 g/dL    RDW 13.5 11.5 - 14.5 %    Platelets 147 (L) 150 - 450 x10*3/uL   Renal function panel   Result Value Ref Range    Glucose 223 (H) 74 - 99 mg/dL    Sodium 137 136 - 145 mmol/L    Potassium 3.9 3.5 - 5.3 mmol/L    Chloride 102 98 - 107 mmol/L    Bicarbonate 25 21 - 32 mmol/L    Anion Gap 14 10 - 20 mmol/L    Urea Nitrogen 25 (H) 6 - 23 mg/dL    Creatinine 1.26 0.50 - 1.30 mg/dL    eGFR 56 (L) >60 mL/min/1.73m*2    Calcium 8.6 8.6 - 10.6 mg/dL    Phosphorus 3.0 2.5 - 4.9 mg/dL    Albumin 3.1 (L) 3.4 - 5.0 g/dL   Magnesium   Result Value Ref Range    Magnesium 2.02 1.60 - 2.40 mg/dL   Protime-INR   Result Value Ref Range    Protime 14.2 (H) 9.8 - 12.8 seconds    INR 1.3 (H) 0.9 - 1.1        No results found for: \"TROPHS\", \"BNP\", \"HGBA1C\", \"LDLCALC\", \"VLDL\"   Last I/O:  I/O last 3 completed shifts:  In: - (0 mL/kg)   Out: 1000 (16.1 mL/kg) [Urine:1000 (0.4 mL/kg/hr)]  Weight: 62.3 kg     Past Cardiology Tests (Last 3 Years):  EKG:  No results found for this or any previous visit from the past 1095 days.    Echo:  Transthoracic Echo (TTE) Complete 10/31/2023      Transthoracic Echo (TTE) Limited 10/30/2023    Ejection Fractions:  No results found for: " "\"EF\"  Cath:  No results found for this or any previous visit from the past 1095 days.    Stress Test:  No results found for this or any previous visit from the past 1095 days.    Cardiac Imaging:  No results found for this or any previous visit from the past 1095 days.      Inpatient Medications:  Scheduled medications   Medication Dose Route Frequency    clopidogrel  75 mg oral Daily    digoxin  62.5 mcg oral Daily    ezetimibe  10 mg oral Daily    ferrous sulfate  1 tablet oral Daily with breakfast    heparin (porcine)  5,000 Units subcutaneous q8h    lidocaine  1 patch transdermal Daily    pantoprazole  40 mg oral Daily before breakfast    sotalol  80 mg oral BID     PRN medications   Medication    acetaminophen    Or    acetaminophen    acetaminophen    melatonin     Continuous Medications   Medication Dose Last Rate       Physical Exam:  Physical Exam  Vitals and nursing note reviewed.   Constitutional:       General: He is not in acute distress.     Appearance: Normal appearance. He is normal weight.   HENT:      Head: Atraumatic.      Mouth/Throat:      Mouth: Mucous membranes are moist.   Eyes:      General: No scleral icterus.     Extraocular Movements: Extraocular movements intact.      Conjunctiva/sclera: Conjunctivae normal.   Cardiovascular:      Rate and Rhythm: Normal rate and regular rhythm.      Pulses: Normal pulses.      Heart sounds: Normal heart sounds.   Pulmonary:      Effort: Pulmonary effort is normal. No respiratory distress.      Comments: Diminished bilateral bases  Abdominal:      General: Bowel sounds are normal. There is no distension.      Palpations: Abdomen is soft.      Tenderness: There is no abdominal tenderness. There is no guarding.   Musculoskeletal:         General: Deformity (contracted RUE) present.      Cervical back: Normal range of motion.      Right lower leg: No edema.      Left lower leg: No edema.   Skin:     General: Skin is warm and dry.      Capillary Refill: " Capillary refill takes less than 2 seconds.      Comments: Bilateral groin sites stable, CHRISTIANO   Neurological:      Motor: Weakness (Right-sided) present.      Gait: Gait abnormal.      Comments: MATIAS, d/t aphasia   Psychiatric:         Behavior: Behavior normal.       Assessment/Plan   88M w/ severe aortic stenosis s/p 10/30 TAVR p/w disorientation, expressive aphasia, receptive aphasia. 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 Toxic-metabolic encephalopathy and digoxin toxicity remains on the differential. Transferred to HVI service for further management.      Left parietal lobe stroke  Disorientation  Receptive/ Expressive Aphasia  - confusion and RUE shaking/tremor noted post procedure  - Neurology consulted, initial assessment showed mild aphasia and inability to hold legs up against gravity, no definitive evidence of stroke  -BAT called again for worsening dysmetria and aphasia.  NIHSS 5.  Neurology recommending brain imaging, lipid panel and HbA1c.   - CTA showed left parietal acute infarct.   - delirium precautions  - Neurology recommendations: no need for brain MRI, continue DAPT, follow up with vascular neurology in 3 mo, s/o.   - reached out to Neurology for AC recs/ timing to restart Warfarin given A fib  - per Neurology, due to a high risk of bleeding, now rec Plavix monotherapy ( preference) until resuming anticoagulation at 2 wks post stroke   - Tylenol PRN for pain  - cont PT/OT/SLP Consult, OOB to chair as tolerated  - Sleep/wake cycle normalization    Severe Aortic Stenosis -   - 10/30/23 s/p TAVR - Evolut FX 34 mm  with Dr. Umana and Dr. Stern.   - Echo with bubbles in the LV during procedure which disappeared when IV medication stopped. Intraop echo showed mild PVL, no PC effusion, and resolution of severe AS.   - POD 1 ECHO showed EF 60-65%, negative bubble study, triv AI, grad 12.1/6.0, mod MR (was mod/severe), mod TR, elevated RVSP, dilated  IVC, and triv PC effusion   - Euvolemic , no BLE edema, lungs with slightly diminished bases, stable on RA  - per Structural Heart, cont DAPT with ASA 81 mg and Plavix 75 mg PO daily, restart Coumadin per neuro/stroke, then cont Plavix with Warfarin, NO TRIPLE THERAPY  - pending further discussion with attending in AM  - Continue to assess LAAO and PADMAJA candidacy as outpatient    Concern for infection  - borderline temps, 37.9 C, warm to touch, WBC very slightly elevated to 11.4  - BC x2 collected, UA/UC pending  - cont to trend temps, CBC    Afib  Sick Sinus Syndrome  hx of Second Degree AV block   - s/p pacemaker  - cont digoxin 62.5mcg PO every day, Dig level pending  - holding warfarin d/t potential hemorrhagic conversion of AC  - per Neurology, hold off on resuming anticoagulation until 2 wks post stroke   - cont ASA 81 mg, Clopidogrel 75mg, sotalol 80mg PO BID    HTN  - SBP last 24 hrs: 110-140s  - Renal ultrasound/Duplex  - holding home lisinopril I/s/o stroke     Anemia  - admit hgb: 11.9, hgb s/p TAVR 9.2  - hgb trend: 7.8 ( 8.6, 8.7)  - f/up Iron studies  - Vit. B12/Folate wnl  - cont Oral Iron    GI ppx  - cont pantoprazole 40mg PO daily     Dispo  PT/OT rec Acute Rehab, pending placement    DVT ppx: subq Heparin    Seen and discussed with Dr. Johny Wilkerson    Peripheral IV 10/02/23 20 G Right Antecubital (Active)   Site Assessment Clean;Dry;Intact 11/02/23 1532   Dressing Status Clean;Dry 11/02/23 1532   Number of days: 31       Peripheral IV 10/30/23 20 G Right;Anterior Forearm (Active)   Site Assessment Clean;Dry;Intact 11/02/23 1532   Dressing Status Clean;Dry 11/02/23 1532   Number of days: 3       Code Status:  Full Code    I spent 30 minutes in the professional and overall care of this patient.    Rosa Isela Munson, APRN-CNP

## 2023-11-02 NOTE — PROGRESS NOTES
Talked to patient's wife and she states she is thinking about [facility] vs [facility] but wants to talk to her daughter to see who she thinks she should go with. I let her know that I can send referral to both and see who can accept also. Wife agreeable to sending referrals. Awaiting responses. [facility] notified.

## 2023-11-02 NOTE — NURSING NOTE
Notified Dr. Carmona of unreleased orders dated 10/27. Per MD, no need to release since required orders are currently in place.

## 2023-11-03 LAB
ALBUMIN SERPL BCP-MCNC: 3.2 G/DL (ref 3.4–5)
ANION GAP SERPL CALC-SCNC: 12 MMOL/L (ref 10–20)
BUN SERPL-MCNC: 30 MG/DL (ref 6–23)
CALCIUM SERPL-MCNC: 8.9 MG/DL (ref 8.6–10.6)
CHLORIDE SERPL-SCNC: 102 MMOL/L (ref 98–107)
CO2 SERPL-SCNC: 28 MMOL/L (ref 21–32)
CREAT SERPL-MCNC: 1.29 MG/DL (ref 0.5–1.3)
DIGITOXIN SERPL-MCNC: <5 NG/ML (ref 10–25)
ERYTHROCYTE [DISTWIDTH] IN BLOOD BY AUTOMATED COUNT: 13.3 % (ref 11.5–14.5)
GFR SERPL CREATININE-BSD FRML MDRD: 54 ML/MIN/1.73M*2
GLUCOSE SERPL-MCNC: 168 MG/DL (ref 74–99)
HCT VFR BLD AUTO: 28.7 % (ref 41–52)
HGB BLD-MCNC: 8.9 G/DL (ref 13.5–17.5)
INR PPP: 1.3 (ref 0.9–1.1)
MAGNESIUM SERPL-MCNC: 2.12 MG/DL (ref 1.6–2.4)
MCH RBC QN AUTO: 31.1 PG (ref 26–34)
MCHC RBC AUTO-ENTMCNC: 31 G/DL (ref 32–36)
MCV RBC AUTO: 100 FL (ref 80–100)
NRBC BLD-RTO: 0 /100 WBCS (ref 0–0)
PHOSPHATE SERPL-MCNC: 2.6 MG/DL (ref 2.5–4.9)
PLATELET # BLD AUTO: 166 X10*3/UL (ref 150–450)
POTASSIUM SERPL-SCNC: 3.8 MMOL/L (ref 3.5–5.3)
PROTHROMBIN TIME: 14.7 SECONDS (ref 9.8–12.8)
RBC # BLD AUTO: 2.86 X10*6/UL (ref 4.5–5.9)
SODIUM SERPL-SCNC: 138 MMOL/L (ref 136–145)
WBC # BLD AUTO: 11.4 X10*3/UL (ref 4.4–11.3)

## 2023-11-03 PROCEDURE — 99231 SBSQ HOSP IP/OBS SF/LOW 25: CPT | Performed by: NURSE PRACTITIONER

## 2023-11-03 PROCEDURE — 85610 PROTHROMBIN TIME: CPT

## 2023-11-03 PROCEDURE — 83735 ASSAY OF MAGNESIUM: CPT

## 2023-11-03 PROCEDURE — 2500000001 HC RX 250 WO HCPCS SELF ADMINISTERED DRUGS (ALT 637 FOR MEDICARE OP)

## 2023-11-03 PROCEDURE — 2500000002 HC RX 250 W HCPCS SELF ADMINISTERED DRUGS (ALT 637 FOR MEDICARE OP, ALT 636 FOR OP/ED)

## 2023-11-03 PROCEDURE — 36415 COLL VENOUS BLD VENIPUNCTURE: CPT

## 2023-11-03 PROCEDURE — 99233 SBSQ HOSP IP/OBS HIGH 50: CPT | Performed by: INTERNAL MEDICINE

## 2023-11-03 PROCEDURE — 85027 COMPLETE CBC AUTOMATED: CPT

## 2023-11-03 PROCEDURE — 97116 GAIT TRAINING THERAPY: CPT | Mod: GP | Performed by: PHYSICAL THERAPIST

## 2023-11-03 PROCEDURE — 1200000002 HC GENERAL ROOM WITH TELEMETRY DAILY

## 2023-11-03 PROCEDURE — 96372 THER/PROPH/DIAG INJ SC/IM: CPT

## 2023-11-03 PROCEDURE — 80069 RENAL FUNCTION PANEL: CPT

## 2023-11-03 PROCEDURE — 2500000004 HC RX 250 GENERAL PHARMACY W/ HCPCS (ALT 636 FOR OP/ED)

## 2023-11-03 PROCEDURE — 97110 THERAPEUTIC EXERCISES: CPT | Mod: GP | Performed by: PHYSICAL THERAPIST

## 2023-11-03 RX ORDER — FUROSEMIDE 20 MG/1
20 TABLET ORAL DAILY
Status: DISCONTINUED | OUTPATIENT
Start: 2023-11-03 | End: 2023-11-09 | Stop reason: HOSPADM

## 2023-11-03 RX ADMIN — CLOPIDOGREL BISULFATE 75 MG: 75 TABLET ORAL at 08:43

## 2023-11-03 RX ADMIN — HEPARIN SODIUM 5000 UNITS: 5000 INJECTION INTRAVENOUS; SUBCUTANEOUS at 08:43

## 2023-11-03 RX ADMIN — IRON SUCROSE 200 MG: 20 INJECTION, SOLUTION INTRAVENOUS at 18:54

## 2023-11-03 RX ADMIN — FUROSEMIDE 20 MG: 20 TABLET ORAL at 14:34

## 2023-11-03 RX ADMIN — ASPIRIN 81 MG: 81 TABLET, COATED ORAL at 08:43

## 2023-11-03 RX ADMIN — DIGOXIN 62.5 MCG: 125 TABLET ORAL at 14:34

## 2023-11-03 RX ADMIN — ACETAMINOPHEN 650 MG: 650 SOLUTION ORAL at 22:51

## 2023-11-03 RX ADMIN — HEPARIN SODIUM 5000 UNITS: 5000 INJECTION INTRAVENOUS; SUBCUTANEOUS at 01:25

## 2023-11-03 RX ADMIN — SOTALOL HYDROCHLORIDE 80 MG: 80 TABLET ORAL at 08:43

## 2023-11-03 RX ADMIN — PANTOPRAZOLE SODIUM 40 MG: 40 TABLET, DELAYED RELEASE ORAL at 06:47

## 2023-11-03 RX ADMIN — FERROUS SULFATE TAB 325 MG (65 MG ELEMENTAL FE) 325 MG: 325 (65 FE) TAB at 08:43

## 2023-11-03 RX ADMIN — HEPARIN SODIUM 5000 UNITS: 5000 INJECTION INTRAVENOUS; SUBCUTANEOUS at 18:54

## 2023-11-03 RX ADMIN — EZETIMIBE 10 MG: 10 TABLET ORAL at 08:43

## 2023-11-03 RX ADMIN — SOTALOL HYDROCHLORIDE 80 MG: 80 TABLET ORAL at 21:57

## 2023-11-03 ASSESSMENT — COGNITIVE AND FUNCTIONAL STATUS - GENERAL
CLIMB 3 TO 5 STEPS WITH RAILING: TOTAL
WALKING IN HOSPITAL ROOM: TOTAL
PERSONAL GROOMING: A LITTLE
DRESSING REGULAR UPPER BODY CLOTHING: A LOT
EATING MEALS: A LITTLE
MOBILITY SCORE: 14
MOBILITY SCORE: 11
DRESSING REGULAR LOWER BODY CLOTHING: A LITTLE
STANDING UP FROM CHAIR USING ARMS: A LOT
HELP NEEDED FOR BATHING: A LOT
TOILETING: A LITTLE
MOVING TO AND FROM BED TO CHAIR: A LOT
DAILY ACTIVITIY SCORE: 16
WALKING IN HOSPITAL ROOM: A LOT
STANDING UP FROM CHAIR USING ARMS: A LOT
MOVING TO AND FROM BED TO CHAIR: A LOT
MOVING FROM LYING ON BACK TO SITTING ON SIDE OF FLAT BED WITH BEDRAILS: A LOT
CLIMB 3 TO 5 STEPS WITH RAILING: TOTAL
TURNING FROM BACK TO SIDE WHILE IN FLAT BAD: A LOT

## 2023-11-03 ASSESSMENT — PAIN SCALES - GENERAL
PAINLEVEL_OUTOF10: 0 - NO PAIN
PAINLEVEL_OUTOF10: 3
PAINLEVEL_OUTOF10: 3
PAINLEVEL_OUTOF10: 4

## 2023-11-03 ASSESSMENT — PAIN - FUNCTIONAL ASSESSMENT
PAIN_FUNCTIONAL_ASSESSMENT: 0-10

## 2023-11-03 NOTE — PROGRESS NOTES
Structural Heart Progress Note    HPI   Javier Tate is a 88 y.o. male with severe symptomatic Aortic Stenosis who presented on 10/30/23 for elective TAVR.    Patient Active Problem List    Diagnosis Date Noted    Severe aortic stenosis 10/30/2023    S/p TAVR (transcatheter aortic valve replacement), bioprosthetic 10/30/2023    Nonrheumatic aortic valve stenosis 10/20/2023      LOS: 4 days     Objective     Vitals 24 hour ranges:  Temp:  [36.6 °C (97.9 °F)-37.9 °C (100.2 °F)] 37.4 °C (99.3 °F)  Heart Rate:  [73-80] 76  Resp:  [18-20] 20  BP: (125-142)/(60-70) 142/69  SpO2:  [95 %-100 %] 98 %  Medical Gas Therapy: None (Room air)  O2 Delivery Method: Non-rebreather mask     Intake/Output last 3 Shifts:    Intake/Output Summary (Last 24 hours) at 11/3/2023 1409  Last data filed at 11/3/2023 1048  Gross per 24 hour   Intake 360 ml   Output 625 ml   Net -265 ml         LDA:  Peripheral IV 10/02/23 20 G Right Antecubital (Active)   Placement Date/Time: 10/02/23 1352   Size (Gauge): 20 G  Orientation: Right  Location: Antecubital   Number of days: 30       Peripheral IV 10/30/23 20 G Right;Anterior Forearm (Active)   Placement Date/Time: 10/30/23 2100   Hand Hygiene Completed: Yes  Size (Gauge): 20 G  Orientation: Right;Anterior  Location: Forearm  Site Prep: Alcohol   Number of days: 2        Vent settings:       Lab Results  Results for orders placed or performed during the hospital encounter of 10/30/23 (from the past 24 hour(s))   CBC   Result Value Ref Range    WBC 11.4 (H) 4.4 - 11.3 x10*3/uL    nRBC 0.0 0.0 - 0.0 /100 WBCs    RBC 2.48 (L) 4.50 - 5.90 x10*6/uL    Hemoglobin 7.8 (L) 13.5 - 17.5 g/dL    Hematocrit 25.1 (L) 41.0 - 52.0 %     (H) 80 - 100 fL    MCH 31.5 26.0 - 34.0 pg    MCHC 31.1 (L) 32.0 - 36.0 g/dL    RDW 13.5 11.5 - 14.5 %    Platelets 147 (L) 150 - 450 x10*3/uL   Renal function panel   Result Value Ref Range    Glucose 223 (H) 74 - 99 mg/dL    Sodium 137 136 - 145 mmol/L    Potassium 3.9  3.5 - 5.3 mmol/L    Chloride 102 98 - 107 mmol/L    Bicarbonate 25 21 - 32 mmol/L    Anion Gap 14 10 - 20 mmol/L    Urea Nitrogen 25 (H) 6 - 23 mg/dL    Creatinine 1.26 0.50 - 1.30 mg/dL    eGFR 56 (L) >60 mL/min/1.73m*2    Calcium 8.6 8.6 - 10.6 mg/dL    Phosphorus 3.0 2.5 - 4.9 mg/dL    Albumin 3.1 (L) 3.4 - 5.0 g/dL   Magnesium   Result Value Ref Range    Magnesium 2.02 1.60 - 2.40 mg/dL   Protime-INR   Result Value Ref Range    Protime 14.2 (H) 9.8 - 12.8 seconds    INR 1.3 (H) 0.9 - 1.1   Blood Culture    Specimen: Peripheral Venipuncture; Blood culture   Result Value Ref Range    Blood Culture Loaded on Instrument - Culture in progress    Blood Culture    Specimen: Peripheral Venipuncture; Blood culture   Result Value Ref Range    Blood Culture Loaded on Instrument - Culture in progress    Digoxin   Result Value Ref Range    Digoxin  0.70 (L) 0.80 - <2.00 ng/mL   Iron and TIBC   Result Value Ref Range    Iron 19 (L) 35 - 150 ug/dL    UIBC 265 110 - 370 ug/dL    TIBC 284 240 - 445 ug/dL    % Saturation 7 (L) 25 - 45 %   Ferritin   Result Value Ref Range    Ferritin 92 20 - 300 ng/mL   Urinalysis with Reflex Microscopic   Result Value Ref Range    Color, Urine Yellow Straw, Yellow    Appearance, Urine Clear Clear    Specific Gravity, Urine 1.025 1.005 - 1.035    pH, Urine 5.0 5.0, 5.5, 6.0, 6.5, 7.0, 7.5, 8.0    Protein, Urine 30 (1+) (N) NEGATIVE mg/dL    Glucose, Urine NEGATIVE NEGATIVE mg/dL    Blood, Urine NEGATIVE NEGATIVE    Ketones, Urine NEGATIVE NEGATIVE mg/dL    Bilirubin, Urine NEGATIVE NEGATIVE    Urobilinogen, Urine 2.0 (N) <2.0 mg/dL    Nitrite, Urine NEGATIVE NEGATIVE    Leukocyte Esterase, Urine NEGATIVE NEGATIVE   Microscopic Only, Urine   Result Value Ref Range    WBC, Urine 1-5 1-5, NONE /HPF    RBC, Urine 6-10 (A) NONE, 1-2, 3-5 /HPF    Bacteria, Urine 1+ (A) NONE SEEN /HPF    Mucus, Urine 1+ Reference range not established. /LPF    Hyaline Casts, Urine OCCASIONAL (A) NONE /LPF   Urine Tube    Result Value Ref Range    Extra Tube Hold for add-ons.            Medications:  Scheduled medications  aspirin, 81 mg, oral, Daily  clopidogrel, 75 mg, oral, Daily  digoxin, 62.5 mcg, oral, Daily  ezetimibe, 10 mg, oral, Daily  ferrous sulfate, 1 tablet, oral, Daily with breakfast  furosemide, 20 mg, oral, Daily  heparin (porcine), 5,000 Units, subcutaneous, q8h  lidocaine, 1 patch, transdermal, Daily  pantoprazole, 40 mg, oral, Daily before breakfast  sotalol, 80 mg, oral, BID      Continuous medications     PRN medications  PRN medications: acetaminophen **OR** acetaminophen, acetaminophen, melatonin       Physical Exam:  Constitutional: Well developed, awake/alert/oriented x1 (to self, reoriented to place and time), no distress, cooperative  Eyes: PERRL, EOMI, clear sclera  ENMT: mucous membranes moist  Head/Neck: Neck supple, No JVD  Respiratory/Thorax: decreased chest expansion, thorax symmetric, lung sounds diminished throughout  Cardiovascular: Regular rate and rhythm, no murmurs, normal S1 and S2  Gastrointestinal: Nondistended, soft, non-tender, no rebound tenderness or guarding, no masses palpable, no organomegaly, +BS  Extremities: no BLE edema, no cyanosis, bilat groins c/d/i with dsd no s/s of hematoma  Neurological: alert and oriented x1 (to self, reoriented to place and time), will follow commands with all extremities, displaying receptive and expressive aphasia, right sided weakness (upper and lower)  Psychological: Appropriate mood and behavior   Skin: Warm and dry, intact.       Assessment/Plan   Javier Tate is an 89 yo M with a PMHx significant for permanent A-fib (on warfarin), CAD (s/p PCI to LMCA and proximal LAD on 7/17/2023 on Plavix), HTN, SSS with 2nd degree AV block, PPM (2014), CKD, ANTONIO, PAD, and severe Aortic Stenosis.  Pt is now s/p TAVR - Evolut FX 34 mm on 10/30/23 with Dr. Umana and Dr. Stern.  Echo with bubbles in the LV during procedure which disappeared when IV medication  stopped. Intraop echo showed mild PVL, no PC effusion, and resolution of severe AS.  Confusion and RUE shaking/tremor noted post procedure.  Neurology evaluated patient at 1338 - their initial assessment showed mild aphasia and inability to hold legs up against gravity - no definitive evidence of stroke.  BAT called again at 1828 for worsening dysmetria and aphasia.  NIHSS 5.  Neurology recommending brain imaging, lipid panel and HbA1c.  CTA showed left parietal acute infarct.    POD 1 - Overnight pt remained with confusion and aphasia remained unchanged.  Patient denies SOB, CP, ABD pain and numbness/tingling in BLE.  Neuro assessment significant for expressive aphasia, A+O x 1 (disoriented to place and time), L>R LE strength, and dysmetria noted on finger to nose test.  Patient mildly agitated and restless.  Groin sites without tenderness, no s/s of bleeding/hematoma with c/d/I dressing.  LS CTA.  +2 B/L LE pitting edema.  POD 1 ECHO showed EF 60-65%, negative bubble study, triv AI, grad 12.1/6.0, mod MR (was mod/severe), mod TR, elevated RVSP, dilated IVC, and triv PC effusion    11/2/23 - Pt euvolemic on assessment with no BLE edema, lungs with slightly diminished bases, sats stable on RA.  Pt denies CP, SOB, abd pain, groin pain.  Groin sites healing well without signs of infection.  Neurologically pt continues to be A&Ox1 (self), follows commands, displays expressive aphasia, right sided deficit remains.    11/3/23 - No change in neuro status.  Slightly more FVO'ed today with diminished lung sounds diminished throughout, pulsating JVD fluid wave (not present yesterday).  Wife states that she noticed he seemed to be a little more short of breath today.    Structural Heart Plan:  - Agree with gentle diuresis  - Continue DAPT with ASA 81 mg PO daily and Plavix 75 mg PO daily (s/p PCI in July 2023)  - Restart Coumadin in 2 weeks per neuro/stroke for Afib (cont Plavix with Warfarin.  No ASA, NO TRIPLE THERAPY)  -  Needs aggressive PT/OT/Speech  - OOB -> chair with all meals  - follow up with Structural Heart clinic in 1 week, 1 month and 1 year  - f/w Primary Cards as scheduled or in 6-10 weeks  - f/w Jose Knight MD in 1-2 weeks  - pt will be given Lab recs (1 week) and ECHO rec (1 month) by RN at discharge (orders have been placed)  - Continue to assess LAAO and PADMAJA candidacy as outpatient      D/w Dr. Umana and HVI team    I spent 45 minutes in the professional and overall care of this patient.     Ronnie Chester MSN, AGACNP-BC  Acute Care Nurse Practitioner  Structural Heart / TAVR Team  Structural Pager: [phone]  (Nights) [facility] fellow pager: [phone]

## 2023-11-03 NOTE — PROGRESS NOTES
Speech-Language Pathology                 Therapy Communication Note    Patient Name: Javier Tate  MRN: [id number]  Today's Date: 11/3/2023     Discipline: Speech Language Pathology    Missed Visit Reason:      Missed Time: Attempt    Comment: Pt currently working with PT. Speech Language Pathologist to attempt at another time.

## 2023-11-03 NOTE — PROGRESS NOTES
Physical Therapy    Physical Therapy Treatment    Patient Name: Javier Tate  MRN: [id number]  Today's Date: 11/3/2023  Time Calculation  Start Time: 1359  Stop Time: 1434  Time Calculation (min): 35 min       Assessment/Plan   PT Assessment  PT Assessment Results: Decreased strength, Decreased range of motion, Decreased endurance, Impaired balance, Decreased mobility, Decreased coordination, Decreased cognition, Impaired judgement, Decreased safety awareness, Impaired vision, Pain  Rehab Prognosis: Good  Barriers to Discharge: none  Evaluation/Treatment Tolerance: Patient limited by fatigue, Patient limited by pain  Medical Staff Made Aware: Yes  Strengths: Support of extended family/friends  Barriers to Participation:  (none)  End of Session Communication: Bedside nurse  End of Session Patient Position: Bed, 3 rail up, Alarm on, Alarm off, caregiver present  PT Plan  Inpatient/Swing Bed or Outpatient: Inpatient  PT Plan  Treatment/Interventions: Bed mobility, Transfer training, Gait training, Balance training, Neuromuscular re-education, Strengthening, Endurance training, Range of motion, Therapeutic exercise, Therapeutic activity, Home exercise program  PT Plan: Skilled PT  PT Frequency: 4 times per week  PT Discharge Recommendations: High intensity level of continued care  Equipment Recommended upon Discharge:  (none)  PT Recommended Transfer Status: Assist x2  PT - OK to Discharge: Yes      General Visit Information:   PT  Visit  PT Received On: 11/03/23  Response to Previous Treatment: Patient with no complaints from previous session.  General  Family/Caregiver Present: Yes  Caregiver Feedback: wife and daughter present for support  Prior to Session Communication: Bedside nurse  Patient Position Received: Bed, 3 rail up, Alarm on  Preferred Learning Style: verbal  General Comment: The pt willing to participate in therapy.    Subjective   Precautions:  Precautions  Medical Precautions: Fall precautions        Objective   Pain:  Pain Assessment  Pain Assessment: 0-10  Pain Score: 4  Pain Type: Chronic pain  Pain Location: Knee  Pain Orientation: Right  Response to Interventions: pain was not a barrier to therapy  Cognition:  Cognition  Overall Cognitive Status: Impaired  Arousal/Alertness: Inconsistent responses to stimuli  Following Commands: Follows one step commands with increased time  Memory: Exceptions to [facility]  Long-Term Memory: Impaired  Short-Term Memory: Impaired  Problem Solving: Exceptions to [facility]  Complex Functional Tasks: Impaired  Managing Finances: Impaired  Simple Functional Tasks: Impaired  Verbal Reasoning Skills: Impaired  Processing Speed: Delayed  Postural Control:     Extremity/Trunk Assessments:  RUE   RUE : Exceptions to [facility]  RUE AROM (degrees)  RUE AROM Comment: [facility]  RUE Strength  R Shoulder Flexion: 4-/5  R Elbow Flexion: 4-/5  R Elbow Extension: 4-/5  R Wrist Flexion: 4/5  R Wrist Extension: 4/5  LUE   LUE: Exceptions to [facility]  LUE AROM (degrees)  LUE AROM Comment: [facility]  LUE Strength  L Shoulder Flexion: 4+/5  L Elbow Flexion: 4+/5  L Elbow Extension: 4+/5  L Wrist Flexion: 4+/5  L Wrist Extension: 4+/5  RLE   RLE : Exceptions to [facility]  AROM RLE (degrees)  RLE AROM Comment: decreased knee flexion  Strength RLE  R Hip Flexion: 3+/5  R Knee Flexion: 4-/5  R Knee Extension: 4-/5  R Ankle Dorsiflexion: 4/5  R Ankle Plantar Flexion: 4/5  LLE   LLE : Exceptions to [facility]  AROM LLE (degrees)  LLE AROM Comment: decreased knee flexion  Strength LLE  L Hip Flexion: 4+/5  L Knee Flexion: 4+/5  L Knee Extension: 4+/5  L Ankle Dorsiflexion: 4+/5  L Ankle Plantar Flexion: 4+/5  Activity Tolerance:  Activity Tolerance  Endurance: Decreased tolerance for upright activites  Treatments:  Therapeutic Exercise  Therapeutic Exercise Performed: Yes  Therapeutic Exercise Activity 1: ankle pumps (15)  Therapeutic Exercise Activity 2: heel slides (15)  Therapeutic Exercise Activity 3: SAQ (15)  Therapeutic Exercise Activity  4: SLR (15)  Therapeutic Exercise Activity 5: Hip ABD (15)    Balance/Neuromuscular Re-Education  Balance/Neuromuscular Re-Education Activity Performed: Yes  Balance/Neuromuscular Re-Education Activity 1: SBA static sitting balance using Arian UE and LE support  Balance/Neuromuscular Re-Education Activity 2: CGA dynamic sitting balance using Arian UE and LE support  Balance/Neuromuscular Re-Education Activity 3: max assist static standing balance using face-to-face HHA  Balance/Neuromuscular Re-Education Activity 4: max assist dynamic standing balance using face-to-face HHA    Bed Mobility  Bed Mobility: Yes  Bed Mobility 1  Bed Mobility 1: Supine to sitting  Level of Assistance 1: Moderate assistance  Bed Mobility Comments 1: HOB elevated  Bed Mobility 2  Bed Mobility  2: Sitting to supine  Level of Assistance 2: Minimum assistance  Bed Mobility Comments 2: HOB flat    Ambulation/Gait Training  Ambulation/Gait Training Performed: Yes  Ambulation/Gait Training 1  Surface 1: Level tile  Device 1: No device (face-to-face HHA)  Assistance 1: Maximum assistance  Quality of Gait 1: Inconsistent stride length (flexed trunk, unsteady, decreased step length, decreased kyle, unsteady, falls risk, decreased endurance)  Comments/Distance (ft) 1: 7ft  Transfers  Transfer: Yes  Transfer 1  Transfer From 1: Sit to  Transfer to 1: Stand  Transfer Device 1:  (face-to-face HHA)  Transfer Level of Assistance 1: Maximum assistance  Transfers 2  Transfer From 2: Stand to  Transfer to 2: Sit  Transfer Device 2:  (face-to-face HHA)  Transfer Level of Assistance 2: Maximum assistance    Outcome Measures:  [facility] Basic Mobility  Turning from your back to your side while in a flat bed without using bedrails: A lot  Moving from lying on your back to sitting on the side of a flat bed without using bedrails: A lot  Moving to and from bed to chair (including a wheelchair): A lot  Standing up from a chair using your arms (e.g. wheelchair or  bedside chair): A lot  To walk in hospital room: A lot  Climbing 3-5 steps with railing: Total  Basic Mobility - Total Score: 11    Education Documentation  Mobility Training, taught by Patrice Alves, PT at 11/3/2023  2:52 PM.  Learner: Family, Significant Other, Patient  Readiness: Acceptance  Method: Explanation, Demonstration  Response: Verbalizes Understanding    Education Comments  No comments found.        OP EDUCATION:  Education  Individual(s) Educated: Patient, Spouse, Child  Education Provided: Body Mechanics, Fall Risk, Home Exercise Program  Patient Response to Education: Patient/Caregiver Verbalized Understanding of Information    Encounter Problems       Encounter Problems (Active)       Balance       Pt will score >24 on Tinetti for low risk of falls. (Progressing)       Start:  10/31/23    Expected End:  11/14/23               Mobility       Patient will ambulate >75 ft with LRAD and supervision for improved functional mobility (Progressing)       Start:  10/31/23    Expected End:  11/14/23               Pain - Adult          Transfers       Patient to transfer to and from sit to supine independently (Progressing)       Start:  10/31/23    Expected End:  11/14/23            Patient will transfer sit to and from stand with LRAD and supervision for improved functional mobility (Progressing)       Start:  10/31/23    Expected End:  11/14/23

## 2023-11-03 NOTE — PROGRESS NOTES
Subjective Data:  Patient sitting up in chair, NAD. Remains aphasic, difficult to assess orientation. Follows commands. Continues to have borderline temps (99.1-100.2).    Family at bedside, concerned about his oral intake, stating he has not been eating due to having a soft foods diet. Order is currently placed as regular diet with easy to chew bites. RN denies any current concerns of dysphagia.     Family also noted concerns about his knees bilaterally, stating he has a history of arthritis (unsure if rheumatologic), and has history of injections by orthopedics as an outpatient. Family asked if patient can have his knees drained as this has previously helped with his ambulation. Patient initially denied pain of his knees, although when prompted by family, he endorsed some pain with palpation.    - mildly hypervolemic on exam-- rales at bases with elevated JVD & trace/+1 LE edema  - initiated gentle diuresis of lasix 20mg PO  - continued borderline temps (99.1-100.2) WBC yesterday very slightly elevated to 11.4, will follow up this evenings WBC  - BC x2 in progress, UA unremarkable  - structural heart made aware of neurology recs: with fresh PCI in July, its recommended to continue DAPT until warfarin can be started 11/13 (2 weeks post stroke), then cont with Plavix + Warfarin & discontinuing ASA 81  - iron studies: iron 19, TIBC 284, ferritin 92, % sat 7  - holding home PO iron & initiated IV iron 200mg x 5 days  - PT/OT rec AR, pending placement    Overnight Events:    No acute events overnight     Objective Data:  Last Recorded Vitals:  Vitals:    11/03/23 0556 11/03/23 0558 11/03/23 0835 11/03/23 1048   BP: 127/60   142/69   BP Location:       Patient Position:       Pulse: 73   76   Resp: 18   20   Temp: 37.8 °C (100 °F)  37.8 °C (100 °F) 37.4 °C (99.3 °F)   TempSrc:       SpO2: 96%   98%   Weight:  68.3 kg (150 lb 9.2 oz)     Height:           Last Labs:  Results for orders placed or performed during the  hospital encounter of 10/30/23 (from the past 24 hour(s))   CBC   Result Value Ref Range    WBC 11.4 (H) 4.4 - 11.3 x10*3/uL    nRBC 0.0 0.0 - 0.0 /100 WBCs    RBC 2.48 (L) 4.50 - 5.90 x10*6/uL    Hemoglobin 7.8 (L) 13.5 - 17.5 g/dL    Hematocrit 25.1 (L) 41.0 - 52.0 %     (H) 80 - 100 fL    MCH 31.5 26.0 - 34.0 pg    MCHC 31.1 (L) 32.0 - 36.0 g/dL    RDW 13.5 11.5 - 14.5 %    Platelets 147 (L) 150 - 450 x10*3/uL   Renal function panel   Result Value Ref Range    Glucose 223 (H) 74 - 99 mg/dL    Sodium 137 136 - 145 mmol/L    Potassium 3.9 3.5 - 5.3 mmol/L    Chloride 102 98 - 107 mmol/L    Bicarbonate 25 21 - 32 mmol/L    Anion Gap 14 10 - 20 mmol/L    Urea Nitrogen 25 (H) 6 - 23 mg/dL    Creatinine 1.26 0.50 - 1.30 mg/dL    eGFR 56 (L) >60 mL/min/1.73m*2    Calcium 8.6 8.6 - 10.6 mg/dL    Phosphorus 3.0 2.5 - 4.9 mg/dL    Albumin 3.1 (L) 3.4 - 5.0 g/dL   Magnesium   Result Value Ref Range    Magnesium 2.02 1.60 - 2.40 mg/dL   Protime-INR   Result Value Ref Range    Protime 14.2 (H) 9.8 - 12.8 seconds    INR 1.3 (H) 0.9 - 1.1   Blood Culture    Specimen: Peripheral Venipuncture; Blood culture   Result Value Ref Range    Blood Culture Loaded on Instrument - Culture in progress    Blood Culture    Specimen: Peripheral Venipuncture; Blood culture   Result Value Ref Range    Blood Culture Loaded on Instrument - Culture in progress    Digoxin   Result Value Ref Range    Digoxin  0.70 (L) 0.80 - <2.00 ng/mL   Iron and TIBC   Result Value Ref Range    Iron 19 (L) 35 - 150 ug/dL    UIBC 265 110 - 370 ug/dL    TIBC 284 240 - 445 ug/dL    % Saturation 7 (L) 25 - 45 %   Ferritin   Result Value Ref Range    Ferritin 92 20 - 300 ng/mL   Urinalysis with Reflex Microscopic   Result Value Ref Range    Color, Urine Yellow Straw, Yellow    Appearance, Urine Clear Clear    Specific Gravity, Urine 1.025 1.005 - 1.035    pH, Urine 5.0 5.0, 5.5, 6.0, 6.5, 7.0, 7.5, 8.0    Protein, Urine 30 (1+) (N) NEGATIVE mg/dL    Glucose,  "Urine NEGATIVE NEGATIVE mg/dL    Blood, Urine NEGATIVE NEGATIVE    Ketones, Urine NEGATIVE NEGATIVE mg/dL    Bilirubin, Urine NEGATIVE NEGATIVE    Urobilinogen, Urine 2.0 (N) <2.0 mg/dL    Nitrite, Urine NEGATIVE NEGATIVE    Leukocyte Esterase, Urine NEGATIVE NEGATIVE   Microscopic Only, Urine   Result Value Ref Range    WBC, Urine 1-5 1-5, NONE /HPF    RBC, Urine 6-10 (A) NONE, 1-2, 3-5 /HPF    Bacteria, Urine 1+ (A) NONE SEEN /HPF    Mucus, Urine 1+ Reference range not established. /LPF    Hyaline Casts, Urine OCCASIONAL (A) NONE /LPF   Urine Tube   Result Value Ref Range    Extra Tube Hold for add-ons.         No results found for: \"TROPHS\", \"BNP\", \"HGBA1C\", \"LDLCALC\", \"VLDL\"   Last I/O:  I/O last 3 completed shifts:  In: 360 (5.3 mL/kg) [P.O.:360]  Out: 1225 (17.9 mL/kg) [Urine:1225 (0.5 mL/kg/hr)]  Weight: 68.3 kg     Past Cardiology Tests (Last 3 Years):    Echo:  Transthoracic Echo (TTE) Complete 10/31/2023   1. Left ventricular systolic function is normal with a 60-65% estimated ejection fraction.   2. Spectral Doppler shows a pseudonormal pattern of left ventricular diastolic filling.   3. Abnormal septal motion consistent with left bundle branch block.   4. The left atrium is severely dilated.   5. The right atrium is severely dilated.   6. There is moderate to severe mitral annular calcification.   7. Moderate mitral valve regurgitation.   8. Moderate tricuspid regurgitation visualized.   9. There is a transcatheter aortic valve replacement.  10. S/p 34mm Medtronic Evolut TAVR with gradients of 12.1/6mmHg and trace perivalvular AI. There is correct TAVR placement with trace periprosthetic regurgitation.  11. In comparison to study 10/30/2023, there has been slight increase in TAVR gradients, the EF has improved from previous 40-45%( mildly reduced) to now normal at 60-65%. The degreee of MR appears to have decreased somewhat from moderate to severe to at least moderate( proximal flow convergence appears a " bit smaller today and there is not clear systolic flow reversal in the PVs which was seen yesterday.  12. Moderately elevated right ventricular systolic pressure.  13. Poorly visualized anatomical structures due to suboptimal image quality.  14. Moderate to severre MAC with mildly thickened MV leaflets with at least moderate centrally directed. Note that the degree of MR could be underestamated due to shielding from the MAC. Note that the proximal flow convergence zone is not large.    Transthoracic Echo (TTE) Limited 10/30/2023   1. Left ventricular systolic function is mildly decreased with a 40-45% estimated ejection fraction.   2. There is global hypokinesis of the left ventricle with minor regional variations.   3. The left atrium is severely dilated.   4. Moderate to severe mitral valve regurgitation.   5. Severe aortic valve stenosis.   6. Mild to moderate aortic valve regurgitation.   7. There is moderate to severe aortic valve cusp calcification.   8. There is moderate to severe mitral annular calcification.   9. There is aortic valve annular calcification.  10. Moderate to severe tricuspid regurgitation visualized.  11. Moderately elevated right ventricular systolic pressure.  12. Post TAVR - There was an improvement in aortic transvalvular gradients (now mean gradient 3 mmHg, peak gradient 6 mmHg, DI 0.7). There is a mild-to moderate chel-prosthetic regurgitation with one anterior and one posterior jet (A<P).  13. No formal bubble study was performed however bubbles are seen in the LV and LA post TAVR suggesting presence of L-R shunt. Recommend formal bubble study.      Inpatient Medications:  Scheduled medications   Medication Dose Route Frequency    aspirin  81 mg oral Daily    clopidogrel  75 mg oral Daily    digoxin  62.5 mcg oral Daily    ezetimibe  10 mg oral Daily    ferrous sulfate  1 tablet oral Daily with breakfast    furosemide  20 mg oral Daily    heparin (porcine)  5,000 Units subcutaneous  q8h    lidocaine  1 patch transdermal Daily    pantoprazole  40 mg oral Daily before breakfast    sotalol  80 mg oral BID     PRN medications   Medication    acetaminophen    Or    acetaminophen    acetaminophen    melatonin     Continuous Medications   Medication Dose Last Rate       Physical Exam:  Physical Exam  Vitals and nursing note reviewed.   Constitutional:       General: He is not in acute distress.     Appearance: Normal appearance. He is normal weight.   HENT:      Head: Atraumatic.      Mouth/Throat:      Mouth: Mucous membranes are moist.   Eyes:      General: No scleral icterus.     Extraocular Movements: Extraocular movements intact.      Conjunctiva/sclera: Conjunctivae normal.   Neck:      Vascular: JVD (elevated to mid neck) present.   Cardiovascular:      Rate and Rhythm: Normal rate and regular rhythm.      Pulses: Normal pulses.      Heart sounds: Normal heart sounds.   Pulmonary:      Effort: Pulmonary effort is normal. No respiratory distress.      Breath sounds: Decreased air movement present. Examination of the right-lower field reveals rales. Examination of the left-lower field reveals rales. Rales present.   Abdominal:      General: Bowel sounds are normal. There is no distension.      Palpations: Abdomen is soft.      Tenderness: There is no abdominal tenderness. There is no guarding.   Musculoskeletal:         General: Deformity (contracted RUE) present.      Cervical back: Normal range of motion.      Right lower leg: Edema (trace/+1) present.      Left lower leg: Edema (trace/+1) present.   Skin:     General: Skin is warm and dry.      Capillary Refill: Capillary refill takes less than 2 seconds.      Comments: Bilateral groin sites stable, CHRISTIANO   Neurological:      Motor: Weakness (Right-sided) present.      Gait: Gait abnormal.      Comments: MATIAS, d/t aphasia   Psychiatric:         Behavior: Behavior normal.       Assessment/Plan   88M w/ severe aortic stenosis s/p 10/30 TAVR p/w  disorientation, expressive aphasia, receptive aphasia. 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 Toxic-metabolic encephalopathy and digoxin toxicity remains on the differential. Transferred to HVI service for further management.      Left parietal lobe stroke  Disorientation  Receptive/ Expressive Aphasia  - confusion and RUE shaking/tremor noted post procedure  - Neurology consulted, initial assessment showed mild aphasia and inability to hold legs up against gravity, no definitive evidence of stroke  -BAT called again for worsening dysmetria and aphasia.  NIHSS 5.  Neurology recommending brain imaging, lipid panel and HbA1c.   - CTA showed left parietal acute infarct.   - delirium precautions  - Neurology recommendations: no need for brain MRI, continue DAPT, follow up with vascular neurology in 3 mo, s/o.   - per Neurology, due to a high risk of bleeding, now rec Plavix monotherapy ( preference) until resuming anticoagulation at 2 wks post stroke   - structural heart made aware of neurology recs: with fresh PCI in July, its recommended to continue DAPT until warfarin can be started 11/13 (2 weeks post stroke), then cont with Plavix + Warfarin & discontinuing ASA 81  - Tylenol PRN for pain  - cont PT/OT/SLP Consult, OOB to chair as tolerated  - Sleep/wake cycle normalization    Severe Aortic Stenosis s/p TAVR   - 10/30/23 s/p TAVR - Evolut FX 34 mm  with Dr. Umana and Dr. Stern.   - Echo with bubbles in the LV during procedure which disappeared when IV medication stopped. Intraop echo showed mild PVL, no PC effusion, and resolution of severe AS.   - POD 1 ECHO showed EF 60-65%, negative bubble study, triv AI, grad 12.1/6.0, mod MR (was mod/severe), mod TR, elevated RVSP, dilated IVC, and triv PC effusion   - mildly hypervolemic on exam-- rales at bases with elevated JVD & trace/+1 LE edema  - initiated gentle diuresis of lasix 20mg PO  - per Structural Heart,  cont DAPT with ASA 81 mg and Plavix 75 mg PO daily, restart Coumadin per neuro/stroke (2 weeks post stroke-- 11/13), then cont Plavix with Warfarin, NO TRIPLE THERAPY  - Continue to assess LAAO and PADMAJA candidacy as outpatient    Concern for infection  - 11/2 borderline temps, 37.9 C, warm to touch, WBC very slightly elevated to 11.4  - BC x2 collected & in progress, UA unremarkable  - cont to trend temps, CBC    Afib  Sick Sinus Syndrome  hx of Second Degree AV block   - s/p pacemaker  - cont digoxin 62.5mcg PO every day, Dig level 0.7  - holding warfarin d/t potential hemorrhagic conversion of AC  - per Neurology, hold off on resuming anticoagulation until 2 wks post stroke (11/13)  - cont ASA 81 mg, Clopidogrel 75mg, sotalol 80mg PO BID    HTN  - SBP last 24 hrs: 110-140s  - Renal ultrasound/Duplex  - holding home lisinopril I/s/o stroke     Anemia  - admit hgb: 11.9, hgb s/p TAVR 9.2  - hgb trend: pending this evening (7.8 8.6, 8.7)  - iron studies: iron 19, TIBC 284, ferritin 92, % sat 7  - Vit. B12/Folate wnl  - holding home PO iron  - initiated IV iron 200mg x 5 days    GI ppx  - cont pantoprazole 40mg PO daily     Chronic Arthritis  - Family also noted concerns about his knees bilaterally, stating he has a history of arthritis (unsure if rheumatologic)  - has history of injections by orthopedics as an outpatient  - Family asked if patient can have his knees drained as this has previously helped with his ambulation. Patient initially denied pain of his knees, although when prompted by family, he endorsed some pain with palpation  - will continue to reassess knees daily  - if concern for septic arthritis, will intervene while inpatient     Dispo  PT/OT rec Acute Rehab, pending placement    DVT ppx: subq Heparin    Seen and discussed with Dr. Johny Wilkerson    Peripheral IV 10/02/23 20 G Right Antecubital (Active)   Site Assessment Clean;Dry;Intact 11/02/23 1532   Dressing Status Clean;Dry 11/02/23 1532   Number of  days: 31       Peripheral IV 10/30/23 20 G Right;Anterior Forearm (Active)   Site Assessment Clean;Dry;Intact 11/02/23 1532   Dressing Status Clean;Dry 11/02/23 1532   Number of days: 3       Code Status:  Full Code    I spent 30 minutes in the professional and overall care of this patient.    JOSE BaileyC

## 2023-11-04 LAB
ALBUMIN SERPL BCP-MCNC: 2.9 G/DL (ref 3.4–5)
ANION GAP SERPL CALC-SCNC: 13 MMOL/L (ref 10–20)
BUN SERPL-MCNC: 33 MG/DL (ref 6–23)
CALCIUM SERPL-MCNC: 8.8 MG/DL (ref 8.6–10.6)
CHLORIDE SERPL-SCNC: 103 MMOL/L (ref 98–107)
CO2 SERPL-SCNC: 26 MMOL/L (ref 21–32)
CREAT SERPL-MCNC: 1.11 MG/DL (ref 0.5–1.3)
ERYTHROCYTE [DISTWIDTH] IN BLOOD BY AUTOMATED COUNT: 13.4 % (ref 11.5–14.5)
GFR SERPL CREATININE-BSD FRML MDRD: 65 ML/MIN/1.73M*2
GLUCOSE SERPL-MCNC: 148 MG/DL (ref 74–99)
HCT VFR BLD AUTO: 26.8 % (ref 41–52)
HGB BLD-MCNC: 8.4 G/DL (ref 13.5–17.5)
INR PPP: 1.4 (ref 0.9–1.1)
MAGNESIUM SERPL-MCNC: 2.1 MG/DL (ref 1.6–2.4)
MCH RBC QN AUTO: 31 PG (ref 26–34)
MCHC RBC AUTO-ENTMCNC: 31.3 G/DL (ref 32–36)
MCV RBC AUTO: 99 FL (ref 80–100)
NRBC BLD-RTO: 0 /100 WBCS (ref 0–0)
PHOSPHATE SERPL-MCNC: 2.8 MG/DL (ref 2.5–4.9)
PLATELET # BLD AUTO: 156 X10*3/UL (ref 150–450)
POTASSIUM SERPL-SCNC: 3.7 MMOL/L (ref 3.5–5.3)
PROTHROMBIN TIME: 16.1 SECONDS (ref 9.8–12.8)
RBC # BLD AUTO: 2.71 X10*6/UL (ref 4.5–5.9)
SODIUM SERPL-SCNC: 138 MMOL/L (ref 136–145)
WBC # BLD AUTO: 10.3 X10*3/UL (ref 4.4–11.3)

## 2023-11-04 PROCEDURE — 99233 SBSQ HOSP IP/OBS HIGH 50: CPT | Performed by: INTERNAL MEDICINE

## 2023-11-04 PROCEDURE — 85610 PROTHROMBIN TIME: CPT

## 2023-11-04 PROCEDURE — 2500000001 HC RX 250 WO HCPCS SELF ADMINISTERED DRUGS (ALT 637 FOR MEDICARE OP)

## 2023-11-04 PROCEDURE — 1200000002 HC GENERAL ROOM WITH TELEMETRY DAILY

## 2023-11-04 PROCEDURE — 36415 COLL VENOUS BLD VENIPUNCTURE: CPT

## 2023-11-04 PROCEDURE — 85027 COMPLETE CBC AUTOMATED: CPT

## 2023-11-04 PROCEDURE — 80069 RENAL FUNCTION PANEL: CPT

## 2023-11-04 PROCEDURE — 2500000004 HC RX 250 GENERAL PHARMACY W/ HCPCS (ALT 636 FOR OP/ED)

## 2023-11-04 PROCEDURE — 83735 ASSAY OF MAGNESIUM: CPT

## 2023-11-04 PROCEDURE — 96372 THER/PROPH/DIAG INJ SC/IM: CPT

## 2023-11-04 PROCEDURE — 2500000002 HC RX 250 W HCPCS SELF ADMINISTERED DRUGS (ALT 637 FOR MEDICARE OP, ALT 636 FOR OP/ED)

## 2023-11-04 RX ORDER — POLYETHYLENE GLYCOL 3350 17 G/17G
17 POWDER, FOR SOLUTION ORAL DAILY
Status: DISCONTINUED | OUTPATIENT
Start: 2023-11-04 | End: 2023-11-09 | Stop reason: HOSPADM

## 2023-11-04 RX ORDER — SENNOSIDES 8.6 MG/1
1 TABLET ORAL 2 TIMES DAILY
Status: DISCONTINUED | OUTPATIENT
Start: 2023-11-04 | End: 2023-11-08

## 2023-11-04 RX ORDER — POTASSIUM CHLORIDE 20 MEQ/1
20 TABLET, EXTENDED RELEASE ORAL ONCE
Status: COMPLETED | OUTPATIENT
Start: 2023-11-04 | End: 2023-11-04

## 2023-11-04 RX ADMIN — HEPARIN SODIUM 5000 UNITS: 5000 INJECTION INTRAVENOUS; SUBCUTANEOUS at 16:50

## 2023-11-04 RX ADMIN — SENNOSIDES 8.6 MG: 8.6 TABLET, FILM COATED ORAL at 13:37

## 2023-11-04 RX ADMIN — ACETAMINOPHEN 650 MG: 325 TABLET ORAL at 13:13

## 2023-11-04 RX ADMIN — IRON SUCROSE 200 MG: 20 INJECTION, SOLUTION INTRAVENOUS at 06:21

## 2023-11-04 RX ADMIN — HEPARIN SODIUM 5000 UNITS: 5000 INJECTION INTRAVENOUS; SUBCUTANEOUS at 09:41

## 2023-11-04 RX ADMIN — FUROSEMIDE 20 MG: 20 TABLET ORAL at 09:41

## 2023-11-04 RX ADMIN — POLYETHYLENE GLYCOL 3350 17 G: 17 POWDER, FOR SOLUTION ORAL at 13:37

## 2023-11-04 RX ADMIN — SENNOSIDES 8.6 MG: 8.6 TABLET, FILM COATED ORAL at 21:51

## 2023-11-04 RX ADMIN — DIGOXIN 62.5 MCG: 125 TABLET ORAL at 12:44

## 2023-11-04 RX ADMIN — ASPIRIN 81 MG: 81 TABLET, COATED ORAL at 09:41

## 2023-11-04 RX ADMIN — EZETIMIBE 10 MG: 10 TABLET ORAL at 09:40

## 2023-11-04 RX ADMIN — SOTALOL HYDROCHLORIDE 80 MG: 80 TABLET ORAL at 21:51

## 2023-11-04 RX ADMIN — POTASSIUM CHLORIDE 20 MEQ: 1500 TABLET, EXTENDED RELEASE ORAL at 21:51

## 2023-11-04 RX ADMIN — PANTOPRAZOLE SODIUM 40 MG: 40 TABLET, DELAYED RELEASE ORAL at 06:21

## 2023-11-04 RX ADMIN — HEPARIN SODIUM 5000 UNITS: 5000 INJECTION INTRAVENOUS; SUBCUTANEOUS at 01:33

## 2023-11-04 RX ADMIN — CLOPIDOGREL BISULFATE 75 MG: 75 TABLET ORAL at 09:41

## 2023-11-04 RX ADMIN — SOTALOL HYDROCHLORIDE 80 MG: 80 TABLET ORAL at 09:41

## 2023-11-04 ASSESSMENT — PAIN SCALES - GENERAL
PAINLEVEL_OUTOF10: 5 - MODERATE PAIN
PAINLEVEL_OUTOF10: 0 - NO PAIN

## 2023-11-04 ASSESSMENT — PAIN - FUNCTIONAL ASSESSMENT
PAIN_FUNCTIONAL_ASSESSMENT: 0-10

## 2023-11-04 NOTE — HOSPITAL COURSE
Javier Tate is an 88 year old male with hx of aortic stenosis who presented for scheduled TAVR on 10/30. Post procedure complicated by disorientation and aphasia. 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 Patient followed by neuro stroke while here with plans for dapt and vascular neurology follow up in 3 months. Patients warfarin being held I/s/o infarct. Stable for floor transfer.      Floor course:  POD 1 ECHO showed EF 60-65%, negative bubble study, triv AI, grad 12.1/6.0, mod MR (was mod/severe), mod TR, elevated RVSP, dilated IVC, and triv PC effusion. Continue to assess LAAO and PADMAJA candidacy as outpatient.    After discussions with neurology and structural heart, consensus decision on DAPT: with fresh PCI in July, it's recommended to continue DAPT until warfarin can be started 11/13 (2 weeks post stroke), then cont with Plavix + Warfarin & discontinue ASA 81.     On 11/6, RN reported acute urinary retention and hematuria. Flomax was initiation and minaya catheter was placed, draining 500cc. Urology was consulted which recommended a repeat trial of void at midnight on the day of discharge, if the patient voids without elevated PVR (>250cc or symptomatic) can be discharged without a catheter. Given that the patient just had catheter placement and is on Plavix with plans to transition to Warfarin, it is expected that there may be increasing waxing/waning hematuria. Patient with successful trial of void. Minaya catheter removed 11/8 and patient continued on Flomax. Hgb remained stable, 8.8 at time of discharge.     Transient leukocytosis with intermittent fevers, infectious work up negative. Lactate WNL, initial BC x2 and UA x2 negative. Worsening leukocytosis on 11/8, WBC 19.1, improved to 12.5 on day of discharge. Pt afebrile, denied chills, groin sites clean, no s/s of infection. Repeat BC x2 from 11/8 and UA/UC collected NGTD, pending final results.     Iron studies consistent  with ANTONIO, given IV iron while inpatient, continued on PO at discharge.    Issues with constipation, resolved prior to discharge s/p suppository.     Arthritic pain treated with topical gel, tylenol, lidocaine patches, and hot/cold packs.    Dr. Ortiz requested EP input on atrial fib med regimen: per Dr. Landry, EP Fellow, patient  continued on sotalol and digoxin discontinued. Referral made to follow up with EP at .     Patient was recommended for acute rehab after PT/OT eval. Patient/family agreeable.    Discharge weight: 73.5 kg    After all labs and VS were reviewed the decision was made that the patient was medically stable for discharge.  The patient was discharged in satisfactory condition.    More than 30 minutes were spent in coordinating patient discharge.

## 2023-11-04 NOTE — CARE PLAN
The patient's goals for the shift include      The clinical goals for the shift include pt will have controlled pain throughout shift\        Problem: Fall/Injury  Goal: Verbalize understanding of personal risk factors for fall in the hospital  Outcome: Not Progressing  Goal: Verbalize understanding of risk factor reduction measures to prevent injury from fall in the home  Outcome: Not Progressing  Goal: Use assistive devices by end of the shift  Outcome: Not Progressing

## 2023-11-04 NOTE — PROGRESS NOTES
Subjective Data:  Patient sitting up in chair, NAD. Reports he has not had a BM since admission, currently on the bedpan. Will add bowel regimen. Pending acute rehab placement, referrals pending responses. Tmax in last 24 hours was 37.9, patient denies chills, body aches, or diaphoresis. Denies cough or worsening sputum production. Will continue to follow-up blood cultures (no growth to date). UA negative.     - aphasia improving, alert and oriented X2-3  - BC with no growth to date, UA negative.   - Continue 20 mg PO lasix  - pending acute rehab placement    Overnight Events:    No acute events overnight     Objective Data:  Last Recorded Vitals:  Vitals:    11/03/23 2156 11/04/23 0118 11/04/23 0619 11/04/23 0955   BP: 133/53 125/54 135/74 127/62   BP Location: Right arm Right arm  Left arm   Patient Position: Lying Lying  Sitting   Pulse: 78 70 72 77   Resp: 18   17   Temp: 37.4 °C (99.3 °F) 36.8 °C (98.2 °F) 37.2 °C (99 °F) 36.9 °C (98.4 °F)   TempSrc: Temporal Temporal  Temporal   SpO2: 97% 94% 97% 93%   Weight:  72.6 kg (160 lb)     Height:           Last Labs:  Results for orders placed or performed during the hospital encounter of 10/30/23 (from the past 24 hour(s))   CBC   Result Value Ref Range    WBC 11.4 (H) 4.4 - 11.3 x10*3/uL    nRBC 0.0 0.0 - 0.0 /100 WBCs    RBC 2.86 (L) 4.50 - 5.90 x10*6/uL    Hemoglobin 8.9 (L) 13.5 - 17.5 g/dL    Hematocrit 28.7 (L) 41.0 - 52.0 %     80 - 100 fL    MCH 31.1 26.0 - 34.0 pg    MCHC 31.0 (L) 32.0 - 36.0 g/dL    RDW 13.3 11.5 - 14.5 %    Platelets 166 150 - 450 x10*3/uL   Renal function panel   Result Value Ref Range    Glucose 168 (H) 74 - 99 mg/dL    Sodium 138 136 - 145 mmol/L    Potassium 3.8 3.5 - 5.3 mmol/L    Chloride 102 98 - 107 mmol/L    Bicarbonate 28 21 - 32 mmol/L    Anion Gap 12 10 - 20 mmol/L    Urea Nitrogen 30 (H) 6 - 23 mg/dL    Creatinine 1.29 0.50 - 1.30 mg/dL    eGFR 54 (L) >60 mL/min/1.73m*2    Calcium 8.9 8.6 - 10.6 mg/dL    Phosphorus 2.6  "2.5 - 4.9 mg/dL    Albumin 3.2 (L) 3.4 - 5.0 g/dL   Magnesium   Result Value Ref Range    Magnesium 2.12 1.60 - 2.40 mg/dL   Protime-INR   Result Value Ref Range    Protime 14.7 (H) 9.8 - 12.8 seconds    INR 1.3 (H) 0.9 - 1.1        No results found for: \"TROPHS\", \"BNP\", \"HGBA1C\", \"LDLCALC\", \"VLDL\"   Last I/O:  I/O last 3 completed shifts:  In: 540 (7.4 mL/kg) [P.O.:540]  Out: 925 (12.7 mL/kg) [Urine:925 (0.4 mL/kg/hr)]  Weight: 72.6 kg     Past Cardiology Tests (Last 3 Years):    Echo:  Transthoracic Echo (TTE) Complete 10/31/2023   1. Left ventricular systolic function is normal with a 60-65% estimated ejection fraction.   2. Spectral Doppler shows a pseudonormal pattern of left ventricular diastolic filling.   3. Abnormal septal motion consistent with left bundle branch block.   4. The left atrium is severely dilated.   5. The right atrium is severely dilated.   6. There is moderate to severe mitral annular calcification.   7. Moderate mitral valve regurgitation.   8. Moderate tricuspid regurgitation visualized.   9. There is a transcatheter aortic valve replacement.  10. S/p 34mm Medtronic Evolut TAVR with gradients of 12.1/6mmHg and trace perivalvular AI. There is correct TAVR placement with trace periprosthetic regurgitation.  11. In comparison to study 10/30/2023, there has been slight increase in TAVR gradients, the EF has improved from previous 40-45%( mildly reduced) to now normal at 60-65%. The degreee of MR appears to have decreased somewhat from moderate to severe to at least moderate( proximal flow convergence appears a bit smaller today and there is not clear systolic flow reversal in the PVs which was seen yesterday.  12. Moderately elevated right ventricular systolic pressure.  13. Poorly visualized anatomical structures due to suboptimal image quality.  14. Moderate to severre MAC with mildly thickened MV leaflets with at least moderate centrally directed. Note that the degree of MR could be " underestamated due to shielding from the MAC. Note that the proximal flow convergence zone is not large.    Transthoracic Echo (TTE) Limited 10/30/2023   1. Left ventricular systolic function is mildly decreased with a 40-45% estimated ejection fraction.   2. There is global hypokinesis of the left ventricle with minor regional variations.   3. The left atrium is severely dilated.   4. Moderate to severe mitral valve regurgitation.   5. Severe aortic valve stenosis.   6. Mild to moderate aortic valve regurgitation.   7. There is moderate to severe aortic valve cusp calcification.   8. There is moderate to severe mitral annular calcification.   9. There is aortic valve annular calcification.  10. Moderate to severe tricuspid regurgitation visualized.  11. Moderately elevated right ventricular systolic pressure.  12. Post TAVR - There was an improvement in aortic transvalvular gradients (now mean gradient 3 mmHg, peak gradient 6 mmHg, DI 0.7). There is a mild-to moderate chel-prosthetic regurgitation with one anterior and one posterior jet (A<P).  13. No formal bubble study was performed however bubbles are seen in the LV and LA post TAVR suggesting presence of L-R shunt. Recommend formal bubble study.      Inpatient Medications:  Scheduled medications   Medication Dose Route Frequency    aspirin  81 mg oral Daily    clopidogrel  75 mg oral Daily    digoxin  62.5 mcg oral Daily    ezetimibe  10 mg oral Daily    [Held by provider] ferrous sulfate  1 tablet oral Daily with breakfast    furosemide  20 mg oral Daily    heparin (porcine)  5,000 Units subcutaneous q8h    iron sucrose  200 mg intravenous Daily    lidocaine  1 patch transdermal Daily    pantoprazole  40 mg oral Daily before breakfast    sotalol  80 mg oral BID     PRN medications   Medication    acetaminophen    Or    acetaminophen    acetaminophen    melatonin     Continuous Medications   Medication Dose Last Rate       Physical Exam:  Physical Exam  Vitals  reviewed.   Constitutional:       General: He is not in acute distress.     Appearance: Normal appearance. He is normal weight.   HENT:      Head: Atraumatic.      Mouth/Throat:      Mouth: Mucous membranes are moist.   Eyes:      General: No scleral icterus.     Extraocular Movements: Extraocular movements intact.   Neck:      Vascular: JVD (elevated to mid neck) present.   Cardiovascular:      Rate and Rhythm: Normal rate and regular rhythm.      Pulses: Normal pulses.      Heart sounds: Normal heart sounds.      Comments: V paced on tele    Pulmonary:      Effort: Pulmonary effort is normal. No respiratory distress.      Breath sounds: Decreased air movement present. No rales.   Abdominal:      General: Bowel sounds are normal. There is no distension.      Palpations: Abdomen is soft.      Tenderness: There is no abdominal tenderness. There is no guarding.   Musculoskeletal:         General: Deformity (contracted RUE) present.      Cervical back: Normal range of motion.      Right lower leg: No edema (trace/+1).      Left lower leg: No edema (trace/+1).   Skin:     General: Skin is warm and dry.      Capillary Refill: Capillary refill takes less than 2 seconds.      Comments: Bilateral groin sites stable, CHRISTIANO   Neurological:      Mental Status: He is alert.      Motor: Weakness (Right-sided) present.      Gait: Gait abnormal.      Comments: Oriented X2-3, occasionally aphasic   Psychiatric:         Behavior: Behavior normal.       Assessment/Plan   Javier Tate is an 88M w/ severe aortic stenosis s/p 10/30 TAVR p/w disorientation, expressive aphasia, receptive aphasia. 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 Toxic-metabolic encephalopathy and digoxin toxicity remains on the differential. Transferred to [facility] service for further management.      Left parietal lobe stroke  Disorientation  Receptive/ Expressive Aphasia  - confusion and RUE shaking/tremor noted post  procedure  - Neurology consulted, initial assessment showed mild aphasia and inability to hold legs up against gravity, no definitive evidence of stroke  -BAT called again for worsening dysmetria and aphasia.  NIHSS 5.  Neurology recommending brain imaging, lipid panel and HbA1c.   - CTA showed left parietal acute infarct.   - delirium precautions  - Neurology recommendations: no need for brain MRI, continue DAPT, follow up with vascular neurology in 3 mo, s/o.   - per Neurology, due to a high risk of bleeding, now rec Plavix monotherapy ( preference) until resuming anticoagulation at 2 wks post stroke   - structural heart made aware of neurology recs: with fresh PCI in July, its recommended to continue DAPT until warfarin can be started 11/13 (2 weeks post stroke), then cont with Plavix + Warfarin & discontinuing ASA 81  - Tylenol PRN for pain  - cont PT/OT/SLP Consult, OOB to chair as tolerated  - Sleep/wake cycle normalization    Severe Aortic Stenosis s/p TAVR   - 10/30/23 s/p TAVR - Evolut FX 34 mm  with Dr. Umana and Dr. Stern.   - Echo with bubbles in the LV during procedure which disappeared when IV medication stopped. Intraop echo showed mild PVL, no PC effusion, and resolution of severe AS.   - POD 1 ECHO showed EF 60-65%, negative bubble study, triv AI, grad 12.1/6.0, mod MR (was mod/severe), mod TR, elevated RVSP, dilated IVC, and triv PC effusion   - continue gentle diuresis with lasix 20mg PO  - per Structural Heart, cont DAPT with ASA 81 mg and Plavix 75 mg PO daily, restart Coumadin per neuro/stroke (2 weeks post stroke-- 11/13), then cont Plavix with Warfarin, NO TRIPLE THERAPY  - Continue to assess LAAO and PADMAJA candidacy as outpatient    Concern for infection  - ongoing borderline temps, tmax 37.9 C, warm to touch, WBC very slightly elevated to 11.4 X2  - BC x2 collected, no growth to date X1 day UA unremarkable  - cont to trend temps, CBC  - discussed repeat CXR today, but pt denies SOB/  cough/ sputum--> may consider if leukocytosis worsens  - will assess groin sites today with nursing (pt on bedpan during physical exam)    Afib  Sick Sinus Syndrome  hx of Second Degree AV block   - s/p pacemaker  - cont digoxin 62.5mcg PO every day, Dig level 0.7 (11/2)  - holding warfarin d/t potential hemorrhagic conversion of AC  - per Neurology, hold off on resuming anticoagulation until 2 wks post stroke (11/13)  - cont ASA 81 mg, Clopidogrel 75mg, sotalol 80mg PO BID    HTN  - SBP last 24 hrs: 110-150s  - holding home lisinopril I/s/o stroke, promote adequate perfusion   - consider anti- htn agents as needed    Anemia  - admit hgb: 11.9, hgb s/p TAVR 9.2  - hgb trend: 8.9 (7.8 8.6, 8.7)  - iron studies: iron 19, TIBC 284, ferritin 92, % sat 7  - Vit. B12/Folate wnl  - holding home PO iron  - initiated IV iron 200mg x 5 days (11/3-11/7)  - bowel regimen added iso constipation likely 2/2 iron supplements    GI ppx  - cont pantoprazole 40mg PO daily     Chronic Arthritis  - Family also noted concerns about his knees bilaterally, stating he has a history of arthritis (unsure if rheumatologic)  - has history of injections by orthopedics as an outpatient  - Family asked if patient can have his knees drained as this has previously helped with his ambulation. Patient initially denied pain of his knees, although when prompted by family, he endorsed some pain with palpation  - will continue to reassess knees daily--> no complaints today  - if concern for septic arthritis, will intervene while inpatient     Dispo  PT/OT rec Acute Rehab, pending placement    DVT ppx: subq Heparin    Seen and discussed with Dr. Randhawa    Peripheral IV 10/02/23 20 G Right Antecubital (Active)   Site Assessment Clean;Dry;Intact 11/02/23 1532   Dressing Status Clean;Dry 11/02/23 1532   Number of days: 31       Peripheral IV 10/30/23 20 G Right;Anterior Forearm (Active)   Site Assessment Clean;Dry;Intact 11/02/23 1532   Dressing Status  Clean;Dry 11/02/23 1532   Number of days: 3       Code Status:  Full Code    I spent 30 minutes in the professional and overall care of this patient.    Fanta Sorenson, APRN-CNP

## 2023-11-05 LAB
ALBUMIN SERPL BCP-MCNC: 3 G/DL (ref 3.4–5)
ANION GAP SERPL CALC-SCNC: 13 MMOL/L (ref 10–20)
ATRIAL RATE: 96 BPM
BUN SERPL-MCNC: 36 MG/DL (ref 6–23)
CALCIUM SERPL-MCNC: 8.8 MG/DL (ref 8.6–10.6)
CHLORIDE SERPL-SCNC: 103 MMOL/L (ref 98–107)
CO2 SERPL-SCNC: 27 MMOL/L (ref 21–32)
CREAT SERPL-MCNC: 1.15 MG/DL (ref 0.5–1.3)
ERYTHROCYTE [DISTWIDTH] IN BLOOD BY AUTOMATED COUNT: 13.6 % (ref 11.5–14.5)
GFR SERPL CREATININE-BSD FRML MDRD: 62 ML/MIN/1.73M*2
GLUCOSE SERPL-MCNC: 153 MG/DL (ref 74–99)
HCT VFR BLD AUTO: 28.6 % (ref 41–52)
HGB BLD-MCNC: 8.7 G/DL (ref 13.5–17.5)
INR PPP: 1.2 (ref 0.9–1.1)
MAGNESIUM SERPL-MCNC: 2.15 MG/DL (ref 1.6–2.4)
MCH RBC QN AUTO: 30.1 PG (ref 26–34)
MCHC RBC AUTO-ENTMCNC: 30.4 G/DL (ref 32–36)
MCV RBC AUTO: 99 FL (ref 80–100)
NRBC BLD-RTO: 0.2 /100 WBCS (ref 0–0)
PHOSPHATE SERPL-MCNC: 2.9 MG/DL (ref 2.5–4.9)
PLATELET # BLD AUTO: 175 X10*3/UL (ref 150–450)
POTASSIUM SERPL-SCNC: 3.6 MMOL/L (ref 3.5–5.3)
PROTHROMBIN TIME: 14 SECONDS (ref 9.8–12.8)
Q ONSET: 213 MS
QRS COUNT: 13 BEATS
QRS DURATION: 140 MS
QT INTERVAL: 428 MS
QTC CALCULATION(BAZETT): 481 MS
QTC FREDERICIA: 462 MS
R AXIS: -55 DEGREES
RBC # BLD AUTO: 2.89 X10*6/UL (ref 4.5–5.9)
SODIUM SERPL-SCNC: 139 MMOL/L (ref 136–145)
T AXIS: 103 DEGREES
T OFFSET: 427 MS
VENTRICULAR RATE: 76 BPM
WBC # BLD AUTO: 8.8 X10*3/UL (ref 4.4–11.3)

## 2023-11-05 PROCEDURE — 2500000001 HC RX 250 WO HCPCS SELF ADMINISTERED DRUGS (ALT 637 FOR MEDICARE OP)

## 2023-11-05 PROCEDURE — 2500000002 HC RX 250 W HCPCS SELF ADMINISTERED DRUGS (ALT 637 FOR MEDICARE OP, ALT 636 FOR OP/ED)

## 2023-11-05 PROCEDURE — 2500000004 HC RX 250 GENERAL PHARMACY W/ HCPCS (ALT 636 FOR OP/ED)

## 2023-11-05 PROCEDURE — 96372 THER/PROPH/DIAG INJ SC/IM: CPT

## 2023-11-05 PROCEDURE — 85027 COMPLETE CBC AUTOMATED: CPT

## 2023-11-05 PROCEDURE — 80069 RENAL FUNCTION PANEL: CPT

## 2023-11-05 PROCEDURE — 1200000002 HC GENERAL ROOM WITH TELEMETRY DAILY

## 2023-11-05 PROCEDURE — 99233 SBSQ HOSP IP/OBS HIGH 50: CPT | Performed by: INTERNAL MEDICINE

## 2023-11-05 PROCEDURE — 85610 PROTHROMBIN TIME: CPT

## 2023-11-05 PROCEDURE — 83735 ASSAY OF MAGNESIUM: CPT

## 2023-11-05 PROCEDURE — 36415 COLL VENOUS BLD VENIPUNCTURE: CPT

## 2023-11-05 RX ADMIN — SOTALOL HYDROCHLORIDE 80 MG: 80 TABLET ORAL at 21:06

## 2023-11-05 RX ADMIN — HEPARIN SODIUM 5000 UNITS: 5000 INJECTION INTRAVENOUS; SUBCUTANEOUS at 17:08

## 2023-11-05 RX ADMIN — EZETIMIBE 10 MG: 10 TABLET ORAL at 09:10

## 2023-11-05 RX ADMIN — ACETAMINOPHEN 650 MG: 325 TABLET ORAL at 04:58

## 2023-11-05 RX ADMIN — IRON SUCROSE 200 MG: 20 INJECTION, SOLUTION INTRAVENOUS at 06:02

## 2023-11-05 RX ADMIN — SENNOSIDES 8.6 MG: 8.6 TABLET, FILM COATED ORAL at 21:06

## 2023-11-05 RX ADMIN — SOTALOL HYDROCHLORIDE 80 MG: 80 TABLET ORAL at 09:09

## 2023-11-05 RX ADMIN — FUROSEMIDE 20 MG: 20 TABLET ORAL at 09:11

## 2023-11-05 RX ADMIN — DIGOXIN 62.5 MCG: 125 TABLET ORAL at 12:28

## 2023-11-05 RX ADMIN — HEPARIN SODIUM 5000 UNITS: 5000 INJECTION INTRAVENOUS; SUBCUTANEOUS at 09:10

## 2023-11-05 RX ADMIN — PANTOPRAZOLE SODIUM 40 MG: 40 TABLET, DELAYED RELEASE ORAL at 06:02

## 2023-11-05 RX ADMIN — ASPIRIN 81 MG: 81 TABLET, COATED ORAL at 09:10

## 2023-11-05 RX ADMIN — HEPARIN SODIUM 5000 UNITS: 5000 INJECTION INTRAVENOUS; SUBCUTANEOUS at 01:49

## 2023-11-05 RX ADMIN — CLOPIDOGREL BISULFATE 75 MG: 75 TABLET ORAL at 09:10

## 2023-11-05 ASSESSMENT — PAIN - FUNCTIONAL ASSESSMENT
PAIN_FUNCTIONAL_ASSESSMENT: 0-10

## 2023-11-05 ASSESSMENT — PAIN SCALES - GENERAL
PAINLEVEL_OUTOF10: 0 - NO PAIN
PAINLEVEL_OUTOF10: 0 - NO PAIN
PAINLEVEL_OUTOF10: 5 - MODERATE PAIN
PAINLEVEL_OUTOF10: 0 - NO PAIN
PAINLEVEL_OUTOF10: 0 - NO PAIN

## 2023-11-05 ASSESSMENT — COGNITIVE AND FUNCTIONAL STATUS - GENERAL
STANDING UP FROM CHAIR USING ARMS: A LOT
TOILETING: A LITTLE
MOBILITY SCORE: 13
TURNING FROM BACK TO SIDE WHILE IN FLAT BAD: A LOT
DRESSING REGULAR LOWER BODY CLOTHING: A LITTLE
DRESSING REGULAR UPPER BODY CLOTHING: A LITTLE
PERSONAL GROOMING: A LITTLE
CLIMB 3 TO 5 STEPS WITH RAILING: A LOT
DAILY ACTIVITIY SCORE: 17
HELP NEEDED FOR BATHING: A LOT
WALKING IN HOSPITAL ROOM: A LOT
MOVING TO AND FROM BED TO CHAIR: A LOT
EATING MEALS: A LITTLE
MOVING FROM LYING ON BACK TO SITTING ON SIDE OF FLAT BED WITH BEDRAILS: A LITTLE

## 2023-11-05 NOTE — CARE PLAN
Problem: Pain - Adult  Goal: Verbalizes/displays adequate comfort level or baseline comfort level  Outcome: Progressing   The patient's goals for the shift include      The clinical goals for the shift include Pt will remain HDS      Problem: Safety - Adult  Goal: Free from fall injury  Outcome: Progressing     Problem: Discharge Planning  Goal: Discharge to home or other facility with appropriate resources  Outcome: Progressing     Problem: Chronic Conditions and Co-morbidities  Goal: Patient's chronic conditions and co-morbidity symptoms are monitored and maintained or improved  Outcome: Progressing     Problem: Skin  Goal: Decreased wound size/increased tissue granulation at next dressing change  Outcome: Progressing  Goal: Participates in plan/prevention/treatment measures  Outcome: Progressing  Goal: Prevent/manage excess moisture  Outcome: Progressing  Goal: Prevent/minimize sheer/friction injuries  Outcome: Progressing  Goal: Promote/optimize nutrition  Outcome: Progressing  Goal: Promote skin healing  Outcome: Progressing

## 2023-11-05 NOTE — PROGRESS NOTES
Subjective Data:  Patient sitting up in chair, NAD. . Expressive aphasia improving, it appears that receptive aphasia persists. Attempts to follow commands but is unable to always complete tasks (unable to bring finger to nose on exam). Patient denies visual disturbances/ deficits. Tearful when unable to complete tasks but team provided assurance that he is showing improvement. Encouraging PO intake. Pending acute rehab placement, referrals pending responses    - expressive aphasia improving, receptive aphasia persists with difficult completing some tasks  - PT rec'd acute rehab--> pending placement decisions and TCC update today    Overnight Events:    No acute events overnight     Objective Data:  Last Recorded Vitals:  Vitals:    11/04/23 2149 11/05/23 0137 11/05/23 0558 11/05/23 0943   BP: 119/61 119/60 106/63 125/82   BP Location: Left arm Right arm Left arm Left arm   Patient Position: Lying Lying Lying Lying   Pulse: 76 72 74 72   Resp: 16 16 18 18   Temp: 37.4 °C (99.3 °F) 37.2 °C (99 °F) 36.8 °C (98.2 °F) 36.6 °C (97.9 °F)   TempSrc: Temporal Temporal Temporal Temporal   SpO2: 96% 96% 96% 98%   Weight:       Height:           Last Labs:  Results for orders placed or performed during the hospital encounter of 10/30/23 (from the past 24 hour(s))   CBC   Result Value Ref Range    WBC 10.3 4.4 - 11.3 x10*3/uL    nRBC 0.0 0.0 - 0.0 /100 WBCs    RBC 2.71 (L) 4.50 - 5.90 x10*6/uL    Hemoglobin 8.4 (L) 13.5 - 17.5 g/dL    Hematocrit 26.8 (L) 41.0 - 52.0 %    MCV 99 80 - 100 fL    MCH 31.0 26.0 - 34.0 pg    MCHC 31.3 (L) 32.0 - 36.0 g/dL    RDW 13.4 11.5 - 14.5 %    Platelets 156 150 - 450 x10*3/uL   Protime-INR   Result Value Ref Range    Protime 16.1 (H) 9.8 - 12.8 seconds    INR 1.4 (H) 0.9 - 1.1   Renal function panel   Result Value Ref Range    Glucose 148 (H) 74 - 99 mg/dL    Sodium 138 136 - 145 mmol/L    Potassium 3.7 3.5 - 5.3 mmol/L    Chloride 103 98 - 107 mmol/L    Bicarbonate 26 21 - 32 mmol/L    Anion  "Gap 13 10 - 20 mmol/L    Urea Nitrogen 33 (H) 6 - 23 mg/dL    Creatinine 1.11 0.50 - 1.30 mg/dL    eGFR 65 >60 mL/min/1.73m*2    Calcium 8.8 8.6 - 10.6 mg/dL    Phosphorus 2.8 2.5 - 4.9 mg/dL    Albumin 2.9 (L) 3.4 - 5.0 g/dL   Magnesium   Result Value Ref Range    Magnesium 2.10 1.60 - 2.40 mg/dL        No results found for: \"TROPHS\", \"BNP\", \"HGBA1C\", \"LDLCALC\", \"VLDL\"   Last I/O:  I/O last 3 completed shifts:  In: 540 (7.4 mL/kg) [P.O.:540]  Out: 325 (4.5 mL/kg) [Urine:325 (0.1 mL/kg/hr)]  Weight: 72.6 kg     Past Cardiology Tests (Last 3 Years):    Echo:  Transthoracic Echo (TTE) Complete 10/31/2023   1. Left ventricular systolic function is normal with a 60-65% estimated ejection fraction.   2. Spectral Doppler shows a pseudonormal pattern of left ventricular diastolic filling.   3. Abnormal septal motion consistent with left bundle branch block.   4. The left atrium is severely dilated.   5. The right atrium is severely dilated.   6. There is moderate to severe mitral annular calcification.   7. Moderate mitral valve regurgitation.   8. Moderate tricuspid regurgitation visualized.   9. There is a transcatheter aortic valve replacement.  10. S/p 34mm Medtronic Evolut TAVR with gradients of 12.1/6mmHg and trace perivalvular AI. There is correct TAVR placement with trace periprosthetic regurgitation.  11. In comparison to study 10/30/2023, there has been slight increase in TAVR gradients, the EF has improved from previous 40-45%( mildly reduced) to now normal at 60-65%. The degreee of MR appears to have decreased somewhat from moderate to severe to at least moderate( proximal flow convergence appears a bit smaller today and there is not clear systolic flow reversal in the PVs which was seen yesterday.  12. Moderately elevated right ventricular systolic pressure.  13. Poorly visualized anatomical structures due to suboptimal image quality.  14. 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 Note that the degree of MR could be underestamated due to shielding from the MAC. Note that the proximal flow convergence zone is not large.    Transthoracic Echo (TTE) Limited 10/30/2023   1. Left ventricular systolic function is mildly decreased with a 40-45% estimated ejection fraction.   2. There is global hypokinesis of the left ventricle with minor regional variations.   3. The left atrium is severely dilated.   4. Moderate to severe mitral valve regurgitation.   5. Severe aortic valve stenosis.   6. Mild to moderate aortic valve regurgitation.   7. There is moderate to severe aortic valve cusp calcification.   8. There is moderate to severe mitral annular calcification.   9. There is aortic valve annular calcification.  10. Moderate to severe tricuspid regurgitation visualized.  11. Moderately elevated right ventricular systolic pressure.  12. Post TAVR - There was an improvement in aortic transvalvular gradients (now mean gradient 3 mmHg, peak gradient 6 mmHg, DI 0.7). There is a mild-to moderate chel-prosthetic regurgitation with one anterior and one posterior jet (A<P).  13. No formal bubble study was performed however bubbles are seen in the LV and LA post TAVR suggesting presence of L-R shunt. Recommend formal bubble study.      Inpatient Medications:  Scheduled medications   Medication Dose Route Frequency    aspirin  81 mg oral Daily    clopidogrel  75 mg oral Daily    digoxin  62.5 mcg oral Daily    ezetimibe  10 mg oral Daily    [Held by provider] ferrous sulfate  1 tablet oral Daily with breakfast    furosemide  20 mg oral Daily    heparin (porcine)  5,000 Units subcutaneous q8h    iron sucrose  200 mg intravenous Daily    lidocaine  1 patch transdermal Daily    pantoprazole  40 mg oral Daily before breakfast    polyethylene glycol  17 g oral Daily    sennosides  1 tablet oral BID    sotalol  80 mg oral BID     PRN medications   Medication    acetaminophen    melatonin      Continuous Medications   Medication Dose Last Rate     Physical Exam:  GENERAL: expressive aphasia (improving), alert and oriented X2-3, tearful   EYES: EOMI  NECK: unable to appreciate JVD sitting at 90 degrees  LUNGS: posterior bases diminished, on room air  CARDIAC: irregularly irregular; V paced and intermittent Afib on tele; no murmur  ABDOMEN: nondistended, nontender, +BS  EXTREMITIES: trace LE edema; RUE with reduced ROM   SKIN: right groin site CHRISTIANO, no oozing or hematoma; excoriation and reddened; left groin site CHRISTIANO, intact with ecchymosis but no hematoma  NEURO: receptive aphasia, uncoordinated movements with tasks; unable to complete finger to nose test as appeared to not understand the requests    Assessment/Plan   Javier Tate is an 88M w/ severe aortic stenosis s/p 10/30 TAVR p/w disorientation, expressive aphasia, receptive aphasia. 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 Toxic-metabolic encephalopathy and digoxin toxicity remains on the differential. Transferred to HVI service for further management.      Left parietal lobe stroke  Disorientation  Receptive/ Expressive Aphasia  - confusion and RUE shaking/tremor noted post procedure  - Neurology consulted, initial assessment showed mild aphasia and inability to hold legs up against gravity, no definitive evidence of stroke  -BAT called again for worsening dysmetria and aphasia.  NIHSS 5.  Neurology recommending brain imaging, lipid panel and HbA1c.   - CTA showed left parietal acute infarct.   - delirium precautions  - Neurology recommendations: no need for brain MRI, continue DAPT, follow up with vascular neurology in 3 mo, s/o.   - per Neurology, due to a high risk of bleeding, now rec Plavix monotherapy ( preference) until resuming anticoagulation at 2 wks post stroke   - structural heart made aware of neurology recs: with fresh PCI in July, its recommended to continue DAPT until warfarin can  be started 11/13 (2 weeks post stroke), then cont with Plavix + Warfarin & discontinuing ASA 81  - Tylenol PRN for pain  - cont PT/OT/SLP Consult, OOB to chair as tolerated  - Sleep/wake cycle normalization    Severe Aortic Stenosis s/p TAVR   - 10/30/23 s/p TAVR - Evolut FX 34 mm  with Dr. Umana and Dr. Stern.   - Echo with bubbles in the LV during procedure which disappeared when IV medication stopped. Intraop echo showed mild PVL, no PC effusion, and resolution of severe AS.   - POD 1 ECHO showed EF 60-65%, negative bubble study, triv AI, grad 12.1/6.0, mod MR (was mod/severe), mod TR, elevated RVSP, dilated IVC, and triv PC effusion   - continue gentle diuresis with lasix 20mg PO  - per Structural Heart, cont DAPT with ASA 81 mg and Plavix 75 mg PO daily, restart Coumadin per neuro/stroke (2 weeks post stroke-- 11/13), then cont Plavix with Warfarin, NO TRIPLE THERAPY  - Continue to assess LAAO and PADMAJA candidacy as outpatient    Concern for infection  Leukocytosis- resolving   - ongoing borderline temps, tmax 37.9 C, warm to touch  - Tmax last 24 hours: 37.2  - WBC very slightly elevated to 11.4 X2--> down to 10.3 today  - BC x2 collected, no growth to date X2 days,  UA unremarkable  - cont to trend temps, CBC  - discussed repeat CXR, but pt denies SOB/ cough/ sputum--> deferred for now  - groins sites without S/S infection    Afib  Sick Sinus Syndrome  hx of Second Degree AV block   - s/p pacemaker  - cont digoxin 62.5mcg PO every day, Dig level 0.7 (11/2)  - holding warfarin d/t potential hemorrhagic conversion of AC  - per Neurology, hold off on resuming anticoagulation until 2 wks post stroke (11/13)  - cont ASA 81 mg, Clopidogrel 75mg, sotalol 80mg PO BID    HTN  - SBP last 24 hrs: 106- 131  - holding home lisinopril I/s/o stroke, promote adequate perfusion   - consider anti- htn agents as needed    Anemia  - admit hgb: 11.9, hgb s/p TAVR 9.2  - hgb trend: 8.4 (8.9, 7.8 8.6, 8.7)  - iron studies: iron  19, TIBC 284, ferritin 92, % sat 7  - Vit. B12/Folate wnl  - holding home PO iron  - initiated IV iron 200mg x 5 days (11/3-11/7)  - bowel regimen added iso constipation likely 2/2 iron supplements    GI ppx  - cont pantoprazole 40mg PO daily     Chronic Arthritis  - Family also noted concerns about his knees bilaterally, stating he has a history of arthritis (unsure if rheumatologic)  - has history of injections by orthopedics as an outpatient  - Family asked if patient can have his knees drained as this has previously helped with his ambulation. Patient initially denied pain of his knees, although when prompted by family, he endorsed some pain with palpation  - will continue to reassess knees daily--> no complaints today  - if concern for septic arthritis, will intervene while inpatient     Dispo  PT rec Acute Rehab, pending placement decisions and TCC update today    DVT ppx: subq Heparin    Seen and discussed with Dr. Randhawa    Peripheral IV 10/02/23 20 G Right Antecubital (Active)   Site Assessment Clean;Dry;Intact 11/02/23 1532   Dressing Status Clean;Dry 11/02/23 1532   Number of days: 31       Peripheral IV 10/30/23 20 G Right;Anterior Forearm (Active)   Site Assessment Clean;Dry;Intact 11/02/23 1532   Dressing Status Clean;Dry 11/02/23 1532   Number of days: 3       Code Status:  Full Code    I spent 30 minutes in the professional and overall care of this patient.    Fanta Sorenson, APRN-CNP

## 2023-11-05 NOTE — PROGRESS NOTES
"Javier Tate is a 88 y.o. male on day 6 of admission presenting with Nonrheumatic aortic valve stenosis.  Met patient/family to discuss FOC.  [facility] is the [facility].  Updated clinicals submitted.  Will need updated PT/OT.      Physical Exam    Last Recorded Vitals  Blood pressure 104/57, pulse 71, temperature 36.4 °C (97.5 °F), temperature source Temporal, resp. rate 18, height 1.702 m (5' 7\"), weight 72.6 kg (160 lb), SpO2 97 %.  Intake/Output last 3 Shifts:  I/O last 3 completed shifts:  In: 540 (7.4 mL/kg) [P.O.:540]  Out: 325 (4.5 mL/kg) [Urine:325 (0.1 mL/kg/hr)]  Weight: 72.6 kg         Assessment/Plan   Principal Problem:    Nonrheumatic aortic valve stenosis  Active Problems:    Severe aortic stenosis    S/p TAVR (transcatheter aortic valve replacement), bioprosthetic         Chase Bey RN      "

## 2023-11-06 ENCOUNTER — APPOINTMENT (OUTPATIENT)
Dept: RADIOLOGY | Facility: HOSPITAL | Age: 88
DRG: 266 | End: 2023-11-06
Payer: MEDICARE

## 2023-11-06 LAB
ACT BLD: 276 SEC (ref 89–169)
ALBUMIN SERPL BCP-MCNC: 2.9 G/DL (ref 3.4–5)
ANION GAP SERPL CALC-SCNC: 15 MMOL/L (ref 10–20)
APPEARANCE UR: CLEAR
BACTERIA #/AREA URNS AUTO: ABNORMAL /HPF
BACTERIA BLD CULT: NORMAL
BACTERIA BLD CULT: NORMAL
BILIRUB UR STRIP.AUTO-MCNC: NEGATIVE MG/DL
BUN SERPL-MCNC: 34 MG/DL (ref 6–23)
CALCIUM SERPL-MCNC: 8.4 MG/DL (ref 8.6–10.6)
CHLORIDE SERPL-SCNC: 100 MMOL/L (ref 98–107)
CO2 SERPL-SCNC: 27 MMOL/L (ref 21–32)
COLOR UR: YELLOW
CREAT SERPL-MCNC: 1.07 MG/DL (ref 0.5–1.3)
ERYTHROCYTE [DISTWIDTH] IN BLOOD BY AUTOMATED COUNT: 13.7 % (ref 11.5–14.5)
GFR SERPL CREATININE-BSD FRML MDRD: 68 ML/MIN/1.73M*2
GLUCOSE SERPL-MCNC: 212 MG/DL (ref 74–99)
GLUCOSE UR STRIP.AUTO-MCNC: NEGATIVE MG/DL
HCT VFR BLD AUTO: 29.5 % (ref 41–52)
HGB BLD-MCNC: 8.8 G/DL (ref 13.5–17.5)
INR PPP: 1.2 (ref 0.9–1.1)
KETONES UR STRIP.AUTO-MCNC: NEGATIVE MG/DL
LEUKOCYTE ESTERASE UR QL STRIP.AUTO: NEGATIVE
MAGNESIUM SERPL-MCNC: 2.12 MG/DL (ref 1.6–2.4)
MCH RBC QN AUTO: 30.1 PG (ref 26–34)
MCHC RBC AUTO-ENTMCNC: 29.8 G/DL (ref 32–36)
MCV RBC AUTO: 101 FL (ref 80–100)
MUCOUS THREADS #/AREA URNS AUTO: ABNORMAL /LPF
NITRITE UR QL STRIP.AUTO: NEGATIVE
NRBC BLD-RTO: 0 /100 WBCS (ref 0–0)
PH UR STRIP.AUTO: 6 [PH]
PHOSPHATE SERPL-MCNC: 2.5 MG/DL (ref 2.5–4.9)
PLATELET # BLD AUTO: 196 X10*3/UL (ref 150–450)
POTASSIUM SERPL-SCNC: 4 MMOL/L (ref 3.5–5.3)
PROT UR STRIP.AUTO-MCNC: NEGATIVE MG/DL
PROTHROMBIN TIME: 13.4 SECONDS (ref 9.8–12.8)
RBC # BLD AUTO: 2.92 X10*6/UL (ref 4.5–5.9)
RBC # UR STRIP.AUTO: ABNORMAL /UL
RBC #/AREA URNS AUTO: ABNORMAL /HPF
SODIUM SERPL-SCNC: 138 MMOL/L (ref 136–145)
SP GR UR STRIP.AUTO: 1.02
UROBILINOGEN UR STRIP.AUTO-MCNC: 4 MG/DL
WBC # BLD AUTO: 8.9 X10*3/UL (ref 4.4–11.3)
WBC #/AREA URNS AUTO: ABNORMAL /HPF

## 2023-11-06 PROCEDURE — 2500000005 HC RX 250 GENERAL PHARMACY W/O HCPCS

## 2023-11-06 PROCEDURE — 2500000001 HC RX 250 WO HCPCS SELF ADMINISTERED DRUGS (ALT 637 FOR MEDICARE OP)

## 2023-11-06 PROCEDURE — 2500000004 HC RX 250 GENERAL PHARMACY W/ HCPCS (ALT 636 FOR OP/ED)

## 2023-11-06 PROCEDURE — 36415 COLL VENOUS BLD VENIPUNCTURE: CPT

## 2023-11-06 PROCEDURE — 2500000001 HC RX 250 WO HCPCS SELF ADMINISTERED DRUGS (ALT 637 FOR MEDICARE OP): Performed by: STUDENT IN AN ORGANIZED HEALTH CARE EDUCATION/TRAINING PROGRAM

## 2023-11-06 PROCEDURE — 74018 RADEX ABDOMEN 1 VIEW: CPT | Performed by: RADIOLOGY

## 2023-11-06 PROCEDURE — 83036 HEMOGLOBIN GLYCOSYLATED A1C: CPT

## 2023-11-06 PROCEDURE — 1200000002 HC GENERAL ROOM WITH TELEMETRY DAILY

## 2023-11-06 PROCEDURE — 85610 PROTHROMBIN TIME: CPT

## 2023-11-06 PROCEDURE — 83735 ASSAY OF MAGNESIUM: CPT

## 2023-11-06 PROCEDURE — 2500000004 HC RX 250 GENERAL PHARMACY W/ HCPCS (ALT 636 FOR OP/ED): Performed by: NURSE PRACTITIONER

## 2023-11-06 PROCEDURE — 97530 THERAPEUTIC ACTIVITIES: CPT | Mod: GO

## 2023-11-06 PROCEDURE — 81001 URINALYSIS AUTO W/SCOPE: CPT

## 2023-11-06 PROCEDURE — 96372 THER/PROPH/DIAG INJ SC/IM: CPT

## 2023-11-06 PROCEDURE — 2500000002 HC RX 250 W HCPCS SELF ADMINISTERED DRUGS (ALT 637 FOR MEDICARE OP, ALT 636 FOR OP/ED)

## 2023-11-06 PROCEDURE — 85027 COMPLETE CBC AUTOMATED: CPT

## 2023-11-06 PROCEDURE — 74018 RADEX ABDOMEN 1 VIEW: CPT | Mod: FY

## 2023-11-06 PROCEDURE — 99233 SBSQ HOSP IP/OBS HIGH 50: CPT | Performed by: INTERNAL MEDICINE

## 2023-11-06 PROCEDURE — 97530 THERAPEUTIC ACTIVITIES: CPT | Mod: GP

## 2023-11-06 PROCEDURE — 80069 RENAL FUNCTION PANEL: CPT

## 2023-11-06 PROCEDURE — 99221 1ST HOSP IP/OBS SF/LOW 40: CPT

## 2023-11-06 RX ORDER — TAMSULOSIN HYDROCHLORIDE 0.4 MG/1
0.4 CAPSULE ORAL DAILY
Status: DISCONTINUED | OUTPATIENT
Start: 2023-11-06 | End: 2023-11-09 | Stop reason: HOSPADM

## 2023-11-06 RX ORDER — DICLOFENAC SODIUM 10 MG/G
4 GEL TOPICAL 4 TIMES DAILY PRN
Status: DISCONTINUED | OUTPATIENT
Start: 2023-11-06 | End: 2023-11-09 | Stop reason: HOSPADM

## 2023-11-06 RX ORDER — LIDOCAINE HYDROCHLORIDE 20 MG/ML
1 JELLY TOPICAL AS NEEDED
Status: DISCONTINUED | OUTPATIENT
Start: 2023-11-06 | End: 2023-11-09 | Stop reason: HOSPADM

## 2023-11-06 RX ORDER — BISACODYL 5 MG
5 TABLET, DELAYED RELEASE (ENTERIC COATED) ORAL ONCE
Status: COMPLETED | OUTPATIENT
Start: 2023-11-06 | End: 2023-11-06

## 2023-11-06 RX ADMIN — HEPARIN SODIUM 5000 UNITS: 5000 INJECTION INTRAVENOUS; SUBCUTANEOUS at 17:41

## 2023-11-06 RX ADMIN — IRON SUCROSE 200 MG: 20 INJECTION, SOLUTION INTRAVENOUS at 06:53

## 2023-11-06 RX ADMIN — CLOPIDOGREL BISULFATE 75 MG: 75 TABLET ORAL at 09:29

## 2023-11-06 RX ADMIN — Medication 5 MG: at 20:06

## 2023-11-06 RX ADMIN — EZETIMIBE 10 MG: 10 TABLET ORAL at 09:29

## 2023-11-06 RX ADMIN — ASPIRIN 81 MG: 81 TABLET, COATED ORAL at 09:29

## 2023-11-06 RX ADMIN — POLYETHYLENE GLYCOL 3350 17 G: 17 POWDER, FOR SOLUTION ORAL at 06:43

## 2023-11-06 RX ADMIN — SENNOSIDES 8.6 MG: 8.6 TABLET, FILM COATED ORAL at 09:29

## 2023-11-06 RX ADMIN — Medication: at 14:01

## 2023-11-06 RX ADMIN — SOTALOL HYDROCHLORIDE 80 MG: 80 TABLET ORAL at 20:06

## 2023-11-06 RX ADMIN — ACETAMINOPHEN 650 MG: 325 TABLET ORAL at 06:53

## 2023-11-06 RX ADMIN — HEPARIN SODIUM 5000 UNITS: 5000 INJECTION INTRAVENOUS; SUBCUTANEOUS at 09:29

## 2023-11-06 RX ADMIN — ACETAMINOPHEN 650 MG: 325 TABLET ORAL at 00:41

## 2023-11-06 RX ADMIN — SENNOSIDES 8.6 MG: 8.6 TABLET, FILM COATED ORAL at 20:06

## 2023-11-06 RX ADMIN — SOTALOL HYDROCHLORIDE 80 MG: 80 TABLET ORAL at 09:29

## 2023-11-06 RX ADMIN — FUROSEMIDE 20 MG: 20 TABLET ORAL at 09:29

## 2023-11-06 RX ADMIN — DIGOXIN 62.5 MCG: 125 TABLET ORAL at 11:27

## 2023-11-06 RX ADMIN — ACETAMINOPHEN 650 MG: 325 TABLET ORAL at 20:07

## 2023-11-06 RX ADMIN — DICLOFENAC SODIUM 1 APPLICATION: 10 GEL TOPICAL at 14:01

## 2023-11-06 RX ADMIN — ACETAMINOPHEN 650 MG: 325 TABLET ORAL at 14:00

## 2023-11-06 RX ADMIN — PANTOPRAZOLE SODIUM 40 MG: 40 TABLET, DELAYED RELEASE ORAL at 09:29

## 2023-11-06 RX ADMIN — HEPARIN SODIUM 5000 UNITS: 5000 INJECTION INTRAVENOUS; SUBCUTANEOUS at 00:41

## 2023-11-06 RX ADMIN — LIDOCAINE 1 PATCH: 4 PATCH TOPICAL at 09:30

## 2023-11-06 RX ADMIN — BISACODYL 5 MG: 5 TABLET, COATED ORAL at 09:28

## 2023-11-06 RX ADMIN — TAMSULOSIN HYDROCHLORIDE 0.4 MG: 0.4 CAPSULE ORAL at 11:27

## 2023-11-06 ASSESSMENT — PAIN SCALES - GENERAL
PAINLEVEL_OUTOF10: 3
PAINLEVEL_OUTOF10: 1
PAINLEVEL_OUTOF10: 5 - MODERATE PAIN
PAINLEVEL_OUTOF10: 0 - NO PAIN
PAINLEVEL_OUTOF10: 0 - NO PAIN
PAINLEVEL_OUTOF10: 6
PAINLEVEL_OUTOF10: 0 - NO PAIN

## 2023-11-06 ASSESSMENT — COGNITIVE AND FUNCTIONAL STATUS - GENERAL
PERSONAL GROOMING: A LITTLE
WALKING IN HOSPITAL ROOM: TOTAL
DRESSING REGULAR LOWER BODY CLOTHING: A LOT
TOILETING: A LOT
CLIMB 3 TO 5 STEPS WITH RAILING: TOTAL
EATING MEALS: A LITTLE
DAILY ACTIVITIY SCORE: 13
TURNING FROM BACK TO SIDE WHILE IN FLAT BAD: A LOT
MOVING FROM LYING ON BACK TO SITTING ON SIDE OF FLAT BED WITH BEDRAILS: A LOT
PERSONAL GROOMING: A LITTLE
STANDING UP FROM CHAIR USING ARMS: TOTAL
HELP NEEDED FOR BATHING: A LOT
MOVING TO AND FROM BED TO CHAIR: TOTAL
STANDING UP FROM CHAIR USING ARMS: TOTAL
HELP NEEDED FOR BATHING: A LOT
CLIMB 3 TO 5 STEPS WITH RAILING: TOTAL
DRESSING REGULAR UPPER BODY CLOTHING: A LOT
DRESSING REGULAR UPPER BODY CLOTHING: A LOT
EATING MEALS: A LITTLE
WALKING IN HOSPITAL ROOM: TOTAL
MOBILITY SCORE: 8
DAILY ACTIVITIY SCORE: 14
MOVING TO AND FROM BED TO CHAIR: TOTAL
TOILETING: TOTAL
TURNING FROM BACK TO SIDE WHILE IN FLAT BAD: A LOT
MOBILITY SCORE: 8
MOVING FROM LYING ON BACK TO SITTING ON SIDE OF FLAT BED WITH BEDRAILS: A LOT
DRESSING REGULAR LOWER BODY CLOTHING: A LOT

## 2023-11-06 ASSESSMENT — PAIN DESCRIPTION - DESCRIPTORS: DESCRIPTORS: ACHING

## 2023-11-06 ASSESSMENT — ACTIVITIES OF DAILY LIVING (ADL)
BATHING_WHERE_ASSESSED: EDGE OF BED
BATHING_LEVEL_OF_ASSISTANCE: MAXIMUM ASSISTANCE

## 2023-11-06 ASSESSMENT — PAIN - FUNCTIONAL ASSESSMENT
PAIN_FUNCTIONAL_ASSESSMENT: 0-10

## 2023-11-06 NOTE — PROGRESS NOTES
Subjective Data:  Patient laying flat in bed, mildly distressed following minaya cath placement. When asked how he's doing, he notes that he's improving. Pending acute rehab placement, referrals pending responses.     - lower abdominal pain overnight, was suspected constipation. KUB completed, pending read  - RN reported new dysuria and hematuria this morning. PVR 472cc. Minaya catheter placed, repeat UA obtained and Flomax initiated   - urology consulted for new urinary retention and hematuria: common for patient's post op with neurological changes to develop new urinary retention. Recommended trial void prior to discharge date. If patient fails trial void, minaya cath to be replaced and follow up as an outpatient. Pending assessment of new hematuria.   - Dr. Ortiz requested EP input on atrial fib med regimen: per Dr. Landry, recommend continuing sotalol and digoxin as this was initiated as an outpatient and renal function remains stable  - expressive aphasia improving, receptive aphasia persists with difficult completing some tasks  - pending updated PT/OT recs for AR placement  - liable weights recorded since admission, patient unable to bear weight on scale, weights likely skewed due to being bed weights    Overnight Events:    Lower abdominal pain reported overnight, suspected constipation. KUB obtained and bowel regimen ordered.     Objective Data:  Last Recorded Vitals:  Vitals:    11/06/23 0141 11/06/23 0621 11/06/23 0944 11/06/23 1037   BP: 117/73 131/71  136/64   BP Location: Right arm Left arm     Patient Position: Lying Lying     Pulse: 74 80  69   Resp: 18 18  20   Temp: 37.1 °C (98.8 °F) 36.7 °C (98.1 °F)  36.4 °C (97.5 °F)   TempSrc: Temporal Temporal     SpO2: 94% 97%  98%   Weight:   69.3 kg (152 lb 12.8 oz)    Height:           Last Labs:  Results for orders placed or performed during the hospital encounter of 10/30/23 (from the past 24 hour(s))   CBC   Result Value Ref Range    WBC 8.8 4.4 - 11.3  "x10*3/uL    nRBC 0.2 (H) 0.0 - 0.0 /100 WBCs    RBC 2.89 (L) 4.50 - 5.90 x10*6/uL    Hemoglobin 8.7 (L) 13.5 - 17.5 g/dL    Hematocrit 28.6 (L) 41.0 - 52.0 %    MCV 99 80 - 100 fL    MCH 30.1 26.0 - 34.0 pg    MCHC 30.4 (L) 32.0 - 36.0 g/dL    RDW 13.6 11.5 - 14.5 %    Platelets 175 150 - 450 x10*3/uL   Renal function panel   Result Value Ref Range    Glucose 153 (H) 74 - 99 mg/dL    Sodium 139 136 - 145 mmol/L    Potassium 3.6 3.5 - 5.3 mmol/L    Chloride 103 98 - 107 mmol/L    Bicarbonate 27 21 - 32 mmol/L    Anion Gap 13 10 - 20 mmol/L    Urea Nitrogen 36 (H) 6 - 23 mg/dL    Creatinine 1.15 0.50 - 1.30 mg/dL    eGFR 62 >60 mL/min/1.73m*2    Calcium 8.8 8.6 - 10.6 mg/dL    Phosphorus 2.9 2.5 - 4.9 mg/dL    Albumin 3.0 (L) 3.4 - 5.0 g/dL   Magnesium   Result Value Ref Range    Magnesium 2.15 1.60 - 2.40 mg/dL   Protime-INR   Result Value Ref Range    Protime 14.0 (H) 9.8 - 12.8 seconds    INR 1.2 (H) 0.9 - 1.1   Urinalysis with Reflex Microscopic   Result Value Ref Range    Color, Urine Yellow Straw, Yellow    Appearance, Urine Clear Clear    Specific Gravity, Urine 1.020 1.005 - 1.035    pH, Urine 6.0 5.0, 5.5, 6.0, 6.5, 7.0, 7.5, 8.0    Protein, Urine NEGATIVE NEGATIVE mg/dL    Glucose, Urine NEGATIVE NEGATIVE mg/dL    Blood, Urine SMALL (1+) (A) NEGATIVE    Ketones, Urine NEGATIVE NEGATIVE mg/dL    Bilirubin, Urine NEGATIVE NEGATIVE    Urobilinogen, Urine 4.0 (N) <2.0 mg/dL    Nitrite, Urine NEGATIVE NEGATIVE    Leukocyte Esterase, Urine NEGATIVE NEGATIVE   Microscopic Only, Urine   Result Value Ref Range    WBC, Urine NONE 1-5, NONE /HPF    RBC, Urine 6-10 (A) NONE, 1-2, 3-5 /HPF    Bacteria, Urine 1+ (A) NONE SEEN /HPF    Mucus, Urine 1+ Reference range not established. /LPF        No results found for: \"TROPHS\", \"BNP\", \"HGBA1C\", \"LDLCALC\", \"VLDL\"   Last I/O:  I/O last 3 completed shifts:  In: - (0 mL/kg)   Out: 325 (4.5 mL/kg) [Urine:325 (0.1 mL/kg/hr)]  Weight: 72.6 kg     Past Cardiology Tests (Last 3 " Years):    Echo:  Transthoracic Echo (TTE) Complete 10/31/2023   1. Left ventricular systolic function is normal with a 60-65% estimated ejection fraction.   2. Spectral Doppler shows a pseudonormal pattern of left ventricular diastolic filling.   3. Abnormal septal motion consistent with left bundle branch block.   4. The left atrium is severely dilated.   5. The right atrium is severely dilated.   6. There is moderate to severe mitral annular calcification.   7. Moderate mitral valve regurgitation.   8. Moderate tricuspid regurgitation visualized.   9. There is a transcatheter aortic valve replacement.  10. S/p 34mm Medtronic Evolut TAVR with gradients of 12.1/6mmHg and trace perivalvular AI. There is correct TAVR placement with trace periprosthetic regurgitation.  11. In comparison to study 10/30/2023, there has been slight increase in TAVR gradients, the EF has improved from previous 40-45%( mildly reduced) to now normal at 60-65%. The degreee of MR appears to have decreased somewhat from moderate to severe to at least moderate( proximal flow convergence appears a bit smaller today and there is not clear systolic flow reversal in the PVs which was seen yesterday.  12. Moderately elevated right ventricular systolic pressure.  13. Poorly visualized anatomical structures due to suboptimal image quality.  14. Moderate to severre MAC with mildly thickened MV leaflets with at least moderate centrally directed. Note that the degree of MR could be underestamated due to shielding from the MAC. Note that the proximal flow convergence zone is not large.    Transthoracic Echo (TTE) Limited 10/30/2023   1. Left ventricular systolic function is mildly decreased with a 40-45% estimated ejection fraction.   2. There is global hypokinesis of the left ventricle with minor regional variations.   3. The left atrium is severely dilated.   4. Moderate to severe mitral valve regurgitation.   5. Severe aortic valve stenosis.   6. Mild  to moderate aortic valve regurgitation.   7. There is moderate to severe aortic valve cusp calcification.   8. There is moderate to severe mitral annular calcification.   9. There is aortic valve annular calcification.  10. Moderate to severe tricuspid regurgitation visualized.  11. Moderately elevated right ventricular systolic pressure.  12. Post TAVR - There was an improvement in aortic transvalvular gradients (now mean gradient 3 mmHg, peak gradient 6 mmHg, DI 0.7). There is a mild-to moderate chel-prosthetic regurgitation with one anterior and one posterior jet (A<P).  13. No formal bubble study was performed however bubbles are seen in the LV and LA post TAVR suggesting presence of L-R shunt. Recommend formal bubble study.      Inpatient Medications:  Scheduled medications   Medication Dose Route Frequency    aspirin  81 mg oral Daily    clopidogrel  75 mg oral Daily    digoxin  62.5 mcg oral Daily    ezetimibe  10 mg oral Daily    [Held by provider] ferrous sulfate  1 tablet oral Daily with breakfast    furosemide  20 mg oral Daily    heparin (porcine)  5,000 Units subcutaneous q8h    iron sucrose  200 mg intravenous Daily    lidocaine  1 patch transdermal Daily    pantoprazole  40 mg oral Daily before breakfast    polyethylene glycol  17 g oral Daily    sennosides  1 tablet oral BID    sotalol  80 mg oral BID    surgical lubricant        tamsulosin  0.4 mg oral Daily     PRN medications   Medication    acetaminophen    diclofenac sodium    melatonin    surgical lubricant     Continuous Medications   Medication Dose Last Rate     Physical Exam:  GENERAL: expressive aphasia (improving), alert and oriented X2-3   EYES: EOMI  NECK: unable to appreciate JVD sitting at 90 degrees  LUNGS: posterior bases diminished, on room air  CARDIAC: irregularly irregular; AV paced and intermittent Afib on tele; no murmur  ABDOMEN: nondistended, nontender, +BS  EXTREMITIES: trace LE edema; RUE with reduced ROM   SKIN: right  groin site CHRISTIANO, no oozing or hematoma; excoriation and reddened; left groin site CHRISTIANO, intact with ecchymosis but no hematoma  NEURO: receptive aphasia, uncoordinated movements with tasks; unable to complete finger to nose test as appeared to not understand the requests    Assessment/Plan   Javier Tate is an 88M w/ severe aortic stenosis s/p 10/30 TAVR p/w disorientation, expressive aphasia, receptive aphasia. 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 Toxic-metabolic encephalopathy and digoxin toxicity remains on the differential. Transferred to HVI service for further management.      Left parietal lobe stroke  Disorientation  Receptive/ Expressive Aphasia  - confusion and RUE shaking/tremor noted post procedure  - Neurology consulted, initial assessment showed mild aphasia and inability to hold legs up against gravity, no definitive evidence of stroke  -BAT called again for worsening dysmetria and aphasia.  NIHSS 5.  Neurology recommending brain imaging, lipid panel and HbA1c.   - CTA showed left parietal acute infarct.   - delirium precautions  - Neurology recommendations: no need for brain MRI, continue DAPT, follow up with vascular neurology in 3 mo, s/o.   - per Neurology, due to a high risk of bleeding, recommended Plavix monotherapy ( preference) until resuming anticoagulation at 2 wks post stroke   - structural heart made aware of neurology recs: with fresh PCI in July, its recommended to continue DAPT until warfarin can be started 11/13 (2 weeks post stroke), then cont with Plavix + Warfarin & discontinuing ASA 81  - neurology agreeable and okay with  plan  - Tylenol PRN for pain  - cont PT/OT/SLP Consult, OOB to chair as tolerated  - Sleep/wake cycle normalization    Severe Aortic Stenosis s/p TAVR   - 10/30/23 s/p TAVR - Evolut FX 34 mm  with Dr. Umana and Dr. Stern.   - Echo with bubbles in the LV during procedure which disappeared when IV  medication stopped. Intraop echo showed mild PVL, no PC effusion, and resolution of severe AS.   - POD 1 ECHO showed EF 60-65%, negative bubble study, triv AI, grad 12.1/6.0, mod MR (was mod/severe), mod TR, elevated RVSP, dilated IVC, and triv PC effusion   - continue gentle diuresis with lasix 20mg PO  - per Structural Heart, cont DAPT with ASA 81 mg and Plavix 75 mg PO daily, restart Coumadin per neuro/stroke (2 weeks post stroke-- 11/13), then cont Plavix with Warfarin, NO TRIPLE THERAPY  - Continue to assess LAAO and PADMAJA candidacy as outpatient    Afib  Sick Sinus Syndrome  hx of Second Degree AV block   - s/p pacemaker  - cont digoxin 62.5mcg PO every day, Dig level 0.7 (11/2)  - holding warfarin d/t potential hemorrhagic conversion of AC  - per Neurology, hold off on resuming anticoagulation until 2 wks post stroke (11/13)  - Dr. Ortiz requested EP input on atrial fib med regimen: per Dr. Landry, recommend continuing sotalol and digoxin as this was initiated as an outpatient and renal function remains stable  - cont ASA 81 mg, Clopidogrel 75mg, sotalol 80mg PO BID and digoxin 62.5mcg    HTN  - SBP last 24 hrs: 100-120s  - holding home lisinopril I/s/o stroke, promote adequate perfusion   - consider anti- htn agents as needed    Concern for infection  Leukocytosis- resolving   - ongoing borderline temps, tmax 37.9 C, warm to touch  - Tmax last 24 hours: 37.2  - WBC trend: 8.8 (10.3, 11.4, 11.4, 9.0, 9.2)  - BC x2 collected, no growth to date X2 days,  11/2 & 11/6 UA unremarkable  - cont to trend temps, CBC  - discussed repeat CXR, but pt denies SOB/ cough/ sputum--> deferred for now  - groins sites without S/S infection    Anemia  - admit hgb: 11.9, hgb s/p TAVR 9.2  - hgb trend: 8.7 (8.4 8.9, 7.8 8.6, 8.7)  - iron studies: iron 19, TIBC 284, ferritin 92, % sat 7  - Vit. B12/Folate wnl  - holding home PO iron  - initiated IV iron 200mg x 5 days (11/3-11/7)  - bowel regimen added iso constipation likely 2/2  iron supplements    Urinary retention  Hematuria  - lower abdominal pain overnight, was suspected constipation. KUB completed, pending read  - RN reported new dysuria and hematuria this morning. PVR 472cc. Minaya catheter placed, repeat UA obtained and Flomax initiated   - urology consulted for new urinary retention and hematuria: common for patient's post op with neurological changes to develop new urinary retention. Recommended trial void prior to discharge date. If patient fails trial void, minaya cath to be replaced and follow up as an outpatient. Pending assessment of new hematuria.     Constipation  - Reports he has not had a BM since admission   - 11/6 overnight, reported lower abdominal pain  - KUB completed and pending read  - advanced bowel regimen    GI ppx  - cont pantoprazole 40mg PO daily     Chronic Arthritis  - Family also noted concerns about his knees bilaterally, stating he has a history of arthritis (unsure if rheumatologic)  - has history of injections by orthopedics as an outpatient  - Family asked if patient can have his knees drained as this has previously helped with his ambulation. Patient initially denied pain of his knees, although when prompted by family, he endorsed some pain with palpation  - will continue to reassess knees daily--> no complaints today  - if concern for septic arthritis, will intervene while inpatient     Dispo  PT rec Acute Rehab, pending updated PT/OT recs for AR    DVT ppx: subq Heparin    Seen and discussed with Dr. Ortiz    Peripheral IV 10/02/23 20 G Right Antecubital (Active)   Site Assessment Clean;Dry;Intact 11/02/23 1532   Dressing Status Clean;Dry 11/02/23 1532   Number of days: 31       Peripheral IV 10/30/23 20 G Right;Anterior Forearm (Active)   Site Assessment Clean;Dry;Intact 11/02/23 1532   Dressing Status Clean;Dry 11/02/23 1532   Number of days: 3       Code Status:  Full Code    I spent 30 minutes in the professional and overall care of this  patient.    Mook Monterroso PA-C

## 2023-11-06 NOTE — PROGRESS NOTES
Occupational Therapy    Occupational Therapy Treatment    Name: Javier Tate  MRN: [id number]  : 1935  Date: 23  Time Calculation  Start Time: 1107  Stop Time: 1121  Time Calculation (min): 14 min    Assessment:  Medical Staff Made Aware: Yes  End of Session Communication: Bedside nurse  End of Session Patient Position: Bed, 3 rail up, Alarm on  Plan:       Subjective   General:  OT Last Visit  OT Received On: 23  General  Reason for Referral: s/p TAVR c/b confusion, dysmetria, and aphasia; multiple BATs called for c/f stroke; neurology following, awaiting imaging, but no immediate plans  Past Medical History Relevant to Rehab: Afib, CAD (2023 PCI), HTN, Severe AS  Missed Visit: Yes  Missed Visit Reason:  (nursing aid present to assist pt with using a urinal, per aid pt needs to be seen by RN. Will reattempt at a later time at schedule permits.)  Family/Caregiver Present: Yes  Caregiver Feedback:  (wife)  Co-Treatment: PT  Co-Treatment Reason: to maximize pt safety  Prior to Session Communication: Bedside nurse  Patient Position Received: Bed, 3 rail up, Alarm on  Preferred Learning Style: verbal  General Comment: The pt willing to participate in therapy.  Vitals:     Pain Assessment:  Pain Assessment  Pain Assessment: 0-10  Pain Score: 0 - No pain     Objective   Activities of Daily Living:      LE Bathing  LE Bathing Level of Assistance: Maximum assistance (anticipate)  LE Bathing Where Assessed: Edge of bed      LE Dressing  Pants Level of Assistance: Maximum assistance (anticipate)         Bed Mobility/Transfers: Bed Mobility  Bed Mobility: Yes  Bed Mobility 1  Bed Mobility 1: Sitting to supine  Level of Assistance 1: Moderate verbal cues (x1)  Bed Mobility 2  Bed Mobility  2: Scooting  Level of Assistance 2: Dependent (x2)    Transfer 1  Transfer From 1: Sit to  Transfer to 1: Stand  Technique 1: Sit to stand  Transfer Level of Assistance 1: Maximum assistance, +2  Transfers 2  Transfer  From 2: Chair without arms to  Transfer to 2: Bed  Technique 2: Stand pivot  Transfer Level of Assistance 2: Maximum assistance, +2       Strength:  Strength  Strength Comments: B UE/LE grossly WFL observed, questionably decreased LUE weakness vs R but difficult to formally assess (no tone noted however with fluctuating R vs L flexor tone positioning observed of BUE with occurance in R > L)      Outcome Measures:  [facility] Daily Activity  Putting on and taking off regular lower body clothing: A lot  Bathing (including washing, rinsing, drying): A lot  Putting on and taking off regular upper body clothing: A lot  Toileting, which includes using toilet, bedpan or urinal: A lot  Taking care of personal grooming such as brushing teeth: A little  Eating Meals: A little  Daily Activity - Total Score: 14        Education Documentation  Body Mechanics, taught by Jelly Adhikari OT at 11/6/2023 12:50 PM.  Learner: Patient  Readiness: Acceptance  Method: Explanation  Response: Verbalizes Understanding    Precautions, taught by Jelly Adhikari OT at 11/6/2023 12:50 PM.  Learner: Patient  Readiness: Acceptance  Method: Explanation  Response: Verbalizes Understanding    ADL Training, taught by Jelly Adhikari OT at 11/6/2023 12:50 PM.  Learner: Patient  Readiness: Acceptance  Method: Explanation  Response: Verbalizes Understanding    Education Comments  No comments found.      Goals:  Encounter Problems       Encounter Problems (Active)       ADLs       Pt will perform grooming task in supported sitting with Supevision and minimal verbal cues for safety. (Progressing)       Start:  10/31/23    Expected End:  11/21/23            Pt will perform UB dressing with Supervision and minimal verbal cues for safety in supported sitting.  (Progressing)       Start:  10/31/23    Expected End:  11/21/23            Pt will perform LB dressing with Supervision and minimal verbal cues for safety. (Progressing)       Start:  10/31/23    Expected  End:  11/21/23                       COGNITION/SAFETY       Pt will complete cognitive assessment while maintaining attention to task >8 minutes for increased ADL engagement. (Progressing)       Start:  10/31/23    Expected End:  11/21/23            Pt will follow >90% of simple one-step commands with minimal verbal cues for accuracy.  (Progressing)       Start:  10/31/23    Expected End:  11/21/23               MOBILITY       Pt will complete household distance functional mobility with Supervision using LRD and minimal verbal cues for safety. (Progressing)       Start:  10/31/23    Expected End:  11/21/23                       TRANSFERS       Pt will complete supine<>sit transfers with Supervision and minimal verbal cues for safety. (Progressing)       Start:  10/31/23    Expected End:  11/21/23            Pt will complete sit<>stand transfers with Supervision using LRD and minimal verbal cues for safety. (Progressing)       Start:  10/31/23    Expected End:  11/21/23

## 2023-11-06 NOTE — CONSULTS
Reason for Consult:  Acute urinary retention, hematuria    HPI: Javier Tate is an 88 y.o. male with a significant pmh of  HTN, HLD, SSS, second degree AV Block S/P PPM 2014, permanent AF, CAD S/P PCI 7/17/2023 - atherectomy + PCI to LMCA and proximal LAD, PAD, CKD3, ANTONIO, severe aortic valve stenosis s/p failed TAVR procedure 8/8/2023 who is now s/p TAVR (10/30) complicated by a L parietal lobe stroke. Urology is consulted for evaluation and management recommendations for acute urinary retention and gross hematuria.     The patient was voiding independently postoperatively. On the evening of 11/5 he noted blood spotting per urethra and began to have difficulty voiding. He began to endorse abdominal pain overnight thought to be due to constipation, though a bladder scan revealed >400cc urine in the bladder with the patient unable to void. A minaya catheter was placed. Of note, the patient has been on Plavix. Urology was consulted in the setting of blood spotting from the urethra and acute urinary retention.     The patient has no urologic history and has never had difficulty with urination. A UA from several days ago was unconcerning for infection. Hemoglobin has been stable.     A minaya catheter is now in place draining clear yellow urine, mild blood spotting is visualized on the chux below the penis. His main complaint now with the catheter in place is pain at the urethral meatus as well as bladder spasms.      PMHx:  HTN, HLD, SSS, second degree AV Block S/P PPM 2014, permanent AF, CAD S/P PCI 7/17/2023 - atherectomy + PCI to LMCA and proximal LAD, severe aortic valve stenosis Failed TAVR procedure 8/8/2023, PAD, CKD3, ANTONIO  PSHx: PCI with stent placement, inguinal hernia repair, pacemaker insertion, TAVR  Shx: remote tobacco use, occasional etoh, denies ilicits  Allergies: Statins, tape, shellfish  Meds: tylenol, asa81, plavix, digoxin, ezetimibe, SQH, sotalol, furosemide, pantoprazole, miralax, senna    Vitals:     23 1443   BP: 125/54   Pulse: 75   Resp: 17   Temp: 36.8 °C (98.2 °F)   SpO2: 96%     Labs & Test Results  I/O this shift:  In: 360 [P.O.:360]  Out: 925 [Urine:925]    Results from last 72 hours   Lab Units 23  1740 23  1626 23  1625 23  1837   CREATININE mg/dL 1.15 1.11  --  1.29   HEMOGLOBIN g/dL 8.7*  --  8.4* 8.9*   WBC AUTO x10*3/uL 8.8  --  10.3 11.4*   GLUCOSE mg/dL 153* 148*  --  168*   MAGNESIUM mg/dL 2.15 2.10  --  2.12   POTASSIUM mmol/L 3.6 3.7  --  3.8   CHLORIDE mmol/L 103 103  --  102   CALCIUM mg/dL 8.8 8.8  --  8.9       *INR 1.2*, UA: +blood/+urobilinogen/-LE/-nitrites*    Imagin23 CT - bilateral kidneys are normal in size and enhance symmetrically, few small bilateral renal hypodense lesions, with the largest measuring 1.1 cm on the right, which are suboptimally assessed, but statistically represent simple cysts, no evidence of hydroureteronephrosis or nephroureterolithiasis. Within the pelvis, the urinary bladder is under distended, limiting evaluation. Underlying wall thickening is not excluded. Prostate gland is normal in size with small coarse calcification. Otherwise, there are no pelvic masses.     Physical Exam  General: resting comfortably in bed  Neuro: alert and oriented  Head/Neck: normocephalic, atraumatic  ENT: moist mucous membranes  CV: regular rate per chart  Pulm: nonlabored breathing on room air  Abd: soft, nondistended, nontender  : minaya catheter draining clear yellow urine  MSK: NOBLE, no gross deformities  Psych: mood and behavior normal          Impression & Recommendations  88 y.o. male with recent TAVR c/b L parietal stroke, on Plavix, who urology is now consulted on for acute urinary retention and blood per urethral meatus. Given the patient's numerous risk factors for acute urinary retention, it is not unexpected. When the patient was seen by the urology team, minimal blood spotting was noted on the chux but the urine was crystal  clear yellow. Given that the patient just had catheter placement and is on Plavix with plans to transition to Warfarin, it is expected that there may be increasing waxing/waning hematuria. He is also experiencing painful bladder spasms and irritation at the urethral meatus.    #Acute urinary retention  - maintain Hagan catheter  - avoid anticholinergic medications  - continue Flomax unless patient becomes hypotensive/lightheaded  - recommend a repeat trial of void at midnight on the day of discharge, if the patient voids without elevated PVR (>250cc or symptomatic) can be discharged without a catheter, if patient is unable to void or continues to retain can be discharged with the catheter and follow up with urology for an outpatient trial of void    #Gross hematuria  - urine crystal clear yellow, some mild urethral bleeding  - as long as urine drains well through catheter without passage of large clots or acute drops in hemoglobin, some hematuria is to be expected with patient on Plavix and catheter in place  - outpatient follow up for workup of hematuria     #Catheter discomfort  - can order urojet PRN to apply around urethral meatus   - for bladder spasms, do not recommend oxybutynin given patient's age and neuro status and plans for upcoming trial of void  - can trial rectal valium administration for bladder spasms if ok with primary team  - hot packs over bladder can relieve symptoms    - Urology to sign off, please page with questions/concerns    Seen with resident Dr. Urrutia and discussed with chief resident Dr. Lavinia Leon PA-C  Pager: [phone]

## 2023-11-06 NOTE — PROGRESS NOTES
Physical Therapy    Physical Therapy Treatment    Patient Name: Javier Tate  MRN: [id number]  Today's Date: 11/6/2023  Time Calculation  Start Time: 1107  Stop Time: 1121  Time Calculation (min): 14 min       Assessment/Plan   PT Assessment  PT Assessment Results: Decreased strength, Decreased endurance, Impaired balance, Decreased mobility, Decreased coordination, Pain  Rehab Prognosis: Good  Barriers to Discharge: none  Evaluation/Treatment Tolerance: Patient limited by fatigue  Medical Staff Made Aware: Yes  Strengths: Support of extended family/friends  Barriers to Participation:  (none)  End of Session Communication: Bedside nurse  Assessment Comment: Pt continues to demo dec strength, balance, & endurance limiting functional mobility.  Would benefit from continued skilled therapy.  End of Session Patient Position: Bed, 3 rail up, Alarm on  PT Plan  Inpatient/Swing Bed or Outpatient: Inpatient  PT Plan  Treatment/Interventions: Bed mobility, Transfer training, Gait training, Stair training, Balance training, Neuromuscular re-education, Strengthening, Endurance training, Therapeutic exercise, Therapeutic activity, Positioning  PT Plan: Skilled PT  PT Frequency: 4 times per week  PT Discharge Recommendations: High intensity level of continued care  Equipment Recommended upon Discharge:  (none)  PT Recommended Transfer Status: Assist x2  PT - OK to Discharge: Yes (PT eval complete & discharge recs made)    General Visit Information:   PT  Visit  PT Received On: 11/06/23  Response to Previous Treatment: Patient with no complaints from previous session.  General  Reason for Referral: s/p TAVR c/b confusion, dysmetria, and aphasia  Past Medical History Relevant to Rehab: Afib, CAD (7/2023 PCI), HTN, Severe AS  Missed Visit: No  Family/Caregiver Present: Yes  Caregiver Feedback:  (wife present, supportive)  Co-Treatment: OT  Co-Treatment Reason: Pt seen with OT to maximize pt safety & functional mobility while  focusing on discipline specific goals  Prior to Session Communication: Bedside nurse  Patient Position Received: Up in chair  General Comment: Pt up in chair, needing to return to bed for procedure.  Willing to participate in brief therapy session.    Subjective   Precautions:  Precautions  Medical Precautions: Fall precautions       Objective   Pain:  Pain Assessment  Pain Assessment:  (pt denies pain, though earlier c/o pain in abdomen)  Cognition:  Cognition  Orientation Level:  (oriented to self, place; not month/year)  Following Commands:  (inc time, cueing for simple commands)        Treatments:  Therapeutic Exercise  Therapeutic Exercise Performed: Yes  Therapeutic Exercise Activity 1: APs, HS with Abe BLE x 10          Bed Mobility 1  Bed Mobility 1: Sitting to supine  Level of Assistance 1: Moderate assistance, Moderate verbal cues    Ambulation/Gait Training  Ambulation/Gait Training Performed: Yes  Ambulation/Gait Training 1  Surface 1: Level tile  Assistance 1: Arm in arm assistance, Maximum assistance (x2)  Quality of Gait 1:  (unsteady, inconsistent step length, forward flexed posture with difficulty seqeuncing steps)  Comments/Distance (ft) 1: chair to bed  Transfers  Transfer: Yes  Transfer 1  Technique 1: Sit to stand, Stand to sit  Transfer Level of Assistance 1: Maximum assistance, +2, Arm in arm assistance    Outcome Measures:  [facility] Basic Mobility  Turning from your back to your side while in a flat bed without using bedrails: A lot  Moving from lying on your back to sitting on the side of a flat bed without using bedrails: A lot  Moving to and from bed to chair (including a wheelchair): Total  Standing up from a chair using your arms (e.g. wheelchair or bedside chair): Total  To walk in hospital room: Total  Climbing 3-5 steps with railing: Total  Basic Mobility - Total Score: 8    Education Documentation  Mobility Training, taught by Lorenza Fountain, PT at 11/6/2023  1:48 PM.  Learner:  Significant Other, Patient  Readiness: Acceptance  Method: Explanation  Response: Verbalizes Understanding    Education Comments  No comments found.        OP EDUCATION:  Education  Individual(s) Educated: Patient, Spouse  Education Provided: Body Mechanics, Fall Risk, Home Exercise Program  Patient Response to Education: Patient/Caregiver Verbalized Understanding of Information    Encounter Problems       Encounter Problems (Active)       Balance       Pt will score >24 on Tinetti for low risk of falls. (Progressing)       Start:  10/31/23    Expected End:  11/14/23               Mobility       Patient will ambulate >75 ft with LRAD and supervision for improved functional mobility (Progressing)       Start:  10/31/23    Expected End:  11/14/23               Pain - Adult          Transfers       Patient to transfer to and from sit to supine independently (Progressing)       Start:  10/31/23    Expected End:  11/14/23            Patient will transfer sit to and from stand with LRAD and supervision for improved functional mobility (Progressing)       Start:  10/31/23    Expected End:  11/14/23

## 2023-11-06 NOTE — PROGRESS NOTES
Occupational Therapy                 Therapy Communication Note    Patient Name: Javier Tate  MRN: [id number]  Today's Date: 11/6/2023     Discipline: Occupational Therapy    Missed Visit Reason: Missed Visit Reason:  (nursing aid present to assist pt with using a urinal, per aid pt needs to be seen by RN. Will reattempt at a later time at schedule permits.)    Missed Time: Attempt    Comment:

## 2023-11-07 ENCOUNTER — APPOINTMENT (OUTPATIENT)
Dept: CARDIOLOGY | Facility: HOSPITAL | Age: 88
DRG: 266 | End: 2023-11-07
Payer: MEDICARE

## 2023-11-07 LAB
ALBUMIN SERPL BCP-MCNC: 2.9 G/DL (ref 3.4–5)
ANION GAP SERPL CALC-SCNC: 14 MMOL/L (ref 10–20)
ATRIAL RATE: 73 BPM
BUN SERPL-MCNC: 27 MG/DL (ref 6–23)
CALCIUM SERPL-MCNC: 8.3 MG/DL (ref 8.6–10.6)
CHLORIDE SERPL-SCNC: 99 MMOL/L (ref 98–107)
CO2 SERPL-SCNC: 29 MMOL/L (ref 21–32)
CREAT SERPL-MCNC: 1.03 MG/DL (ref 0.5–1.3)
ERYTHROCYTE [DISTWIDTH] IN BLOOD BY AUTOMATED COUNT: 13.8 % (ref 11.5–14.5)
EST. AVERAGE GLUCOSE BLD GHB EST-MCNC: 97 MG/DL
GFR SERPL CREATININE-BSD FRML MDRD: 71 ML/MIN/1.73M*2
GLUCOSE SERPL-MCNC: 118 MG/DL (ref 74–99)
HBA1C MFR BLD: 5 %
HCT VFR BLD AUTO: 26.7 % (ref 41–52)
HGB BLD-MCNC: 8.2 G/DL (ref 13.5–17.5)
INR PPP: 1.1 (ref 0.9–1.1)
MAGNESIUM SERPL-MCNC: 2.15 MG/DL (ref 1.6–2.4)
MCH RBC QN AUTO: 30 PG (ref 26–34)
MCHC RBC AUTO-ENTMCNC: 30.7 G/DL (ref 32–36)
MCV RBC AUTO: 98 FL (ref 80–100)
NRBC BLD-RTO: 0 /100 WBCS (ref 0–0)
PHOSPHATE SERPL-MCNC: 2.4 MG/DL (ref 2.5–4.9)
PLATELET # BLD AUTO: 210 X10*3/UL (ref 150–450)
POTASSIUM SERPL-SCNC: 3.6 MMOL/L (ref 3.5–5.3)
PROTHROMBIN TIME: 12.5 SECONDS (ref 9.8–12.8)
Q ONSET: 196 MS
QRS COUNT: 12 BEATS
QRS DURATION: 154 MS
QT INTERVAL: 448 MS
QTC CALCULATION(BAZETT): 493 MS
QTC FREDERICIA: 478 MS
R AXIS: -77 DEGREES
RBC # BLD AUTO: 2.73 X10*6/UL (ref 4.5–5.9)
SODIUM SERPL-SCNC: 138 MMOL/L (ref 136–145)
T AXIS: 88 DEGREES
T OFFSET: 420 MS
VENTRICULAR RATE: 73 BPM
WBC # BLD AUTO: 19.1 X10*3/UL (ref 4.4–11.3)

## 2023-11-07 PROCEDURE — 2500000001 HC RX 250 WO HCPCS SELF ADMINISTERED DRUGS (ALT 637 FOR MEDICARE OP)

## 2023-11-07 PROCEDURE — 36415 COLL VENOUS BLD VENIPUNCTURE: CPT

## 2023-11-07 PROCEDURE — 85610 PROTHROMBIN TIME: CPT

## 2023-11-07 PROCEDURE — 84550 ASSAY OF BLOOD/URIC ACID: CPT

## 2023-11-07 PROCEDURE — 99231 SBSQ HOSP IP/OBS SF/LOW 25: CPT | Performed by: NURSE PRACTITIONER

## 2023-11-07 PROCEDURE — 1200000002 HC GENERAL ROOM WITH TELEMETRY DAILY

## 2023-11-07 PROCEDURE — 99233 SBSQ HOSP IP/OBS HIGH 50: CPT | Performed by: INTERNAL MEDICINE

## 2023-11-07 PROCEDURE — 2500000004 HC RX 250 GENERAL PHARMACY W/ HCPCS (ALT 636 FOR OP/ED)

## 2023-11-07 PROCEDURE — 84100 ASSAY OF PHOSPHORUS: CPT

## 2023-11-07 PROCEDURE — 2500000004 HC RX 250 GENERAL PHARMACY W/ HCPCS (ALT 636 FOR OP/ED): Performed by: NURSE PRACTITIONER

## 2023-11-07 PROCEDURE — 83735 ASSAY OF MAGNESIUM: CPT

## 2023-11-07 PROCEDURE — 2500000002 HC RX 250 W HCPCS SELF ADMINISTERED DRUGS (ALT 637 FOR MEDICARE OP, ALT 636 FOR OP/ED)

## 2023-11-07 PROCEDURE — 85027 COMPLETE CBC AUTOMATED: CPT

## 2023-11-07 PROCEDURE — 93005 ELECTROCARDIOGRAM TRACING: CPT

## 2023-11-07 PROCEDURE — 96372 THER/PROPH/DIAG INJ SC/IM: CPT

## 2023-11-07 RX ADMIN — HEPARIN SODIUM 5000 UNITS: 5000 INJECTION INTRAVENOUS; SUBCUTANEOUS at 09:30

## 2023-11-07 RX ADMIN — HEPARIN SODIUM 5000 UNITS: 5000 INJECTION INTRAVENOUS; SUBCUTANEOUS at 01:00

## 2023-11-07 RX ADMIN — HEPARIN SODIUM 5000 UNITS: 5000 INJECTION INTRAVENOUS; SUBCUTANEOUS at 17:32

## 2023-11-07 RX ADMIN — CLOPIDOGREL BISULFATE 75 MG: 75 TABLET ORAL at 09:40

## 2023-11-07 RX ADMIN — EZETIMIBE 10 MG: 10 TABLET ORAL at 09:40

## 2023-11-07 RX ADMIN — ASPIRIN 81 MG: 81 TABLET, COATED ORAL at 09:38

## 2023-11-07 RX ADMIN — FUROSEMIDE 20 MG: 20 TABLET ORAL at 09:39

## 2023-11-07 RX ADMIN — SOTALOL HYDROCHLORIDE 80 MG: 80 TABLET ORAL at 09:39

## 2023-11-07 RX ADMIN — Medication 5 MG: at 20:35

## 2023-11-07 RX ADMIN — SOTALOL HYDROCHLORIDE 80 MG: 80 TABLET ORAL at 20:22

## 2023-11-07 RX ADMIN — SENNOSIDES 8.6 MG: 8.6 TABLET, FILM COATED ORAL at 09:39

## 2023-11-07 RX ADMIN — TAMSULOSIN HYDROCHLORIDE 0.4 MG: 0.4 CAPSULE ORAL at 09:40

## 2023-11-07 RX ADMIN — PANTOPRAZOLE SODIUM 40 MG: 40 TABLET, DELAYED RELEASE ORAL at 06:01

## 2023-11-07 RX ADMIN — IRON SUCROSE 200 MG: 20 INJECTION, SOLUTION INTRAVENOUS at 06:01

## 2023-11-07 ASSESSMENT — COGNITIVE AND FUNCTIONAL STATUS - GENERAL
DRESSING REGULAR LOWER BODY CLOTHING: A LOT
HELP NEEDED FOR BATHING: A LOT
TURNING FROM BACK TO SIDE WHILE IN FLAT BAD: A LOT
DRESSING REGULAR UPPER BODY CLOTHING: A LOT
MOVING FROM LYING ON BACK TO SITTING ON SIDE OF FLAT BED WITH BEDRAILS: A LOT
DAILY ACTIVITIY SCORE: 13
WALKING IN HOSPITAL ROOM: TOTAL
TOILETING: A LOT
EATING MEALS: A LITTLE
STANDING UP FROM CHAIR USING ARMS: A LOT
CLIMB 3 TO 5 STEPS WITH RAILING: TOTAL
MOBILITY SCORE: 10
PERSONAL GROOMING: A LOT
MOVING TO AND FROM BED TO CHAIR: A LOT

## 2023-11-07 ASSESSMENT — PAIN SCALES - GENERAL: PAINLEVEL_OUTOF10: 0 - NO PAIN

## 2023-11-07 NOTE — PROGRESS NOTES
Subjective Data:  Patient's wife notes that overnight he had an episode of clarity A&Ox3, as he was able to converse for ~50 mins, speech was clear and patient was able to remember kids / grand kids. Wife feeling optimistic about patient's progress.     Wife requesting SLP continue exercises/evaluations while patient is admitted. Reordered SLP evaluation.     - EP reassessed today: recommend discontinuation of digoxin and referral to follow up with EP at   - BG elevated, ranging 148-223 throughout admission, will continue to trend  - Hgb A1c 5.0  - KUB 11/6: Moderate amount of stool in nondistended loops of small large bowel correlate with a component of fecal impaction. Severe atherosclerotic changes of the abdominal aorta.   - wife and RN confirm 2 bowel movements yesterday  - continue bowel regimen   - minaya in place, linette colored urine on assessment this morning  - per urology: as long as urine drains well through catheter without passage of large clots or acute drops in hemoglobin, some hematuria is to be expected with patient on Plavix and catheter in place. Recommend a repeat trial of void at midnight on the day of discharge, if the patient voids without elevated PVR (>250cc or symptomatic) can be discharged without a catheter, if patient is unable to void or continues to retain can be discharged with the catheter and follow up with urology for an outpatient trial of void   - repeat UA negative for UTI  - precert for AR submitted yesterday    Overnight Events:    Episode if clarity/A&Ox3 for approx 50 min, about to converse clearly with wife at bedside.     Objective Data:  Last Recorded Vitals:  Vitals:    11/06/23 2129 11/07/23 0143 11/07/23 0516 11/07/23 1015   BP: 112/61 139/70 138/72 131/52   BP Location: Right arm Right arm Right arm    Patient Position: Lying Sitting Lying    Pulse: 71 72 80 76   Resp: 18 19 16 18   Temp: 36.7 °C (98.1 °F) 36.3 °C (97.3 °F) 36.8 °C (98.2 °F) 37.2 °C (98.9 °F)    TempSrc: Temporal Temporal Temporal    SpO2: 93% 94%  95%   Weight:   74 kg (163 lb 2.3 oz)    Height:           Last Labs:  Results for orders placed or performed during the hospital encounter of 10/30/23 (from the past 24 hour(s))   Urinalysis with Reflex Microscopic   Result Value Ref Range    Color, Urine Yellow Straw, Yellow    Appearance, Urine Clear Clear    Specific Gravity, Urine 1.020 1.005 - 1.035    pH, Urine 6.0 5.0, 5.5, 6.0, 6.5, 7.0, 7.5, 8.0    Protein, Urine NEGATIVE NEGATIVE mg/dL    Glucose, Urine NEGATIVE NEGATIVE mg/dL    Blood, Urine SMALL (1+) (A) NEGATIVE    Ketones, Urine NEGATIVE NEGATIVE mg/dL    Bilirubin, Urine NEGATIVE NEGATIVE    Urobilinogen, Urine 4.0 (N) <2.0 mg/dL    Nitrite, Urine NEGATIVE NEGATIVE    Leukocyte Esterase, Urine NEGATIVE NEGATIVE   Microscopic Only, Urine   Result Value Ref Range    WBC, Urine NONE 1-5, NONE /HPF    RBC, Urine 6-10 (A) NONE, 1-2, 3-5 /HPF    Bacteria, Urine 1+ (A) NONE SEEN /HPF    Mucus, Urine 1+ Reference range not established. /LPF   CBC   Result Value Ref Range    WBC 8.9 4.4 - 11.3 x10*3/uL    nRBC 0.0 0.0 - 0.0 /100 WBCs    RBC 2.92 (L) 4.50 - 5.90 x10*6/uL    Hemoglobin 8.8 (L) 13.5 - 17.5 g/dL    Hematocrit 29.5 (L) 41.0 - 52.0 %     (H) 80 - 100 fL    MCH 30.1 26.0 - 34.0 pg    MCHC 29.8 (L) 32.0 - 36.0 g/dL    RDW 13.7 11.5 - 14.5 %    Platelets 196 150 - 450 x10*3/uL   Renal function panel   Result Value Ref Range    Glucose 212 (H) 74 - 99 mg/dL    Sodium 138 136 - 145 mmol/L    Potassium 4.0 3.5 - 5.3 mmol/L    Chloride 100 98 - 107 mmol/L    Bicarbonate 27 21 - 32 mmol/L    Anion Gap 15 10 - 20 mmol/L    Urea Nitrogen 34 (H) 6 - 23 mg/dL    Creatinine 1.07 0.50 - 1.30 mg/dL    eGFR 68 >60 mL/min/1.73m*2    Calcium 8.4 (L) 8.6 - 10.6 mg/dL    Phosphorus 2.5 2.5 - 4.9 mg/dL    Albumin 2.9 (L) 3.4 - 5.0 g/dL   Magnesium   Result Value Ref Range    Magnesium 2.12 1.60 - 2.40 mg/dL   Protime-INR   Result Value Ref Range    Protime  13.4 (H) 9.8 - 12.8 seconds    INR 1.2 (H) 0.9 - 1.1   Hemoglobin A1c   Result Value Ref Range    Hemoglobin A1C 5.0 see below %    Estimated Average Glucose 97 Not Established mg/dL        HGBA1C   Date/Time Value Ref Range Status   11/06/2023 06:45 PM 5.0 see below % Final      Last I/O:  I/O last 3 completed shifts:  In: 360 (4.9 mL/kg) [P.O.:360]  Out: 1275 (17.2 mL/kg) [Urine:1275 (0.5 mL/kg/hr)]  Weight: 74 kg     Past Cardiology Tests (Last 3 Years):    Echo:  Transthoracic Echo (TTE) Complete 10/31/2023   1. Left ventricular systolic function is normal with a 60-65% estimated ejection fraction.   2. Spectral Doppler shows a pseudonormal pattern of left ventricular diastolic filling.   3. Abnormal septal motion consistent with left bundle branch block.   4. The left atrium is severely dilated.   5. The right atrium is severely dilated.   6. There is moderate to severe mitral annular calcification.   7. Moderate mitral valve regurgitation.   8. Moderate tricuspid regurgitation visualized.   9. There is a transcatheter aortic valve replacement.  10. S/p 34mm Medtronic Evolut TAVR with gradients of 12.1/6mmHg and trace perivalvular AI. There is correct TAVR placement with trace periprosthetic regurgitation.  11. In comparison to study 10/30/2023, there has been slight increase in TAVR gradients, the EF has improved from previous 40-45%( mildly reduced) to now normal at 60-65%. The degreee of MR appears to have decreased somewhat from moderate to severe to at least moderate( proximal flow convergence appears a bit smaller today and there is not clear systolic flow reversal in the PVs which was seen yesterday.  12. Moderately elevated right ventricular systolic pressure.  13. Poorly visualized anatomical structures due to suboptimal image quality.  14. Moderate to severre MAC with mildly thickened MV leaflets with at least moderate centrally directed. Note that the degree of MR could be underestamated due to  shielding from the MAC. Note that the proximal flow convergence zone is not large.    Transthoracic Echo (TTE) Limited 10/30/2023   1. Left ventricular systolic function is mildly decreased with a 40-45% estimated ejection fraction.   2. There is global hypokinesis of the left ventricle with minor regional variations.   3. The left atrium is severely dilated.   4. Moderate to severe mitral valve regurgitation.   5. Severe aortic valve stenosis.   6. Mild to moderate aortic valve regurgitation.   7. There is moderate to severe aortic valve cusp calcification.   8. There is moderate to severe mitral annular calcification.   9. There is aortic valve annular calcification.  10. Moderate to severe tricuspid regurgitation visualized.  11. Moderately elevated right ventricular systolic pressure.  12. Post TAVR - There was an improvement in aortic transvalvular gradients (now mean gradient 3 mmHg, peak gradient 6 mmHg, DI 0.7). There is a mild-to moderate chel-prosthetic regurgitation with one anterior and one posterior jet (A<P).  13. No formal bubble study was performed however bubbles are seen in the LV and LA post TAVR suggesting presence of L-R shunt. Recommend formal bubble study.      Inpatient Medications:  Scheduled medications   Medication Dose Route Frequency    aspirin  81 mg oral Daily    clopidogrel  75 mg oral Daily    digoxin  62.5 mcg oral Daily    ezetimibe  10 mg oral Daily    [Held by provider] ferrous sulfate  1 tablet oral Daily with breakfast    furosemide  20 mg oral Daily    heparin (porcine)  5,000 Units subcutaneous q8h    lidocaine  1 patch transdermal Daily    pantoprazole  40 mg oral Daily before breakfast    polyethylene glycol  17 g oral Daily    sennosides  1 tablet oral BID    sotalol  80 mg oral BID    tamsulosin  0.4 mg oral Daily     PRN medications   Medication    acetaminophen    diclofenac sodium    lidocaine    melatonin     Continuous Medications   Medication Dose Last Rate      Physical Exam:  GENERAL: expressive aphasia (improving), alert and oriented X2-3   EYES: EOMI  NECK: unable to appreciate JVD sitting at 90 degrees  LUNGS: posterior bases diminished, on room air  CARDIAC: irregularly irregular; AV paced and intermittent Afib on tele; no murmur  ABDOMEN: nondistended, nontender, +BS  EXTREMITIES: trace LE edema; RUE with reduced ROM-- improving    SKIN: right groin site CHRISTIANO, no oozing or hematoma; excoriation and reddened; left groin site CHRISTIANO, intact with ecchymosis but no hematoma  NEURO: receptive aphasia, uncoordinated movements with tasks; unable to complete finger to nose test as appeared to not understand the requests    Assessment/Plan   Javier Tate is an 88M w/ severe aortic stenosis s/p 10/30 TAVR p/w disorientation, expressive aphasia, receptive aphasia. 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 Toxic-metabolic encephalopathy and digoxin toxicity remains on the differential. Transferred to HVI service for further management.      Left parietal lobe stroke  Disorientation  Receptive/ Expressive Aphasia  - confusion and RUE shaking/tremor noted post procedure  - Neurology consulted, initial assessment showed mild aphasia and inability to hold legs up against gravity, no definitive evidence of stroke  -BAT called again for worsening dysmetria and aphasia.  NIHSS 5.  Neurology recommending brain imaging, lipid panel and HbA1c.   - CTA showed left parietal acute infarct.   - delirium precautions  - Neurology recommendations: no need for brain MRI, continue DAPT, follow up with vascular neurology in 3 mo, s/o.   - per Neurology, due to a high risk of bleeding, recommended Plavix monotherapy ( preference) until resuming anticoagulation at 2 wks post stroke   - structural heart made aware of neurology recs: with fresh PCI in July, its recommended to continue DAPT until warfarin can be started 11/13 (2 weeks post stroke), then  cont with Plavix + Warfarin & discontinuing ASA 81  - neurology agreeable and okay with  plan  - Tylenol PRN for pain  - cont PT/OT/SLP Consult, OOB to chair as tolerated  - Sleep/wake cycle normalization    Severe Aortic Stenosis s/p TAVR   - 10/30/23 s/p TAVR - Evolut FX 34 mm  with Dr. Umana and Dr. Stern.   - Echo with bubbles in the LV during procedure which disappeared when IV medication stopped. Intraop echo showed mild PVL, no PC effusion, and resolution of severe AS.   - POD 1 ECHO showed EF 60-65%, negative bubble study, triv AI, grad 12.1/6.0, mod MR (was mod/severe), mod TR, elevated RVSP, dilated IVC, and triv PC effusion   - continue gentle diuresis with lasix 20mg PO  - per Structural Heart, cont DAPT with ASA 81 mg and Plavix 75 mg PO daily, restart Coumadin per neuro/stroke (2 weeks post stroke-- 11/13), then cont Plavix with Warfarin, NO TRIPLE THERAPY  - Continue to assess LAAO and PADMAJA candidacy as outpatient    Afib  Sick Sinus Syndrome  hx of Second Degree AV block   - s/p pacemaker  - cont digoxin 62.5mcg PO every day, Dig level 0.7 (11/2)  - holding warfarin d/t potential hemorrhagic conversion of AC  - per Neurology, hold off on resuming anticoagulation until 2 wks post stroke (11/13)  - 11/6 Dr. Ortiz requested EP input on atrial fib med regimen: per Dr. Landry, recommend continuing sotalol and digoxin as this was initiated as an outpatient and renal function remains stable  - EP reassessed today: recommend discontinuation of digoxin and referral to follow up with EP at   - cont ASA 81 mg, Clopidogrel 75mg, sotalol 80mg PO BID and digoxin 62.5mcg    HTN  - SBP last 24 hrs: 100-120s  - holding home lisinopril I/s/o stroke, promote adequate perfusion   - consider anti- htn agents as needed    Mild hyperglycemia  - wife declines any history of diabetes  - BG elevated, ranging 148-223 throughout admission  - Hgb A1c 5.0, average BG 97  - c/w Glucerna oral nutritional supplements   -  will continue to monitor BG    Concern for infection  Leukocytosis- resolving   - ongoing borderline temps, tmax 37.9 C, warm to touch  - Tmax last 24 hours: 37.2  - WBC trend: 8.9 (8.8, 10.3, 11.4, 11.4, 9.0, 9.2)  - BC x2 collected, no growth to date X2 days,  11/2 & 11/6 UA unremarkable  - cont to trend temps, CBC  - discussed repeat CXR, but pt denies SOB/ cough/ sputum--> deferred for now  - groins sites without S/S infection    Anemia  - admit hgb: 11.9, hgb s/p TAVR 9.2  - hgb trend: 8.9 (8.7, 8.4 8.9, 7.8 8.6, 8.7)  - iron studies: iron 19, TIBC 284, ferritin 92, % sat 7  - Vit. B12/Folate wnl  - initiated IV iron 200mg x 5 days (11/3-11/7)  - bowel regimen added iso constipation likely 2/2 iron supplements  - consider waiting to initiate PO iron until constipation fully resolves    Urinary retention  Hematuria  - 11/6 RN reported new dysuria and hematuria PVR 472cc. Minaya catheter placed, repeat UA obtained and Flomax initiated   - 11/7 minaya in place, linette colored urine on assessment this morning  - per urology: as long as urine drains well through catheter without passage of large clots or acute drops in hemoglobin, some hematuria is to be expected with patient on Plavix and catheter in place. Recommend a repeat trial of void at midnight on the day of discharge, if the patient voids without elevated PVR (>250cc or symptomatic) can be discharged without a catheter, if patient is unable to void or continues to retain can be discharged with the catheter and follow up with urology for an outpatient trial of void   - repeat UA negative for UTI  - urojet ordered PRN for urethral discomfort    Constipation  - 11/5 Reported he had not had a BM since admission   - KUB 11/6: Moderate amount of stool in nondistended loops of small large bowel correlate with a component of fecal impaction. Severe atherosclerotic changes of the abdominal aorta.   - wife and RN confirm 2 moderate sized bowel movements 11/6 following  KUB  - continue bowel regimen   - low threshold for suppository or enema if patient does not have bowel movement tomorrow    GI ppx  - cont pantoprazole 40mg PO daily     Chronic Arthritis  - Family also noted concerns about his knees bilaterally, stating he has a history of arthritis (unsure if rheumatologic)  - has history of injections by orthopedics as an outpatient  - Family asked if patient can have his knees drained as this has previously helped with his ambulation. Patient initially denied pain of his knees, although when prompted by family, he endorsed some pain with palpation  - will continue to reassess knees daily--> no complaints today  - if concern for septic arthritis, will intervene while inpatient     Dispo  PT rec Acute Rehab, updated PT/OT recs for AR completed 11/6  Precert sent for AR 11/6 evening    DVT ppx: subq Heparin    Seen and discussed with Dr. Ortiz    Peripheral IV 10/02/23 20 G Right Antecubital (Active)   Site Assessment Clean;Dry;Intact 11/02/23 1532   Dressing Status Clean;Dry 11/02/23 1532   Number of days: 31       Peripheral IV 10/30/23 20 G Right;Anterior Forearm (Active)   Site Assessment Clean;Dry;Intact 11/02/23 1532   Dressing Status Clean;Dry 11/02/23 1532   Number of days: 3       Code Status:  Full Code    I spent 30 minutes in the professional and overall care of this patient.    Mook Monterroso PA-C

## 2023-11-07 NOTE — PROGRESS NOTES
Structural Heart Progress Note    HPI   Javier Tate is a 88 y.o. male with severe symptomatic Aortic Stenosis who presented on 10/30/23 for elective TAVR.    Patient Active Problem List    Diagnosis Date Noted    Severe aortic stenosis 10/30/2023    S/p TAVR (transcatheter aortic valve replacement), bioprosthetic 10/30/2023    Nonrheumatic aortic valve stenosis 10/20/2023      LOS: 8 days     Objective     Vitals 24 hour ranges:  Temp:  [36.3 °C (97.3 °F)-37.2 °C (98.9 °F)] 37.2 °C (98.9 °F)  Heart Rate:  [71-80] 76  Resp:  [16-19] 18  BP: (112-139)/(52-72) 131/52  SpO2:  [93 %-97 %] 95 %  Medical Gas Therapy: None (Room air)  O2 Delivery Method: Non-rebreather mask     Intake/Output last 3 Shifts:    Intake/Output Summary (Last 24 hours) at 11/7/2023 1231  Last data filed at 11/7/2023 0516  Gross per 24 hour   Intake 120 ml   Output 675 ml   Net -555 ml          Lab Results  Results for orders placed or performed during the hospital encounter of 10/30/23 (from the past 24 hour(s))   CBC   Result Value Ref Range    WBC 8.9 4.4 - 11.3 x10*3/uL    nRBC 0.0 0.0 - 0.0 /100 WBCs    RBC 2.92 (L) 4.50 - 5.90 x10*6/uL    Hemoglobin 8.8 (L) 13.5 - 17.5 g/dL    Hematocrit 29.5 (L) 41.0 - 52.0 %     (H) 80 - 100 fL    MCH 30.1 26.0 - 34.0 pg    MCHC 29.8 (L) 32.0 - 36.0 g/dL    RDW 13.7 11.5 - 14.5 %    Platelets 196 150 - 450 x10*3/uL   Renal function panel   Result Value Ref Range    Glucose 212 (H) 74 - 99 mg/dL    Sodium 138 136 - 145 mmol/L    Potassium 4.0 3.5 - 5.3 mmol/L    Chloride 100 98 - 107 mmol/L    Bicarbonate 27 21 - 32 mmol/L    Anion Gap 15 10 - 20 mmol/L    Urea Nitrogen 34 (H) 6 - 23 mg/dL    Creatinine 1.07 0.50 - 1.30 mg/dL    eGFR 68 >60 mL/min/1.73m*2    Calcium 8.4 (L) 8.6 - 10.6 mg/dL    Phosphorus 2.5 2.5 - 4.9 mg/dL    Albumin 2.9 (L) 3.4 - 5.0 g/dL   Magnesium   Result Value Ref Range    Magnesium 2.12 1.60 - 2.40 mg/dL   Protime-INR   Result Value Ref Range    Protime 13.4 (H) 9.8 - 12.8  seconds    INR 1.2 (H) 0.9 - 1.1   Hemoglobin A1c   Result Value Ref Range    Hemoglobin A1C 5.0 see below %    Estimated Average Glucose 97 Not Established mg/dL           Medications:  Scheduled medications  aspirin, 81 mg, oral, Daily  clopidogrel, 75 mg, oral, Daily  ezetimibe, 10 mg, oral, Daily  [Held by provider] ferrous sulfate, 1 tablet, oral, Daily with breakfast  furosemide, 20 mg, oral, Daily  heparin (porcine), 5,000 Units, subcutaneous, q8h  lidocaine, 1 patch, transdermal, Daily  pantoprazole, 40 mg, oral, Daily before breakfast  polyethylene glycol, 17 g, oral, Daily  sennosides, 1 tablet, oral, BID  sotalol, 80 mg, oral, BID  tamsulosin, 0.4 mg, oral, Daily      Continuous medications     PRN medications  PRN medications: acetaminophen, diclofenac sodium, lidocaine, melatonin       Physical Exam:  Constitutional: Well developed, awake/alert/oriented x1 (to self, reoriented to place and time), no distress, cooperative  Eyes: PERRL, EOMI, clear sclera  ENMT: mucous membranes moist  Head/Neck: Neck supple, No JVD  Respiratory/Thorax: decreased chest expansion, thorax symmetric, lung sounds diminished throughout  Cardiovascular: Regular rate and rhythm, no murmurs, normal S1 and S2  Gastrointestinal: Nondistended, soft, non-tender, no rebound tenderness or guarding, no masses palpable, no organomegaly, +BS  Extremities: no BLE edema, no cyanosis, bilat groins c/d/i with dsd no s/s of hematoma  Neurological: alert and oriented x1 (to self, reoriented to place and time), will follow commands with all extremities, displaying receptive and expressive aphasia, right sided weakness (upper and lower)  Psychological: Appropriate mood and behavior   Skin: Warm and dry, intact.       Assessment/Plan   Javier Tate is an 87 yo M with a PMHx significant for permanent A-fib (on warfarin), CAD (s/p PCI to LMCA and proximal LAD on 7/17/2023 on Plavix), HTN, SSS with 2nd degree AV block, PPM (2014), CKD, ANTONIO, PAD,  and severe Aortic Stenosis.  Pt is now s/p TAVR - Evolut FX 34 mm on 10/30/23 with Dr. Umana and Dr. Stern.  Echo with bubbles in the LV during procedure which disappeared when IV medication stopped. Intraop echo showed mild PVL, no PC effusion, and resolution of severe AS.  Confusion and RUE shaking/tremor noted post procedure.  Neurology evaluated patient at 1338 - their initial assessment showed mild aphasia and inability to hold legs up against gravity - no definitive evidence of stroke.  BAT called again at 1828 for worsening dysmetria and aphasia.  NIHSS 5.  Neurology recommending brain imaging, lipid panel and HbA1c.  CTA showed left parietal acute infarct.    POD 1 - Overnight pt remained with confusion and aphasia remained unchanged.  Patient denies SOB, CP, ABD pain and numbness/tingling in BLE.  Neuro assessment significant for expressive aphasia, A+O x 1 (disoriented to place and time), L>R LE strength, and dysmetria noted on finger to nose test.  Patient mildly agitated and restless.  Groin sites without tenderness, no s/s of bleeding/hematoma with c/d/I dressing.  LS CTA.  +2 B/L LE pitting edema.  POD 1 ECHO showed EF 60-65%, negative bubble study, triv AI, grad 12.1/6.0, mod MR (was mod/severe), mod TR, elevated RVSP, dilated IVC, and triv PC effusion    11/2/23 - Pt euvolemic on assessment with no BLE edema, lungs with slightly diminished bases, sats stable on RA.  Pt denies CP, SOB, abd pain, groin pain.  Groin sites healing well without signs of infection.  Neurologically pt continues to be A&Ox1 (self), follows commands, displays expressive aphasia, right sided deficit remains.    11/3/23 - No change in neuro status.  Slightly more FVO'ed today with diminished lung sounds diminished throughout, pulsating JVD fluid wave (not present yesterday).  Wife states that she noticed he seemed to be a little more short of breath today.    11/7/23 - Wife reports pt had a period last night where he was  A&Ox4 (talking about their grandchildren), this AM he has returned to A&O x1-2.  MAEx4 equally.  Pt reported walked with walker.  Remains HDS.  Remains euvolemic.    Structural Heart Plan:  - Continue DAPT with ASA 81 mg PO daily and Plavix 75 mg PO daily (s/p PCI in July 2023)  - Restart Coumadin in 2 weeks per neuro/stroke for Afib (cont Plavix with Warfarin.  No ASA, NO TRIPLE THERAPY)  - Cont aggressive PT/OT/Speech  - OOB -> chair with all meals  - Ok with discharge when primary team deems medically ready and pt has a bed at acute rehab/snf  - follow up with Structural Heart clinic in 1 month and 1 year  - f/w Primary Cards as scheduled or in 6-10 weeks  - f/w Jose Knight MD in 1-2 weeks  - pt will be given Lab recs (1 week) and ECHO rec (1 month) by RN at discharge (orders have been placed)  - Continue to assess LAAO and PADMAJA candidacy as outpatient      D/w Dr. Umana and HVI team    I spent 25 minutes in the professional and overall care of this patient.     Ronnie Chester MSN, AGACNP-BC  Acute Care Nurse Practitioner  Structural Heart / TAVR Team  Structural Pager: [phone]  (Nights) [facility] fellow pager: [phone]

## 2023-11-08 LAB
ABO GROUP (TYPE) IN BLOOD: NORMAL
ALBUMIN SERPL BCP-MCNC: 2.9 G/DL (ref 3.4–5)
ANION GAP SERPL CALC-SCNC: 13 MMOL/L (ref 10–20)
ANTIBODY SCREEN: NORMAL
APPEARANCE UR: ABNORMAL
BACTERIA #/AREA URNS AUTO: ABNORMAL /HPF
BILIRUB UR STRIP.AUTO-MCNC: NEGATIVE MG/DL
BUN SERPL-MCNC: 25 MG/DL (ref 6–23)
CALCIUM SERPL-MCNC: 8.3 MG/DL (ref 8.6–10.6)
CHLORIDE SERPL-SCNC: 97 MMOL/L (ref 98–107)
CO2 SERPL-SCNC: 28 MMOL/L (ref 21–32)
COLOR UR: YELLOW
CREAT SERPL-MCNC: 0.9 MG/DL (ref 0.5–1.3)
GFR SERPL CREATININE-BSD FRML MDRD: 83 ML/MIN/1.73M*2
GLUCOSE SERPL-MCNC: 179 MG/DL (ref 74–99)
GLUCOSE UR STRIP.AUTO-MCNC: NEGATIVE MG/DL
INR PPP: 1.1 (ref 0.9–1.1)
KETONES UR STRIP.AUTO-MCNC: NEGATIVE MG/DL
LACTATE SERPL-SCNC: 0.9 MMOL/L (ref 0.4–2)
LEUKOCYTE ESTERASE UR QL STRIP.AUTO: ABNORMAL
MAGNESIUM SERPL-MCNC: 2.01 MG/DL (ref 1.6–2.4)
NITRITE UR QL STRIP.AUTO: NEGATIVE
PH UR STRIP.AUTO: 6 [PH]
PHOSPHATE SERPL-MCNC: 2.1 MG/DL (ref 2.5–4.9)
POTASSIUM SERPL-SCNC: 3.7 MMOL/L (ref 3.5–5.3)
PROT UR STRIP.AUTO-MCNC: ABNORMAL MG/DL
PROTHROMBIN TIME: 12.1 SECONDS (ref 9.8–12.8)
RBC # UR STRIP.AUTO: ABNORMAL /UL
RBC #/AREA URNS AUTO: >20 /HPF
RH FACTOR (ANTIGEN D): NORMAL
SODIUM SERPL-SCNC: 134 MMOL/L (ref 136–145)
SP GR UR STRIP.AUTO: 1.02
URATE SERPL-MCNC: 6.3 MG/DL (ref 4–7.5)
UROBILINOGEN UR STRIP.AUTO-MCNC: 4 MG/DL
WBC #/AREA URNS AUTO: >50 /HPF

## 2023-11-08 PROCEDURE — 2500000001 HC RX 250 WO HCPCS SELF ADMINISTERED DRUGS (ALT 637 FOR MEDICARE OP)

## 2023-11-08 PROCEDURE — 83735 ASSAY OF MAGNESIUM: CPT

## 2023-11-08 PROCEDURE — 2500000004 HC RX 250 GENERAL PHARMACY W/ HCPCS (ALT 636 FOR OP/ED): Performed by: NURSE PRACTITIONER

## 2023-11-08 PROCEDURE — 1200000002 HC GENERAL ROOM WITH TELEMETRY DAILY

## 2023-11-08 PROCEDURE — 83605 ASSAY OF LACTIC ACID: CPT

## 2023-11-08 PROCEDURE — 96372 THER/PROPH/DIAG INJ SC/IM: CPT

## 2023-11-08 PROCEDURE — 87075 CULTR BACTERIA EXCEPT BLOOD: CPT

## 2023-11-08 PROCEDURE — 97116 GAIT TRAINING THERAPY: CPT | Mod: GP

## 2023-11-08 PROCEDURE — 36415 COLL VENOUS BLD VENIPUNCTURE: CPT

## 2023-11-08 PROCEDURE — 2500000002 HC RX 250 W HCPCS SELF ADMINISTERED DRUGS (ALT 637 FOR MEDICARE OP, ALT 636 FOR OP/ED)

## 2023-11-08 PROCEDURE — 81001 URINALYSIS AUTO W/SCOPE: CPT

## 2023-11-08 PROCEDURE — 2500000004 HC RX 250 GENERAL PHARMACY W/ HCPCS (ALT 636 FOR OP/ED)

## 2023-11-08 PROCEDURE — 86850 RBC ANTIBODY SCREEN: CPT

## 2023-11-08 PROCEDURE — 85610 PROTHROMBIN TIME: CPT

## 2023-11-08 PROCEDURE — 99233 SBSQ HOSP IP/OBS HIGH 50: CPT | Performed by: INTERNAL MEDICINE

## 2023-11-08 PROCEDURE — 80069 RENAL FUNCTION PANEL: CPT

## 2023-11-08 RX ORDER — AMOXICILLIN 250 MG
2 CAPSULE ORAL 2 TIMES DAILY
Status: DISCONTINUED | OUTPATIENT
Start: 2023-11-08 | End: 2023-11-09 | Stop reason: HOSPADM

## 2023-11-08 RX ORDER — BISACODYL 10 MG/1
10 SUPPOSITORY RECTAL DAILY
Status: DISCONTINUED | OUTPATIENT
Start: 2023-11-08 | End: 2023-11-09 | Stop reason: HOSPADM

## 2023-11-08 RX ORDER — POTASSIUM CHLORIDE 20 MEQ/1
20 TABLET, EXTENDED RELEASE ORAL ONCE
Status: COMPLETED | OUTPATIENT
Start: 2023-11-08 | End: 2023-11-08

## 2023-11-08 RX ADMIN — FUROSEMIDE 20 MG: 20 TABLET ORAL at 09:21

## 2023-11-08 RX ADMIN — ASPIRIN 81 MG: 81 TABLET, COATED ORAL at 09:22

## 2023-11-08 RX ADMIN — HEPARIN SODIUM 5000 UNITS: 5000 INJECTION INTRAVENOUS; SUBCUTANEOUS at 01:30

## 2023-11-08 RX ADMIN — EZETIMIBE 10 MG: 10 TABLET ORAL at 09:29

## 2023-11-08 RX ADMIN — SENNOSIDES AND DOCUSATE SODIUM 2 TABLET: 8.6; 5 TABLET ORAL at 13:33

## 2023-11-08 RX ADMIN — SENNOSIDES 8.6 MG: 8.6 TABLET, FILM COATED ORAL at 09:21

## 2023-11-08 RX ADMIN — POLYETHYLENE GLYCOL 3350 17 G: 17 POWDER, FOR SOLUTION ORAL at 09:29

## 2023-11-08 RX ADMIN — BISACODYL 10 MG: 10 SUPPOSITORY RECTAL at 13:33

## 2023-11-08 RX ADMIN — HEPARIN SODIUM 5000 UNITS: 5000 INJECTION INTRAVENOUS; SUBCUTANEOUS at 09:22

## 2023-11-08 RX ADMIN — TAMSULOSIN HYDROCHLORIDE 0.4 MG: 0.4 CAPSULE ORAL at 09:21

## 2023-11-08 RX ADMIN — PANTOPRAZOLE SODIUM 40 MG: 40 TABLET, DELAYED RELEASE ORAL at 09:22

## 2023-11-08 RX ADMIN — Medication: at 13:33

## 2023-11-08 RX ADMIN — HEPARIN SODIUM 5000 UNITS: 5000 INJECTION INTRAVENOUS; SUBCUTANEOUS at 18:15

## 2023-11-08 RX ADMIN — POTASSIUM CHLORIDE 20 MEQ: 1500 TABLET, EXTENDED RELEASE ORAL at 09:22

## 2023-11-08 RX ADMIN — SENNOSIDES AND DOCUSATE SODIUM 2 TABLET: 8.6; 5 TABLET ORAL at 20:04

## 2023-11-08 RX ADMIN — SOTALOL HYDROCHLORIDE 80 MG: 80 TABLET ORAL at 20:04

## 2023-11-08 RX ADMIN — SOTALOL HYDROCHLORIDE 80 MG: 80 TABLET ORAL at 09:00

## 2023-11-08 RX ADMIN — CLOPIDOGREL BISULFATE 75 MG: 75 TABLET ORAL at 09:22

## 2023-11-08 ASSESSMENT — COGNITIVE AND FUNCTIONAL STATUS - GENERAL
MOVING TO AND FROM BED TO CHAIR: A LITTLE
MOBILITY SCORE: 15
WALKING IN HOSPITAL ROOM: A LOT
MOBILITY SCORE: 10
STANDING UP FROM CHAIR USING ARMS: A LITTLE
CLIMB 3 TO 5 STEPS WITH RAILING: TOTAL
WALKING IN HOSPITAL ROOM: TOTAL
CLIMB 3 TO 5 STEPS WITH RAILING: TOTAL
DRESSING REGULAR UPPER BODY CLOTHING: A LOT
MOVING TO AND FROM BED TO CHAIR: A LOT
TURNING FROM BACK TO SIDE WHILE IN FLAT BAD: A LITTLE
DRESSING REGULAR LOWER BODY CLOTHING: A LOT
EATING MEALS: A LITTLE
PERSONAL GROOMING: A LOT
MOVING FROM LYING ON BACK TO SITTING ON SIDE OF FLAT BED WITH BEDRAILS: A LITTLE
DAILY ACTIVITIY SCORE: 13
HELP NEEDED FOR BATHING: A LOT
TURNING FROM BACK TO SIDE WHILE IN FLAT BAD: A LOT
MOVING FROM LYING ON BACK TO SITTING ON SIDE OF FLAT BED WITH BEDRAILS: A LOT
STANDING UP FROM CHAIR USING ARMS: A LOT
TOILETING: A LOT

## 2023-11-08 ASSESSMENT — PAIN SCALES - GENERAL
PAINLEVEL_OUTOF10: 0 - NO PAIN

## 2023-11-08 ASSESSMENT — PAIN - FUNCTIONAL ASSESSMENT
PAIN_FUNCTIONAL_ASSESSMENT: 0-10
PAIN_FUNCTIONAL_ASSESSMENT: 0-10

## 2023-11-08 NOTE — CARE PLAN
The patient's goals for the shift include      The clinical goals for the shift include No falls      Problem: Pain - Adult  Goal: Verbalizes/displays adequate comfort level or baseline comfort level  Outcome: Progressing     Problem: Safety - Adult  Goal: Free from fall injury  Outcome: Progressing     Problem: Discharge Planning  Goal: Discharge to home or other facility with appropriate resources  Outcome: Progressing     Problem: Chronic Conditions and Co-morbidities  Goal: Patient's chronic conditions and co-morbidity symptoms are monitored and maintained or improved  Outcome: Progressing     Problem: Skin  Goal: Decreased wound size/increased tissue granulation at next dressing change  Outcome: Progressing  Goal: Participates in plan/prevention/treatment measures  Outcome: Progressing  Goal: Prevent/manage excess moisture  Outcome: Progressing  Goal: Prevent/minimize sheer/friction injuries  Outcome: Progressing  Goal: Promote/optimize nutrition  Outcome: Progressing  Goal: Promote skin healing  Outcome: Progressing     Problem: Fall/Injury  Goal: Not fall by end of shift  Outcome: Progressing  Goal: Be free from injury by end of the shift  Outcome: Progressing  Goal: Verbalize understanding of personal risk factors for fall in the hospital  Outcome: Progressing  Goal: Verbalize understanding of risk factor reduction measures to prevent injury from fall in the home  Outcome: Progressing  Goal: Use assistive devices by end of the shift  Outcome: Progressing  Goal: Pace activities to prevent fatigue by end of the shift  Outcome: Progressing     Problem: Pain  Goal: Takes deep breaths with improved pain control throughout the shift  Outcome: Progressing  Goal: Turns in bed with improved pain control throughout the shift  Outcome: Progressing  Goal: Walks with improved pain control throughout the shift  Outcome: Progressing  Goal: Performs ADL's with improved pain control throughout shift  Outcome:  Progressing  Goal: Participates in PT with improved pain control throughout the shift  Outcome: Progressing  Goal: Free from opioid side effects throughout the shift  Outcome: Progressing  Goal: Free from acute confusion related to pain meds throughout the shift  Outcome: Progressing

## 2023-11-08 NOTE — PROGRESS NOTES
Subjective Data:  Patient sitting up in chair, NAD. Reports random, intermittent, sharp stabbing pain to right heel. Pt/spouse deny any trauma, no visible injury. Will use Tylenol and Hot packs for now. Patient's wife endorses small BM's, but not substantial. A&O x2 this morning, remains aphasic. Frustrated that discharge is now delayed due to elevated WBC.      - per wife, pt had 2 small BM's yesterday, remains constipated, increase bowel regimen   - WBC today 19.1, afebrile, denies chills, groin sites clean, no s/s of infection  - repeat BC x2, repeat UA/UC collected, cont to trend labs, lactate pending  - pt to have T[facility] tonight [facility], nursing aware  - per TCC, pre-cert obtained today, plan for dc tomorrow pending labs     Overnight Events:    No acute events overnight    Objective Data:  Last Recorded Vitals:  Vitals:    11/08/23 0117 11/08/23 0509 11/08/23 0900 11/08/23 1357   BP: 117/63 128/70 135/56 117/64   BP Location: Right arm Right arm     Patient Position: Lying Lying     Pulse: 71 87 71 74   Resp: 17 17 16 16   Temp: 36.7 °C (98.1 °F) 36.7 °C (98.1 °F) 36.5 °C (97.7 °F) 36.9 °C (98.4 °F)   TempSrc: Temporal Temporal Temporal    SpO2: 96% 92% 97% 96%   Weight:  73.5 kg (162 lb 0.6 oz)     Height:           Last Labs:  Results for orders placed or performed during the hospital encounter of 10/30/23 (from the past 24 hour(s))   CBC   Result Value Ref Range    WBC 19.1 (H) 4.4 - 11.3 x10*3/uL    nRBC 0.0 0.0 - 0.0 /100 WBCs    RBC 2.73 (L) 4.50 - 5.90 x10*6/uL    Hemoglobin 8.2 (L) 13.5 - 17.5 g/dL    Hematocrit 26.7 (L) 41.0 - 52.0 %    MCV 98 80 - 100 fL    MCH 30.0 26.0 - 34.0 pg    MCHC 30.7 (L) 32.0 - 36.0 g/dL    RDW 13.8 11.5 - 14.5 %    Platelets 210 150 - 450 x10*3/uL   Renal function panel   Result Value Ref Range    Glucose 118 (H) 74 - 99 mg/dL    Sodium 138 136 - 145 mmol/L    Potassium 3.6 3.5 - 5.3 mmol/L    Chloride 99 98 - 107 mmol/L    Bicarbonate 29 21 - 32 mmol/L    Anion Gap 14 10 - 20  mmol/L    Urea Nitrogen 27 (H) 6 - 23 mg/dL    Creatinine 1.03 0.50 - 1.30 mg/dL    eGFR 71 >60 mL/min/1.73m*2    Calcium 8.3 (L) 8.6 - 10.6 mg/dL    Phosphorus 2.4 (L) 2.5 - 4.9 mg/dL    Albumin 2.9 (L) 3.4 - 5.0 g/dL   Magnesium   Result Value Ref Range    Magnesium 2.15 1.60 - 2.40 mg/dL   Protime-INR   Result Value Ref Range    Protime 12.5 9.8 - 12.8 seconds    INR 1.1 0.9 - 1.1   Blood Culture    Specimen: Peripheral Venipuncture; Blood culture   Result Value Ref Range    Blood Culture Loaded on Instrument - Culture in progress    Blood Culture    Specimen: Peripheral Venipuncture; Blood culture   Result Value Ref Range    Blood Culture Loaded on Instrument - Culture in progress         HGBA1C   Date/Time Value Ref Range Status   11/06/2023 06:45 PM 5.0 see below % Final      Last I/O:  I/O last 3 completed shifts:  In: - (0 mL/kg)   Out: 1200 (16.3 mL/kg) [Urine:1200 (0.5 mL/kg/hr)]  Weight: 73.5 kg     Past Cardiology Tests (Last 3 Years):    Echo:  Transthoracic Echo (TTE) Complete 10/31/2023   1. Left ventricular systolic function is normal with a 60-65% estimated ejection fraction.   2. Spectral Doppler shows a pseudonormal pattern of left ventricular diastolic filling.   3. Abnormal septal motion consistent with left bundle branch block.   4. The left atrium is severely dilated.   5. The right atrium is severely dilated.   6. There is moderate to severe mitral annular calcification.   7. Moderate mitral valve regurgitation.   8. Moderate tricuspid regurgitation visualized.   9. There is a transcatheter aortic valve replacement.  10. S/p 34mm Medtronic Evolut TAVR with gradients of 12.1/6mmHg and trace perivalvular AI. There is correct TAVR placement with trace periprosthetic regurgitation.  11. In comparison to study 10/30/2023, there has been slight increase in TAVR gradients, the EF has improved from previous 40-45%( mildly reduced) to now normal at 60-65%. The degreee of MR appears to have decreased  somewhat from moderate to severe to at least moderate( proximal flow convergence appears a bit smaller today and there is not clear systolic flow reversal in the PVs which was seen yesterday.  12. Moderately elevated right ventricular systolic pressure.  13. Poorly visualized anatomical structures due to suboptimal image quality.  14. Moderate to severre MAC with mildly thickened MV leaflets with at least moderate centrally directed. Note that the degree of MR could be underestamated due to shielding from the MAC. Note that the proximal flow convergence zone is not large.    Transthoracic Echo (TTE) Limited 10/30/2023   1. Left ventricular systolic function is mildly decreased with a 40-45% estimated ejection fraction.   2. There is global hypokinesis of the left ventricle with minor regional variations.   3. The left atrium is severely dilated.   4. Moderate to severe mitral valve regurgitation.   5. Severe aortic valve stenosis.   6. Mild to moderate aortic valve regurgitation.   7. There is moderate to severe aortic valve cusp calcification.   8. There is moderate to severe mitral annular calcification.   9. There is aortic valve annular calcification.  10. Moderate to severe tricuspid regurgitation visualized.  11. Moderately elevated right ventricular systolic pressure.  12. Post TAVR - There was an improvement in aortic transvalvular gradients (now mean gradient 3 mmHg, peak gradient 6 mmHg, DI 0.7). There is a mild-to moderate chel-prosthetic regurgitation with one anterior and one posterior jet (A<P).  13. No formal bubble study was performed however bubbles are seen in the LV and LA post TAVR suggesting presence of L-R shunt. Recommend formal bubble study.      Inpatient Medications:  Scheduled medications   Medication Dose Route Frequency    aspirin  81 mg oral Daily    bisacodyl  10 mg rectal Daily    clopidogrel  75 mg oral Daily    ezetimibe  10 mg oral Daily    [Held by provider] ferrous sulfate  1  tablet oral Daily with breakfast    furosemide  20 mg oral Daily    heparin (porcine)  5,000 Units subcutaneous q8h    lidocaine  1 patch transdermal Daily    pantoprazole  40 mg oral Daily before breakfast    polyethylene glycol  17 g oral Daily    sennosides  1 tablet oral BID    sennosides-docusate sodium  2 tablet oral BID    sotalol  80 mg oral BID    tamsulosin  0.4 mg oral Daily     PRN medications   Medication    acetaminophen    diclofenac sodium    lidocaine    melatonin     Continuous Medications   Medication Dose Last Rate     Physical Exam:  GENERAL: expressive aphasia (improving), alert and oriented X2-3   EYES: EOMI  NECK: unable to appreciate JVD sitting at 90 degrees  LUNGS: posterior bases diminished, on room air  CARDIAC: irregularly irregular; AV paced and intermittent Afib on tele; no murmur  ABDOMEN: nondistended, nontender, +BS  EXTREMITIES: trace LE edema; RUE with reduced ROM-- improving    SKIN: right groin site CHRISTIANO, no oozing or hematoma; excoriation and reddened; left groin site CHRISTIANO, intact with ecchymosis but no hematoma  NEURO: receptive aphasia, uncoordinated movements with tasks; unable to complete finger to nose test as appeared to not understand the requests    Assessment/Plan   Javier Tate is an 88M w/ severe aortic stenosis s/p 10/30 TAVR p/w disorientation, expressive aphasia, receptive aphasia. 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 Toxic-metabolic encephalopathy and digoxin toxicity remains on the differential. Transferred to HVI service for further management.      Left parietal lobe stroke  Disorientation  Receptive/ Expressive Aphasia  - confusion and RUE shaking/tremor noted post procedure  - Neurology consulted, initial assessment showed mild aphasia and inability to hold legs up against gravity, no definitive evidence of stroke  -BAT called again for worsening dysmetria and aphasia.  NIHSS 5.  Neurology recommending  brain imaging, lipid panel and HbA1c.   - CTA showed left parietal acute infarct.   - delirium precautions  - Neurology recommendations: no need for brain MRI, continue DAPT, follow up with vascular neurology in 3 mo, s/o.   - per Neurology, due to a high risk of bleeding, recommended Plavix monotherapy ( preference) until resuming anticoagulation at 2 wks post stroke   - per : with fresh PCI in July, recommend to cont DAPT until warfarin can be started 11/13 (2 weeks post stroke), then cont with Plavix + Warfarin & discontinuing ASA 81  - neurology agreeable and okay with  plan  - Tylenol PRN for pain  - cont PT/OT/SLP Consult, OOB to chair as tolerated  - Sleep/wake cycle normalization    Severe Aortic Stenosis s/p TAVR   - 10/30/23 s/p TAVR - Evolut FX 34 mm  with Dr. Umana and Dr. Stern.   - Echo with bubbles in the LV during procedure which disappeared when IV medication stopped. Intraop echo showed mild PVL, no PC effusion, and resolution of severe AS.   - POD 1 ECHO showed EF 60-65%, negative bubble study, triv AI, grad 12.1/6.0, mod MR (was mod/severe), mod TR, elevated RVSP, dilated IVC, and triv PC effusion   - continue PO lasix 20mg daily  - per Structural Heart, cont DAPT with ASA 81 mg and Plavix 75 mg PO daily, restart Coumadin per neuro/stroke (2 weeks post stroke-- 11/13), then cont Plavix with Warfarin, NO TRIPLE THERAPY  - Continue to assess LAAO and PADMAJA candidacy as outpatient    Afib  Sick Sinus Syndrome  hx of Second Degree AV block   - s/p pacemaker  - cont digoxin 62.5mcg PO every day, Dig level 0.7 (11/2)  - holding warfarin d/t potential hemorrhagic conversion of AC  - per Neurology, hold off on resuming AC until 2 wks post stroke (11/13)  - 11/6 Dr. Ortiz requested EP input on atrial fib med regimen: per Dr. Landry, recommend continuing sotalol and digoxin as this was initiated as an outpatient and renal function remains stable  - EP reassessed 11/7: rec stopping digoxin,  referral to follow up with EP at   - cont ASA 81 mg, Clopidogrel 75mg, sotalol 80mg PO BID    HTN  - SBP last 24 hrs: 105-135  - holding home lisinopril I/s/o stroke to promote adequate perfusion   - consider anti- htn agents as needed    Mild hyperglycemia  - wife declines any history of diabetes  - BG elevated, ranging 148-223 throughout admission  - Hgb A1c 5.0, average BG 97  - c/w Glucerna oral nutritional supplements   - follow up with PCP for further monitoring    Concern for infection  Leukocytosis  - ongoing borderline temps  - WBC today: 19.1 (8.9, 8.8, 10.3, 11.4, 11.4, 9.0, 9.2)  - 11/2 BC x2 negative & final, UA from 11/2 & 11/6 unremarkable  - afebrile, denies chills, groin sites clean, no s/s of infection  - repeat BC x2, repeat UA/UC collected, cont to trend labs, lactate pending  - cont to trend temps, CBC    Anemia  - admit hgb: 11.9, hgb s/p TAVR 9.2  - hgb trend: 8.9 (8.7, 8.4 8.9, 7.8 8.6, 8.7)  - iron studies: iron 19, TIBC 284, ferritin 92, % sat 7  - Vit. B12/Folate wnl  - s/p  IV iron 200mg x 5 days (11/3-11/7)  - waiting to initiate PO iron until constipation fully resolves    Urinary retention  Hematuria  - 11/6 RN reported new dysuria and hematuria PVR 472cc. Hagan catheter placed, repeat UA obtained and Flomax initiated   - per Urology: as long as urine drains well through catheter without passage of large clots or acute drops in hemoglobin, some hematuria is to be expected with patient on Plavix and catheter in place. Recommend a repeat TOV at [facility] on the day of discharge, if the patient voids without elevated PVR (>250cc or symptomatic) can be discharged without a catheter, if patient is unable to void or continues to retain can be discharged with the catheter and follow up with urology for an outpatient trial of void   - urojet ordered PRN for urethral discomfort  - pt to have TOV tonight at [facility], nursing aware    Constipation  - 11/5 Reported he had not had a BM since admission   - KUB  11/6: Mod amount of stool in nondistended loops of small large bowel correlate with a component of fecal impaction. Severe atherosclerotic changes of the abdominal aorta.   - bowel regimen added iso constipation likely 2/2 iron supplements  - per wife, pt had 2 small BM's yesterday, remains constipated  - increase bowel regimen, scheduled 10 mg Bisacodyl Suppository daily, Linda-Colace 2 tabs BID, Miralax daily  - PRN enema added if still unable to have BM    GI ppx  - cont pantoprazole 40mg PO daily     Chronic Arthritis  Heel Pain  - Family also noted concerns about his knees bilaterally, stating he has a history of arthritis (unsure if rheumatologic)  - has history of injections by orthopedics as an outpatient  - Family asked if patient can have his knees drained as this has previously helped with his ambulation. Patient initially denied pain of his knees, although when prompted by family, he endorsed some pain with palpation  - will continue to reassess knees daily--> no complaints today  - if concern for septic arthritis, will intervene while inpatient   - reports random, intermittent, sharp stabbing pain to right heel. Pt/spouse deny any trauma, no visible injury. Will use Tylenol and Hot packs for now.     Dispo  PT rec Acute Rehab, updated PT/OT recs for AR completed 11/6  - per TCC, pre-cert obtained today, plan for dc tomorrow pending labs    DVT ppx: subq Heparin    Seen and discussed with Dr. Ortiz    Peripheral IV 10/02/23 20 G Right Antecubital (Active)   Site Assessment Clean;Dry;Intact 11/02/23 1532   Dressing Status Clean;Dry 11/02/23 1532   Number of days: 31       Peripheral IV 10/30/23 20 G Right;Anterior Forearm (Active)   Site Assessment Clean;Dry;Intact 11/02/23 1532   Dressing Status Clean;Dry 11/02/23 1532   Number of days: 3       Code Status:  Full Code    I spent 30 minutes in the professional and overall care of this patient.    Rosa Isela Munson, APRN-CNP

## 2023-11-08 NOTE — PROGRESS NOTES
Patient Care Navigator Note - [facility] arranged [facility] for a 4:30 pm  via stretcher  Pt will be discharged to [facility].   TCC , nurse informed.   Nurse report number is [phone]      Samantha Simon

## 2023-11-08 NOTE — PROGRESS NOTES
11/8/23  1030 Transitional Care Coordinator   Per NP patient's WBC's are elevated and team would like to recheck labs prior to discharge. Facility was notified. Will tentatively plan for discharge to [facility] tomorrow. Viola Cole RN

## 2023-11-08 NOTE — PROGRESS NOTES
Physical Therapy    Physical Therapy Treatment    Patient Name: Javier Tate  MRN: [id number]  Today's Date: 11/8/2023  Time Calculation  Start Time: 1628  Stop Time: 1637  Time Calculation (min): 9 min       Assessment/Plan   PT Assessment  PT Assessment Results: Decreased strength, Decreased range of motion, Decreased endurance, Impaired balance, Decreased mobility, Decreased coordination, Decreased cognition, Orthopedic restrictions  Rehab Prognosis: Good  Barriers to Discharge: none  Evaluation/Treatment Tolerance: Patient limited by fatigue  Medical Staff Made Aware: Yes  Strengths: Attitude of self, Support of extended family/friends  Barriers to Participation:  (none)  End of Session Communication: Bedside nurse  End of Session Patient Position: Up in chair, Alarm on  PT Plan  Inpatient/Swing Bed or Outpatient: Inpatient  PT Plan  Treatment/Interventions: Bed mobility, Transfer training, Gait training, Balance training, Neuromuscular re-education, Strengthening, Endurance training, Range of motion, Therapeutic exercise, Therapeutic activity, Home exercise program  PT Plan: Skilled PT  PT Frequency: 5 times per week  PT Discharge Recommendations: High intensity level of continued care  Equipment Recommended upon Discharge:  (none)  PT Recommended Transfer Status: Assist x1  PT - OK to Discharge: Yes       General Visit Information:   PT  Visit  PT Received On: 11/08/23  Response to Previous Treatment: Patient with no complaints from previous session.  General  Family/Caregiver Present: Yes  Caregiver Feedback: wife present for support  Prior to Session Communication: PCT/NA/CTA  Patient Position Received: Up in chair, Alarm on  Preferred Learning Style: verbal, visual  General Comment: The pt cooperative and willing to participate in therapy.    Subjective   Precautions:  Precautions  Medical Precautions: Fall precautions  Post-Surgical Precautions: Sternal precautions       Objective   Pain:  Pain  Assessment  Pain Assessment: 0-10  Pain Score: 0 - No pain  Response to Interventions: pain was not a barrier to therapy  Cognition:  Cognition  Overall Cognitive Status: Impaired  Arousal/Alertness: Inconsistent responses to stimuli  Following Commands: Follows one step commands with increased time  Memory: Exceptions to [facility]  Long-Term Memory: Impaired  Short-Term Memory: Impaired  Problem Solving: Exceptions to [facility]  Complex Functional Tasks: Impaired  Managing Finances: Impaired  Simple Functional Tasks: Impaired  Verbal Reasoning Skills: Impaired  Processing Speed: Delayed  Postural Control:     Extremity/Trunk Assessments:  RUE   RUE : Exceptions to [facility]  RUE AROM (degrees)  RUE AROM Comment: [facility]  RUE Strength  RUE Overall Strength: Deficits  R Shoulder Flexion: 4-/5  R Elbow Flexion: 4-/5  R Elbow Extension: 4-/5  R Wrist Flexion: 4/5  R Wrist Extension: 4/5  LUE   LUE: Exceptions to [facility]  LUE AROM (degrees)  LUE AROM Comment: [facility]  LUE Strength  LUE Overall Strength: Greater than or equal to 3/5 as evidenced by functional mobility  L Shoulder Flexion: 4+/5  L Elbow Flexion: 4+/5  L Elbow Extension: 4+/5  L Wrist Flexion: 4+/5  L Wrist Extension: 4+/5  RLE   RLE : Exceptions to [facility]  AROM RLE (degrees)  RLE AROM Comment: WFL  Strength RLE  RLE Overall Strength: Deficits  R Hip Flexion: 3+/5  R Knee Flexion: 4-/5  R Knee Extension: 4-/5  R Ankle Dorsiflexion: 4/5  R Ankle Plantar Flexion: 4/5  LLE   LLE : Within Functional Limits  AROM LLE (degrees)  LLE AROM Comment: decreased knee flexion  Strength LLE  LLE Overall Strength: Within Functional Limits - strength 5/5  L Hip Flexion: 4+/5  L Knee Flexion: 4+/5  L Knee Extension: 4+/5  L Ankle Dorsiflexion: 4+/5  L Ankle Plantar Flexion: 4+/5  Activity Tolerance:  Activity Tolerance  Endurance: Decreased tolerance for upright activites  Treatments:  Balance/Neuromuscular Re-Education  Balance/Neuromuscular Re-Education Activity Performed: Yes  Balance/Neuromuscular  Re-Education Activity 1: minimal assist static standing balance using a wheeled walker.    Bed Mobility  Bed Mobility: Yes  Bed Mobility 1  Bed Mobility 1: Supine to sitting  Level of Assistance 1: Minimum assistance  Bed Mobility Comments 1: HOB elevated    Ambulation/Gait Training  Ambulation/Gait Training Performed: Yes  Ambulation/Gait Training 1  Surface 1: Level tile  Device 1: Rolling walker  Assistance 1: Moderate assistance  Quality of Gait 1:  (unsteady, decreased endurance)  Comments/Distance (ft) 1: 15ft  Transfers  Transfer: Yes  Transfer 1  Transfer From 1: Sit to  Transfer to 1: Stand  Transfer Device 1: Walker  Transfer Level of Assistance 1: Minimum assistance  Transfers 2  Transfer From 2: Stand to  Transfer to 2: Sit  Transfer Device 2: Walker  Transfer Level of Assistance 2: Minimum assistance  Transfers 3  Transfer From 3: Bed to  Transfer to 3: Chair with arms  Transfer Device 3: Walker  Transfer Level of Assistance 3: Moderate assistance    Outcome Measures:  [facility] Basic Mobility  Turning from your back to your side while in a flat bed without using bedrails: A little  Moving from lying on your back to sitting on the side of a flat bed without using bedrails: A little  Moving to and from bed to chair (including a wheelchair): A little  Standing up from a chair using your arms (e.g. wheelchair or bedside chair): A little  To walk in hospital room: A lot  Climbing 3-5 steps with railing: Total  Basic Mobility - Total Score: 15    Education Documentation  Mobility Training, taught by Patrice Alves PT at 11/8/2023  5:04 PM.  Learner: Significant Other, Patient  Readiness: Eager  Method: Explanation, Demonstration  Response: Verbalizes Understanding    Education Comments  No comments found.        OP EDUCATION:  Education  Individual(s) Educated: Patient, Spouse  Education Provided: Body Mechanics, Fall Risk, Post-Op Precautions, Home Exercise Program  Patient Response to Education:  Patient/Caregiver Verbalized Understanding of Information    Encounter Problems       Encounter Problems (Active)       Balance       Pt will score >24 on Tinetti for low risk of falls. (Progressing)       Start:  10/31/23    Expected End:  11/14/23               Mobility       Patient will ambulate >75 ft with LRAD and supervision for improved functional mobility (Progressing)       Start:  10/31/23    Expected End:  11/14/23               Pain - Adult          Transfers       Patient to transfer to and from sit to supine independently (Progressing)       Start:  10/31/23    Expected End:  11/14/23            Patient will transfer sit to and from stand with LRAD and supervision for improved functional mobility (Progressing)       Start:  10/31/23    Expected End:  11/14/23

## 2023-11-09 VITALS
DIASTOLIC BLOOD PRESSURE: 54 MMHG | SYSTOLIC BLOOD PRESSURE: 125 MMHG | WEIGHT: 162.04 LBS | HEART RATE: 72 BPM | RESPIRATION RATE: 16 BRPM | TEMPERATURE: 99 F | OXYGEN SATURATION: 93 % | HEIGHT: 67 IN | BODY MASS INDEX: 25.43 KG/M2

## 2023-11-09 PROBLEM — K59.00 CONSTIPATION: Status: ACTIVE | Noted: 2023-11-09

## 2023-11-09 PROBLEM — R33.8 ACUTE URINARY RETENTION: Status: ACTIVE | Noted: 2023-11-09

## 2023-11-09 PROBLEM — R33.8 ACUTE URINARY RETENTION: Status: RESOLVED | Noted: 2023-11-09 | Resolved: 2023-11-09

## 2023-11-09 PROBLEM — I10 PRIMARY HYPERTENSION: Chronic | Status: ACTIVE | Noted: 2023-11-09

## 2023-11-09 PROBLEM — I35.0 SEVERE AORTIC STENOSIS: Status: RESOLVED | Noted: 2023-10-30 | Resolved: 2023-11-09

## 2023-11-09 PROBLEM — I48.20 CHRONIC ATRIAL FIBRILLATION (MULTI): Chronic | Status: ACTIVE | Noted: 2023-11-09

## 2023-11-09 PROBLEM — I63.20 CEREBROVASCULAR ACCIDENT (CVA) DUE TO OCCLUSION OF PRECEREBRAL ARTERY (MULTI): Status: ACTIVE | Noted: 2023-11-09

## 2023-11-09 PROBLEM — K59.00 CONSTIPATION: Status: RESOLVED | Noted: 2023-11-09 | Resolved: 2023-11-09

## 2023-11-09 PROBLEM — M25.562 ARTHRALGIA OF BOTH KNEES: Chronic | Status: ACTIVE | Noted: 2023-11-09

## 2023-11-09 PROBLEM — M25.561 ARTHRALGIA OF BOTH KNEES: Chronic | Status: ACTIVE | Noted: 2023-11-09

## 2023-11-09 PROBLEM — I35.0 NONRHEUMATIC AORTIC VALVE STENOSIS: Status: RESOLVED | Noted: 2023-10-20 | Resolved: 2023-11-09

## 2023-11-09 LAB
BASOPHILS # BLD AUTO: 0.08 X10*3/UL (ref 0–0.1)
BASOPHILS NFR BLD AUTO: 0.6 %
EOSINOPHIL # BLD AUTO: 0.54 X10*3/UL (ref 0–0.4)
EOSINOPHIL NFR BLD AUTO: 4.3 %
ERYTHROCYTE [DISTWIDTH] IN BLOOD BY AUTOMATED COUNT: 14.1 % (ref 11.5–14.5)
HCT VFR BLD AUTO: 28.7 % (ref 41–52)
HGB BLD-MCNC: 8.8 G/DL (ref 13.5–17.5)
IMM GRANULOCYTES # BLD AUTO: 0.16 X10*3/UL (ref 0–0.5)
IMM GRANULOCYTES NFR BLD AUTO: 1.3 % (ref 0–0.9)
LYMPHOCYTES # BLD AUTO: 0.53 X10*3/UL (ref 0.8–3)
LYMPHOCYTES NFR BLD AUTO: 4.3 %
MCH RBC QN AUTO: 30.3 PG (ref 26–34)
MCHC RBC AUTO-ENTMCNC: 30.7 G/DL (ref 32–36)
MCV RBC AUTO: 99 FL (ref 80–100)
MONOCYTES # BLD AUTO: 1.1 X10*3/UL (ref 0.05–0.8)
MONOCYTES NFR BLD AUTO: 8.8 %
NEUTROPHILS # BLD AUTO: 10.06 X10*3/UL (ref 1.6–5.5)
NEUTROPHILS NFR BLD AUTO: 80.7 %
NRBC BLD-RTO: 0 /100 WBCS (ref 0–0)
PLATELET # BLD AUTO: 225 X10*3/UL (ref 150–450)
RBC # BLD AUTO: 2.9 X10*6/UL (ref 4.5–5.9)
WBC # BLD AUTO: 12.5 X10*3/UL (ref 4.4–11.3)

## 2023-11-09 PROCEDURE — 85025 COMPLETE CBC W/AUTO DIFF WBC: CPT

## 2023-11-09 PROCEDURE — 36415 COLL VENOUS BLD VENIPUNCTURE: CPT

## 2023-11-09 PROCEDURE — 2500000004 HC RX 250 GENERAL PHARMACY W/ HCPCS (ALT 636 FOR OP/ED): Performed by: NURSE PRACTITIONER

## 2023-11-09 PROCEDURE — 2500000001 HC RX 250 WO HCPCS SELF ADMINISTERED DRUGS (ALT 637 FOR MEDICARE OP)

## 2023-11-09 PROCEDURE — 2500000002 HC RX 250 W HCPCS SELF ADMINISTERED DRUGS (ALT 637 FOR MEDICARE OP, ALT 636 FOR OP/ED)

## 2023-11-09 PROCEDURE — 2500000004 HC RX 250 GENERAL PHARMACY W/ HCPCS (ALT 636 FOR OP/ED)

## 2023-11-09 PROCEDURE — 99239 HOSP IP/OBS DSCHRG MGMT >30: CPT | Performed by: INTERNAL MEDICINE

## 2023-11-09 PROCEDURE — 96372 THER/PROPH/DIAG INJ SC/IM: CPT

## 2023-11-09 RX ORDER — FUROSEMIDE 20 MG/1
20 TABLET ORAL DAILY
Start: 2023-11-10

## 2023-11-09 RX ORDER — LISINOPRIL 5 MG/1
5 TABLET ORAL DAILY
Start: 2023-11-10

## 2023-11-09 RX ORDER — WARFARIN 2 MG/1
2 TABLET ORAL SEE ADMIN INSTRUCTIONS
Start: 2023-11-09

## 2023-11-09 RX ORDER — LISINOPRIL 5 MG/1
5 TABLET ORAL DAILY
Status: DISCONTINUED | OUTPATIENT
Start: 2023-11-09 | End: 2023-11-09 | Stop reason: HOSPADM

## 2023-11-09 RX ORDER — POLYETHYLENE GLYCOL 3350 17 G/17G
17 POWDER, FOR SOLUTION ORAL DAILY
Start: 2023-11-10

## 2023-11-09 RX ORDER — AMOXICILLIN 250 MG
2 CAPSULE ORAL 2 TIMES DAILY PRN
Start: 2023-11-09

## 2023-11-09 RX ORDER — TAMSULOSIN HYDROCHLORIDE 0.4 MG/1
0.4 CAPSULE ORAL DAILY
Start: 2023-11-10

## 2023-11-09 RX ORDER — PANTOPRAZOLE SODIUM 40 MG/1
40 TABLET, DELAYED RELEASE ORAL
Start: 2023-11-10

## 2023-11-09 RX ORDER — DICLOFENAC SODIUM 10 MG/G
4 GEL TOPICAL 4 TIMES DAILY PRN
Start: 2023-11-09

## 2023-11-09 RX ORDER — POTASSIUM CHLORIDE 20 MEQ/1
40 TABLET, EXTENDED RELEASE ORAL ONCE
Status: COMPLETED | OUTPATIENT
Start: 2023-11-09 | End: 2023-11-09

## 2023-11-09 RX ORDER — ACETAMINOPHEN 325 MG/1
650 TABLET ORAL EVERY 6 HOURS PRN
Qty: 30 TABLET | Refills: 0
Start: 2023-11-09

## 2023-11-09 RX ORDER — ASPIRIN 81 MG/1
81 TABLET ORAL DAILY
Start: 2023-11-10 | End: 2023-11-13

## 2023-11-09 RX ADMIN — FUROSEMIDE 20 MG: 20 TABLET ORAL at 08:48

## 2023-11-09 RX ADMIN — CLOPIDOGREL BISULFATE 75 MG: 75 TABLET ORAL at 08:49

## 2023-11-09 RX ADMIN — SOTALOL HYDROCHLORIDE 80 MG: 80 TABLET ORAL at 11:29

## 2023-11-09 RX ADMIN — TAMSULOSIN HYDROCHLORIDE 0.4 MG: 0.4 CAPSULE ORAL at 08:48

## 2023-11-09 RX ADMIN — HEPARIN SODIUM 5000 UNITS: 5000 INJECTION INTRAVENOUS; SUBCUTANEOUS at 00:54

## 2023-11-09 RX ADMIN — EZETIMIBE 10 MG: 10 TABLET ORAL at 11:29

## 2023-11-09 RX ADMIN — HEPARIN SODIUM 5000 UNITS: 5000 INJECTION INTRAVENOUS; SUBCUTANEOUS at 08:49

## 2023-11-09 RX ADMIN — ASPIRIN 81 MG: 81 TABLET, COATED ORAL at 08:48

## 2023-11-09 RX ADMIN — LISINOPRIL 5 MG: 5 TABLET ORAL at 11:29

## 2023-11-09 RX ADMIN — POTASSIUM CHLORIDE 40 MEQ: 1500 TABLET, EXTENDED RELEASE ORAL at 08:48

## 2023-11-09 RX ADMIN — PANTOPRAZOLE SODIUM 40 MG: 40 TABLET, DELAYED RELEASE ORAL at 06:15

## 2023-11-09 ASSESSMENT — PAIN SCALES - GENERAL: PAINLEVEL_OUTOF10: 0 - NO PAIN

## 2023-11-09 NOTE — PROGRESS NOTES
Physical Therapy                 Therapy Communication Note    Patient Name: Javier Tate  MRN: [id number]  Today's Date: 11/9/2023     Discipline: Physical Therapy    Missed Visit Reason: Missed Visit Reason: Patient refused    Missed Time: Attempt    Comment: The pt was tired and wanting to rest and is scheduled for discharge to rehab facility today.

## 2023-11-09 NOTE — DISCHARGE SUMMARY
Discharge Diagnosis  Nonrheumatic aortic valve stenosis    Issues Requiring Follow-Up  Blood Cultures in process  Urine Culture in progress    Test Results Pending At Discharge  Pending Labs       Order Current Status    CBC Collected (11/08/23 1811)    Blood Culture Preliminary result    Blood Culture Preliminary result    Extra Tubes Preliminary result    Urine Tube Preliminary result            Hospital Course  Javier Tate is an 88 year old male with hx of aortic stenosis who presented for scheduled TAVR on 10/30. Post procedure complicated by disorientation and aphasia. 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 Patient followed by neuro stroke while here with plans for dapt and vascular neurology follow up in 3 months. Patients warfarin being held I/s/o infarct. Stable for floor transfer.      Floor course:  POD 1 ECHO showed EF 60-65%, negative bubble study, triv AI, grad 12.1/6.0, mod MR (was mod/severe), mod TR, elevated RVSP, dilated IVC, and triv PC effusion. Continue to assess LAAO and PADMAJA candidacy as outpatient.    After discussions with neurology and structural heart, consensus decision on DAPT: with fresh PCI in July, it's recommended to continue DAPT until warfarin can be started 11/13 (2 weeks post stroke), then cont with Plavix + Warfarin & discontinue ASA 81.     On 11/6, RN reported acute urinary retention and hematuria. Flomax was initiation and minaya catheter was placed, draining 500cc. Urology was consulted which recommended a repeat trial of void at midnight on the day of discharge, if the patient voids without elevated PVR (>250cc or symptomatic) can be discharged without a catheter. Given that the patient just had catheter placement and is on Plavix with plans to transition to Warfarin, it is expected that there may be increasing waxing/waning hematuria. Patient with successful trial of void. Minaya catheter removed 11/8 and patient continued on Flomax. Hgb  remained stable, 8.8 at time of discharge.     Transient leukocytosis with intermittent fevers, infectious work up negative. Lactate WNL, initial BC x2 and UA x2 negative. Worsening leukocytosis on 11/8, WBC 19.1, improved to 12.5 on day of discharge. Pt afebrile, denied chills, groin sites clean, no s/s of infection. Repeat BC x2 from 11/8 and UA/UC collected NGTD, pending final results.     Iron studies consistent with ANTONIO, given IV iron while inpatient, continued on PO at discharge.    Issues with constipation, resolved prior to discharge s/p suppository.     Arthritic pain treated with topical gel, tylenol, lidocaine patches, and hot/cold packs.    Dr. Ortiz requested EP input on atrial fib med regimen: per Dr. Landry, EP Fellow, patient  continued on sotalol and digoxin discontinued. Referral made to follow up with EP at .     Patient was recommended for acute rehab after PT/OT eval. Patient/family agreeable.    Discharge weight: 73.5 kg    After all labs and VS were reviewed the decision was made that the patient was medically stable for discharge.  The patient was discharged in satisfactory condition.    More than 30 minutes were spent in coordinating patient discharge.      Pertinent Physical Exam At Time of Discharge  Physical Exam  Vitals and nursing note reviewed.   Constitutional:       General: He is not in acute distress.     Appearance: He is ill-appearing. He is not diaphoretic.   HENT:      Head: Atraumatic.      Nose: Nose normal.      Mouth/Throat:      Mouth: Mucous membranes are moist.      Pharynx: Oropharynx is clear.   Eyes:      Extraocular Movements: Extraocular movements intact.      Conjunctiva/sclera: Conjunctivae normal.   Neck:      Comments: No JVD  Cardiovascular:      Rate and Rhythm: Normal rate. Rhythm irregular.      Heart sounds: Normal heart sounds.   Pulmonary:      Effort: Pulmonary effort is normal. No respiratory distress.      Breath sounds: Normal breath sounds. No  stridor. No wheezing or rhonchi.   Abdominal:      General: Abdomen is flat. Bowel sounds are normal. There is no distension.      Palpations: Abdomen is soft.      Tenderness: There is no abdominal tenderness. There is no guarding.   Musculoskeletal:      Cervical back: Normal range of motion.      Comments: Decreased ROM RUE, improving   Skin:     General: Skin is warm and dry.      Comments: Bilateral groin sited CHRISTIANO, no oozing, bleeding, or hematoma   Neurological:      Mental Status: He is alert. He is disoriented.      Sensory: Sensory deficit: x2-3, waxes and wanes.      Motor: Weakness (Rt>Lt) present.      Gait: Gait abnormal.      Comments: Receptive and expressive aphasia   Psychiatric:         Mood and Affect: Mood normal.         Behavior: Behavior normal.     Home Medications     Medication List      START taking these medications     acetaminophen 325 mg tablet; Commonly known as: Tylenol; Take 2 tablets   (650 mg) by mouth every 6 hours if needed for mild pain (1 - 3).   aspirin 81 mg EC tablet; Take 1 tablet (81 mg) by mouth once daily for 3   days. STOP taking when restarted on Coumadin, on 11/13 Do not start before   November 10, 2023.; Start taking on: November 10, 2023   diclofenac sodium 1 % gel gel; Commonly known as: Voltaren; Apply 1   Application topically 4 times a day as needed (Joint Pain).   furosemide 20 mg tablet; Commonly known as: Lasix; Take 1 tablet (20 mg)   by mouth once daily. Do not start before November 10, 2023.; Start taking   on: November 10, 2023   pantoprazole 40 mg EC tablet; Commonly known as: ProtoNix; Take 1 tablet   (40 mg) by mouth once daily in the morning. Take before meals. Do not   crush, chew, or split. Do not start before November 10, 2023.; Start   taking on: November 10, 2023   polyethylene glycol 17 gram packet; Commonly known as: Glycolax,   Miralax; Take 17 g by mouth once daily. Do not start before November 10,   2023.; Start taking on: November 10,  2023   sennosides-docusate sodium 8.6-50 mg tablet; Commonly known as:   Linda-Colace; Take 2 tablets by mouth 2 times a day as needed for   constipation.   tamsulosin 0.4 mg 24 hr capsule; Commonly known as: Flomax; Take 1   capsule (0.4 mg) by mouth once daily. Do not start before November 10,   2023.; Start taking on: November 10, 2023     CHANGE how you take these medications     lisinopril 5 mg tablet; Take 1 tablet (5 mg) by mouth once daily. Do not   start before November 10, 2023.; Start taking on: November 10, 2023; What   changed: medication strength, how much to take, when to take this   warfarin 2 mg tablet; Commonly known as: Coumadin; Take 1 tablet (2 mg)   by mouth see administration instructions. Take 1 and 1/2 tablets (3 mg)   once daily on Sunday, Monday, Wednesday, Friday and Saturday. Take 1   tablet (2 mg) once daily on Tuesday and Thursday. DO NOT RESUME until   11/13.; What changed: additional instructions     CONTINUE taking these medications     Claritin 10 mg tablet; Generic drug: loratadine   clopidogrel 75 mg tablet; Commonly known as: Plavix   coenzyme Q-10 200 mg capsule   ezetimibe 10 mg tablet; Commonly known as: Zetia   ferrous sulfate 325 (65 Fe) MG tablet   ICAPS AREDS2 ORAL   melatonin 5 mg tablet   multivit-mineral-iron-lutein tablet   omega-3 300-1,000 mg capsule; Commonly known as: Fish Oil   Pepcid 20 mg tablet; Generic drug: famotidine   sotalol 80 mg tablet; Commonly known as: Betapace   Vabysmo 6 mg/0.05 mL solution injection; Generic drug: faricimab-svoa     STOP taking these medications     digoxin 125 MCG tablet; Commonly known as: Lanoxin       Outpatient Follow-Up  Future Appointments   Date Time Provider Department [place]   12/1/2023  1:30 PM  CHEPE [id number] CARD1 [id number] [facility] [place]   12/18/2023  1:30 PM MD BHAVIN Esteves   10/31/2024 10:00 AM  CHEPE [id number] CARD1 [id number] [facility]       Rosa Isela Munson, APRN-CNP

## 2023-11-09 NOTE — NURSING NOTE
Discharge instructions reviewed with patient and spouse. Ivs removed. Telemetry removed. Patient awaiting transport with community care.    Jennifer Liu RN

## 2023-11-10 ENCOUNTER — HOSPITAL ENCOUNTER (OUTPATIENT)
Dept: CARDIOLOGY | Facility: HOSPITAL | Age: 88
Discharge: HOME | End: 2023-11-10
Payer: MEDICARE

## 2023-11-10 LAB
ATRIAL RATE: 70 BPM
PR INTERVAL: 186 MS
Q ONSET: 194 MS
QRS COUNT: 12 BEATS
QRS DURATION: 160 MS
QT INTERVAL: 456 MS
QTC CALCULATION(BAZETT): 502 MS
QTC FREDERICIA: 487 MS
R AXIS: -71 DEGREES
T AXIS: 91 DEGREES
T OFFSET: 422 MS
VENTRICULAR RATE: 73 BPM

## 2023-11-10 PROCEDURE — 93005 ELECTROCARDIOGRAM TRACING: CPT

## 2023-11-12 LAB
BACTERIA BLD CULT: NORMAL
BACTERIA BLD CULT: NORMAL

## 2023-12-01 ENCOUNTER — APPOINTMENT (OUTPATIENT)
Dept: CARDIOLOGY | Facility: HOSPITAL | Age: 88
End: 2023-12-01
Payer: MEDICARE

## 2023-12-13 LAB — HOLD SPECIMEN: NORMAL

## 2023-12-18 ENCOUNTER — APPOINTMENT (OUTPATIENT)
Dept: UROLOGY | Facility: HOSPITAL | Age: 88
End: 2023-12-18
Payer: MEDICARE

## 2024-10-31 ENCOUNTER — APPOINTMENT (OUTPATIENT)
Dept: CARDIOLOGY | Facility: CLINIC | Age: 89
End: 2024-10-31

## (undated) DEVICE — Device

## (undated) DEVICE — GUIDEWIRE, STIFF SHAFT, ANGLE TIP, .035 DIA, 180 CM,  3 CM TIP"

## (undated) DEVICE — DEVICE, CLOSURE, PERCLOSE, PROSTYLE

## (undated) DEVICE — CATHETER, ANGIO, IMPULSE, PIG 155, 6 FR X 110 CM

## (undated) DEVICE — GUIDE WIRE, 035/190CM, HI-TORGUE SUPRA CORE

## (undated) DEVICE — CATHETER, ANGIO, IMPULSE, AL1, 6 FR X 100 CM

## (undated) DEVICE — SLEEVE, REPOSITIONING, W/C-LOCK ADAPTER, 60 CM

## (undated) DEVICE — CABLE, PACING PATIENT BIPOLAR 8'

## (undated) DEVICE — GUIDEWIRE, LUNDERQUIST, EXTRA STIFF, CURVED, .035 X 260

## (undated) DEVICE — SHEATH, PINNACLE, 10 CM,  8FR INTRODUCER, 8FR DIA, 2.5 CM DIALATOR

## (undated) DEVICE — INTRODUCER, SHEATH, FAST-CATH, 7 FR X 23 CM

## (undated) DEVICE — CATHETER, ANGIO, IMPULSE, PIGTAIL, 6 FR X 110 CM

## (undated) DEVICE — GUIDEWIRE, LUNDERQUIST, EXTRA STIFF, DBL CURVE, .035 X 260

## (undated) DEVICE — CATHETER, PACING, PACEL, FLOW DIRECTED, 5 FR X 110 CM